# Patient Record
Sex: FEMALE | Race: WHITE | Employment: UNEMPLOYED | URBAN - METROPOLITAN AREA
[De-identification: names, ages, dates, MRNs, and addresses within clinical notes are randomized per-mention and may not be internally consistent; named-entity substitution may affect disease eponyms.]

---

## 2017-01-20 RX ORDER — SIMVASTATIN 20 MG
TABLET ORAL
Qty: 30 TABLET | Refills: 0 | Status: SHIPPED | OUTPATIENT
Start: 2017-01-20 | End: 2017-02-16

## 2017-01-20 RX ORDER — ALPRAZOLAM 0.5 MG/1
TABLET ORAL
Qty: 40 TABLET | Refills: 0 | Status: SHIPPED | OUTPATIENT
Start: 2017-01-20 | End: 2017-04-17

## 2017-02-16 RX ORDER — ALPRAZOLAM 0.5 MG/1
0.5 TABLET ORAL 3 TIMES DAILY PRN
Qty: 40 TABLET | Refills: 0 | OUTPATIENT
Start: 2017-02-16

## 2017-02-16 RX ORDER — SIMVASTATIN 20 MG
TABLET ORAL
Qty: 15 TABLET | Refills: 0 | Status: SHIPPED | OUTPATIENT
Start: 2017-02-16 | End: 2017-03-16

## 2017-02-16 RX ORDER — PAROXETINE HYDROCHLORIDE 40 MG/1
TABLET, FILM COATED ORAL
Qty: 15 TABLET | Refills: 0 | Status: SHIPPED | OUTPATIENT
Start: 2017-02-16 | End: 2017-02-27

## 2017-02-16 RX ORDER — LOSARTAN POTASSIUM AND HYDROCHLOROTHIAZIDE 25; 100 MG/1; MG/1
1 TABLET ORAL
Qty: 15 TABLET | Refills: 0 | Status: SHIPPED | OUTPATIENT
Start: 2017-02-16 | End: 2017-03-16

## 2017-02-17 RX ORDER — PAROXETINE HYDROCHLORIDE 40 MG/1
TABLET, FILM COATED ORAL
Qty: 30 TABLET | Refills: 0 | OUTPATIENT
Start: 2017-02-17

## 2017-02-22 ENCOUNTER — TELEPHONE (OUTPATIENT)
Dept: INTERNAL MEDICINE CLINIC | Facility: CLINIC | Age: 52
End: 2017-02-22

## 2017-02-22 NOTE — TELEPHONE ENCOUNTER
LVM informing pt of missed appt and No Show policy.  Also asked pt to call our office to reschedule appt

## 2017-02-27 RX ORDER — PAROXETINE HYDROCHLORIDE 40 MG/1
40 TABLET, FILM COATED ORAL EVERY MORNING
Qty: 15 TABLET | Refills: 0 | Status: SHIPPED | OUTPATIENT
Start: 2017-02-27 | End: 2017-03-16

## 2017-02-27 NOTE — TELEPHONE ENCOUNTER
05/06/16-last ov, pt needs ov for med f/u, looks like she cancelled last one, I left her a message to call back and schedule

## 2017-03-06 ENCOUNTER — TELEPHONE (OUTPATIENT)
Dept: INTERNAL MEDICINE CLINIC | Facility: CLINIC | Age: 52
End: 2017-03-06

## 2017-03-06 NOTE — TELEPHONE ENCOUNTER
Called patient regarding missed/no show appointment. Left voice message regarding second no show fee (potentially) plus option to reschedule.

## 2017-03-10 RX ORDER — PAROXETINE HYDROCHLORIDE 40 MG/1
40 TABLET, FILM COATED ORAL EVERY MORNING
Qty: 15 TABLET | Refills: 0 | OUTPATIENT
Start: 2017-03-10

## 2017-03-15 ENCOUNTER — TELEPHONE (OUTPATIENT)
Dept: INTERNAL MEDICINE CLINIC | Facility: CLINIC | Age: 52
End: 2017-03-15

## 2017-03-16 ENCOUNTER — OFFICE VISIT (OUTPATIENT)
Dept: RHEUMATOLOGY | Facility: CLINIC | Age: 52
End: 2017-03-16

## 2017-03-16 VITALS
SYSTOLIC BLOOD PRESSURE: 120 MMHG | BODY MASS INDEX: 34.99 KG/M2 | DIASTOLIC BLOOD PRESSURE: 90 MMHG | HEART RATE: 88 BPM | RESPIRATION RATE: 18 BRPM | WEIGHT: 210 LBS | HEIGHT: 65 IN

## 2017-03-16 DIAGNOSIS — M16.0 PRIMARY OSTEOARTHRITIS OF HIPS, BILATERAL: Primary | ICD-10-CM

## 2017-03-16 DIAGNOSIS — M17.32 POST-TRAUMATIC OSTEOARTHRITIS OF LEFT KNEE: ICD-10-CM

## 2017-03-16 PROCEDURE — 99213 OFFICE O/P EST LOW 20 MIN: CPT | Performed by: INTERNAL MEDICINE

## 2017-03-16 RX ORDER — SIMVASTATIN 20 MG
TABLET ORAL
Qty: 30 TABLET | Refills: 0 | Status: SHIPPED | OUTPATIENT
Start: 2017-03-16 | End: 2017-04-17 | Stop reason: ALTCHOICE

## 2017-03-16 RX ORDER — PAROXETINE HYDROCHLORIDE 40 MG/1
40 TABLET, FILM COATED ORAL EVERY MORNING
Qty: 30 TABLET | Refills: 0 | Status: SHIPPED | OUTPATIENT
Start: 2017-03-16 | End: 2017-04-17

## 2017-03-16 RX ORDER — HYDROCODONE BITARTRATE AND ACETAMINOPHEN 10; 325 MG/1; MG/1
1 TABLET ORAL EVERY 4 HOURS PRN
Qty: 150 TABLET | Refills: 0 | Status: SHIPPED | OUTPATIENT
Start: 2017-03-16 | End: 2017-03-16

## 2017-03-16 RX ORDER — MELOXICAM 15 MG/1
15 TABLET ORAL DAILY
Qty: 30 TABLET | Refills: 2 | Status: SHIPPED | OUTPATIENT
Start: 2017-03-16 | End: 2017-04-17

## 2017-03-16 RX ORDER — LOSARTAN POTASSIUM AND HYDROCHLOROTHIAZIDE 25; 100 MG/1; MG/1
1 TABLET ORAL
Qty: 30 TABLET | Refills: 0 | Status: SHIPPED | OUTPATIENT
Start: 2017-03-16 | End: 2017-04-17

## 2017-03-16 RX ORDER — HYDROCODONE BITARTRATE AND ACETAMINOPHEN 10; 325 MG/1; MG/1
1 TABLET ORAL EVERY 4 HOURS PRN
Qty: 150 TABLET | Refills: 0 | Status: SHIPPED | OUTPATIENT
Start: 2017-04-16 | End: 2017-03-16

## 2017-03-16 RX ORDER — HYDROCODONE BITARTRATE AND ACETAMINOPHEN 10; 325 MG/1; MG/1
1 TABLET ORAL EVERY 4 HOURS PRN
Qty: 150 TABLET | Refills: 0 | Status: SHIPPED | OUTPATIENT
Start: 2017-05-16 | End: 2017-09-18

## 2017-03-16 NOTE — PROGRESS NOTES
[facility]  Dr. Son Co Progress Note     Subjective:   Marie Alvarado is a(n) 46year old female. Current complaints: Patient presents with:  Osteoarthritis: 2 month f/u. Pt has not gone to orthopedic dr-does not have insurance.   Continues with hi

## 2017-03-16 NOTE — PATIENT INSTRUCTIONS
Reports that Celebrex is not helping much and she is self-pay now for medicine. Therefore discontinue Celebrex and will try a less expensive anti-inflammatory. Plan is to try meloxicam 15 mg once a day.   For breakthrough pain take Norco 1 tablet every 4-

## 2017-04-17 ENCOUNTER — OFFICE VISIT (OUTPATIENT)
Dept: INTERNAL MEDICINE CLINIC | Facility: CLINIC | Age: 52
End: 2017-04-17

## 2017-04-17 VITALS
HEART RATE: 130 BPM | SYSTOLIC BLOOD PRESSURE: 122 MMHG | TEMPERATURE: 99 F | BODY MASS INDEX: 35 KG/M2 | DIASTOLIC BLOOD PRESSURE: 80 MMHG | RESPIRATION RATE: 16 BRPM | WEIGHT: 207.5 LBS | OXYGEN SATURATION: 97 %

## 2017-04-17 DIAGNOSIS — R19.7 VOMITING AND DIARRHEA: ICD-10-CM

## 2017-04-17 DIAGNOSIS — R39.9 URINARY TRACT INFECTION SYMPTOMS: ICD-10-CM

## 2017-04-17 DIAGNOSIS — R11.10 VOMITING AND DIARRHEA: ICD-10-CM

## 2017-04-17 DIAGNOSIS — I10 ESSENTIAL HYPERTENSION, BENIGN: ICD-10-CM

## 2017-04-17 DIAGNOSIS — E11.9 DIABETES MELLITUS WITHOUT COMPLICATION (HCC): Primary | ICD-10-CM

## 2017-04-17 DIAGNOSIS — F41.1 ANXIETY STATE: ICD-10-CM

## 2017-04-17 DIAGNOSIS — E78.00 PURE HYPERCHOLESTEROLEMIA: ICD-10-CM

## 2017-04-17 PROCEDURE — 81003 URINALYSIS AUTO W/O SCOPE: CPT | Performed by: FAMILY MEDICINE

## 2017-04-17 PROCEDURE — 99213 OFFICE O/P EST LOW 20 MIN: CPT | Performed by: FAMILY MEDICINE

## 2017-04-17 RX ORDER — PAROXETINE HYDROCHLORIDE 40 MG/1
40 TABLET, FILM COATED ORAL EVERY MORNING
Qty: 30 TABLET | Refills: 2 | Status: SHIPPED | OUTPATIENT
Start: 2017-04-17 | End: 2017-07-18

## 2017-04-17 RX ORDER — ALPRAZOLAM 0.5 MG/1
0.5 TABLET ORAL 3 TIMES DAILY PRN
Qty: 40 TABLET | Refills: 2 | Status: SHIPPED | OUTPATIENT
Start: 2017-04-17 | End: 2017-07-17

## 2017-04-17 RX ORDER — ONDANSETRON 4 MG/1
4 TABLET, FILM COATED ORAL EVERY 8 HOURS PRN
Qty: 30 TABLET | Refills: 0 | Status: SHIPPED | OUTPATIENT
Start: 2017-04-17 | End: 2018-01-30

## 2017-04-17 RX ORDER — ATORVASTATIN CALCIUM 20 MG/1
20 TABLET, FILM COATED ORAL NIGHTLY
Qty: 30 TABLET | Refills: 2 | Status: SHIPPED | OUTPATIENT
Start: 2017-04-17 | End: 2017-07-18

## 2017-04-17 RX ORDER — LOSARTAN POTASSIUM AND HYDROCHLOROTHIAZIDE 25; 100 MG/1; MG/1
1 TABLET ORAL
Qty: 30 TABLET | Refills: 2 | Status: SHIPPED | OUTPATIENT
Start: 2017-04-17 | End: 2017-07-18

## 2017-04-17 NOTE — PROGRESS NOTES
HPI:   Geronimo Peñaloza is a 46year old female who presents for recheck of her diabetes. Patient’s FBS have been 's   Last visit with ophthalmologist was 2015. Pt has no insurance to do it. .    Pt has been checking her feet on a regular basis.  Daniel kern bismal and that has helped some. But still having some issues with nausea.         Wt Readings from Last 6 Encounters:  04/17/17 : 207 lb 8 oz  03/16/17 : 210 lb  10/27/16 : 188 lb  09/16/16 : 188 lb  06/27/16 : 205 lb  06/06/16 : 204 lb    Body mass index 30 tablet Rfl: 2   Ondansetron HCl (ZOFRAN) 4 mg tablet Take 1 tablet (4 mg total) by mouth every 8 (eight) hours as needed for Nausea.  Disp: 30 tablet Rfl: 0   [START ON 5/16/2017] HYDROcodone-acetaminophen (NORCO)  MG Oral Tab Take 1 tablet by mout no W/R/R.  CARDIO: RRR without murmur  GI: BS x4, normoactive, no masses or HSM.   Soft without distention ,+ suprapubic pressure  Bilateral barefoot skin diabetic exam is normal, visualized feet and the appearance is normal.  Bilateral monofilament/sensati diet    (F41.1) Anxiety state  Plan: PARoxetine HCl 40 MG Oral Tab, ALPRAZolam 0.5         MG Oral Tab            (I10) Essential hypertension, benign  Plan: Losartan Potassium-HCTZ 100-25 MG Oral Tab        Avoid salt and caffeine    (R11.10,  R19.7) Vomi

## 2017-06-20 ENCOUNTER — OFFICE VISIT (OUTPATIENT)
Dept: RHEUMATOLOGY | Facility: CLINIC | Age: 52
End: 2017-06-20

## 2017-06-20 VITALS
WEIGHT: 205 LBS | SYSTOLIC BLOOD PRESSURE: 110 MMHG | HEART RATE: 100 BPM | RESPIRATION RATE: 16 BRPM | BODY MASS INDEX: 34.16 KG/M2 | DIASTOLIC BLOOD PRESSURE: 78 MMHG | HEIGHT: 65 IN

## 2017-06-20 DIAGNOSIS — M17.32 POST-TRAUMATIC OSTEOARTHRITIS OF LEFT KNEE: Primary | ICD-10-CM

## 2017-06-20 DIAGNOSIS — M16.0 PRIMARY OSTEOARTHRITIS OF HIPS, BILATERAL: ICD-10-CM

## 2017-06-20 PROCEDURE — 99212 OFFICE O/P EST SF 10 MIN: CPT | Performed by: INTERNAL MEDICINE

## 2017-06-20 RX ORDER — HYDROCODONE BITARTRATE AND ACETAMINOPHEN 10; 325 MG/1; MG/1
1 TABLET ORAL
Qty: 150 TABLET | Refills: 0 | Status: SHIPPED | OUTPATIENT
Start: 2017-06-20 | End: 2017-07-20

## 2017-06-20 RX ORDER — HYDROCODONE BITARTRATE AND ACETAMINOPHEN 10; 325 MG/1; MG/1
1 TABLET ORAL
Qty: 150 TABLET | Refills: 0 | Status: SHIPPED | OUTPATIENT
Start: 2017-07-20 | End: 2017-08-19

## 2017-06-20 RX ORDER — ETODOLAC 500 MG/1
500 TABLET, FILM COATED ORAL 2 TIMES DAILY
Qty: 60 TABLET | Refills: 2 | Status: SHIPPED | OUTPATIENT
Start: 2017-06-20 | End: 2017-09-18 | Stop reason: ALTCHOICE

## 2017-06-20 RX ORDER — HYDROCODONE BITARTRATE AND ACETAMINOPHEN 10; 325 MG/1; MG/1
1 TABLET ORAL
Qty: 150 TABLET | Refills: 0 | Status: SHIPPED | OUTPATIENT
Start: 2017-08-20 | End: 2017-09-18

## 2017-06-20 NOTE — PATIENT INSTRUCTIONS
Current plan is to switch to E etodolac 500 mg twice a day for anti-inflammatory arthritis pain relief. Celebrex has been discontinued it did not work. Meloxicam also discontinued it was not helpful.   Over-the-counter medicines like Aleve and ibuprofen a

## 2017-06-20 NOTE — PROGRESS NOTES
[facility]  Dr. Banerjee Exon Progress Note     Subjective:   Gen Guerrero is a(n) 46year old female. Current complaints: Patient presents with:  Osteoarthritis: 3 month f/u. Pt states continues bilateral hips/knee/hand pain.  New symptom-right shoulde

## 2017-07-10 DIAGNOSIS — I10 ESSENTIAL HYPERTENSION, BENIGN: ICD-10-CM

## 2017-07-10 DIAGNOSIS — E78.00 PURE HYPERCHOLESTEROLEMIA: ICD-10-CM

## 2017-07-10 DIAGNOSIS — F41.1 ANXIETY STATE: ICD-10-CM

## 2017-07-10 RX ORDER — PAROXETINE HYDROCHLORIDE 40 MG/1
40 TABLET, FILM COATED ORAL EVERY MORNING
Qty: 30 TABLET | Refills: 0 | OUTPATIENT
Start: 2017-07-10

## 2017-07-10 RX ORDER — LOSARTAN POTASSIUM AND HYDROCHLOROTHIAZIDE 25; 100 MG/1; MG/1
1 TABLET ORAL
Qty: 30 TABLET | Refills: 0 | OUTPATIENT
Start: 2017-07-10

## 2017-07-10 RX ORDER — ATORVASTATIN CALCIUM 20 MG/1
20 TABLET, FILM COATED ORAL NIGHTLY
Qty: 30 TABLET | Refills: 0 | OUTPATIENT
Start: 2017-07-10

## 2017-07-17 DIAGNOSIS — F41.1 ANXIETY STATE: ICD-10-CM

## 2017-07-17 RX ORDER — ALPRAZOLAM 0.5 MG/1
0.5 TABLET ORAL 3 TIMES DAILY PRN
Qty: 40 TABLET | Refills: 0 | Status: SHIPPED | OUTPATIENT
Start: 2017-07-17 | End: 2017-10-19

## 2017-07-17 NOTE — TELEPHONE ENCOUNTER
Faxed Rx script ([facility].5mg) [facility] (DD #[phone]).

## 2017-07-18 DIAGNOSIS — E11.9 DIABETES MELLITUS WITHOUT COMPLICATION (HCC): ICD-10-CM

## 2017-07-18 DIAGNOSIS — I10 ESSENTIAL HYPERTENSION, BENIGN: ICD-10-CM

## 2017-07-18 DIAGNOSIS — E78.00 PURE HYPERCHOLESTEROLEMIA: ICD-10-CM

## 2017-07-18 DIAGNOSIS — F41.1 ANXIETY STATE: ICD-10-CM

## 2017-07-18 RX ORDER — PAROXETINE HYDROCHLORIDE 40 MG/1
40 TABLET, FILM COATED ORAL EVERY MORNING
Qty: 30 TABLET | Refills: 0 | Status: SHIPPED | OUTPATIENT
Start: 2017-07-18 | End: 2017-09-19

## 2017-07-18 RX ORDER — LOSARTAN POTASSIUM AND HYDROCHLOROTHIAZIDE 25; 100 MG/1; MG/1
1 TABLET ORAL
Qty: 30 TABLET | Refills: 0 | Status: SHIPPED | OUTPATIENT
Start: 2017-07-18 | End: 2017-09-19

## 2017-07-18 RX ORDER — ATORVASTATIN CALCIUM 20 MG/1
20 TABLET, FILM COATED ORAL NIGHTLY
Qty: 30 TABLET | Refills: 0 | Status: SHIPPED | OUTPATIENT
Start: 2017-07-18 | End: 2017-10-19

## 2017-07-18 NOTE — TELEPHONE ENCOUNTER
It has been a year since she has had her labs done. I will give one month of her medications and then Pt needs to get her labs done. Pt needs a Hgba1c, Lipid and CMP. Pt can call around to the different labs to see who can do it for less.   [facility] in [place]

## 2017-07-18 NOTE — TELEPHONE ENCOUNTER
Refills on metformin, atorvastatin, losartan-hctz and paroxetine were denied since she is over due for lab work. Last lab draw 6/2016. Pt stated that she is unemployed and can not afford lab work at this time, but needs refills on medications.      me

## 2017-09-18 ENCOUNTER — TELEPHONE (OUTPATIENT)
Dept: INTERNAL MEDICINE CLINIC | Facility: CLINIC | Age: 52
End: 2017-09-18

## 2017-09-18 ENCOUNTER — OFFICE VISIT (OUTPATIENT)
Dept: RHEUMATOLOGY | Facility: CLINIC | Age: 52
End: 2017-09-18

## 2017-09-18 VITALS
BODY MASS INDEX: 36 KG/M2 | SYSTOLIC BLOOD PRESSURE: 142 MMHG | DIASTOLIC BLOOD PRESSURE: 86 MMHG | HEART RATE: 68 BPM | RESPIRATION RATE: 20 BRPM | WEIGHT: 217 LBS

## 2017-09-18 DIAGNOSIS — M17.32 POST-TRAUMATIC OSTEOARTHRITIS OF LEFT KNEE: ICD-10-CM

## 2017-09-18 DIAGNOSIS — I10 ESSENTIAL HYPERTENSION, BENIGN: ICD-10-CM

## 2017-09-18 DIAGNOSIS — M16.0 PRIMARY OSTEOARTHRITIS OF HIPS, BILATERAL: Primary | ICD-10-CM

## 2017-09-18 DIAGNOSIS — F41.1 ANXIETY STATE: ICD-10-CM

## 2017-09-18 PROCEDURE — 99213 OFFICE O/P EST LOW 20 MIN: CPT | Performed by: INTERNAL MEDICINE

## 2017-09-18 RX ORDER — HYDROCODONE BITARTRATE AND ACETAMINOPHEN 10; 325 MG/1; MG/1
1 TABLET ORAL EVERY 4 HOURS PRN
Qty: 150 TABLET | Refills: 0 | Status: SHIPPED | OUTPATIENT
Start: 2017-09-18 | End: 2017-09-18

## 2017-09-18 RX ORDER — HYDROCODONE BITARTRATE AND ACETAMINOPHEN 10; 325 MG/1; MG/1
1 TABLET ORAL EVERY 4 HOURS PRN
Qty: 150 TABLET | Refills: 0 | Status: SHIPPED | OUTPATIENT
Start: 2017-10-18 | End: 2018-03-19

## 2017-09-18 RX ORDER — DICLOFENAC SODIUM 75 MG/1
75 TABLET, DELAYED RELEASE ORAL 2 TIMES DAILY
Qty: 60 TABLET | Refills: 3 | Status: SHIPPED | OUTPATIENT
Start: 2017-09-18 | End: 2018-03-19 | Stop reason: ALTCHOICE

## 2017-09-18 RX ORDER — HYDROCODONE BITARTRATE AND ACETAMINOPHEN 10; 325 MG/1; MG/1
1 TABLET ORAL EVERY 4 HOURS PRN
Qty: 150 TABLET | Refills: 0 | Status: SHIPPED | OUTPATIENT
Start: 2017-11-17 | End: 2018-03-19

## 2017-09-18 RX ORDER — HYDROCODONE BITARTRATE AND ACETAMINOPHEN 10; 325 MG/1; MG/1
1 TABLET ORAL EVERY 4 HOURS PRN
Qty: 150 TABLET | Refills: 0 | Status: SHIPPED | OUTPATIENT
Start: 2017-09-18 | End: 2017-10-18

## 2017-09-18 NOTE — TELEPHONE ENCOUNTER
Patient requesting a refill on her Losartan-Potassium HCTZ 100-25 and Paroxetine HCTZ through [facility] in [place] on [address]. She is aware she is due for a blood test but is umemployed and can not afford. She is all out of medication.

## 2017-09-18 NOTE — PATIENT INSTRUCTIONS
Current plan is to discontinue E etodolac for lack of effectiveness. Also previously Celebrex and meloxicam did not work. These are nonsteroidal anti-inflammatory drugs.   We will try a different nonsteroidal anti-inflammatory drug called diclofenac 75 mg

## 2017-09-18 NOTE — TELEPHONE ENCOUNTER
Ok to refill one month only per Dr. Selvin Peralta. Pt needs an appt next month and she needs her labs done or no more refills. Pt has not had any labs done in over a year.  Pt can go to [facility] in [place] [address] and get all three labs that she needs for about 75$.

## 2017-09-19 RX ORDER — LOSARTAN POTASSIUM AND HYDROCHLOROTHIAZIDE 25; 100 MG/1; MG/1
1 TABLET ORAL
Qty: 30 TABLET | Refills: 0 | Status: SHIPPED | OUTPATIENT
Start: 2017-09-19 | End: 2017-10-19

## 2017-09-19 RX ORDER — PAROXETINE HYDROCHLORIDE 40 MG/1
40 TABLET, FILM COATED ORAL EVERY MORNING
Qty: 30 TABLET | Refills: 0 | Status: SHIPPED | OUTPATIENT
Start: 2017-09-19 | End: 2017-10-19

## 2017-09-19 NOTE — TELEPHONE ENCOUNTER
Patient called in requesting refill for the medications once again ( please see first message). Patient stated she is unable to schedule appointment due to no insurance/insurance complications.

## 2017-09-19 NOTE — TELEPHONE ENCOUNTER
Pt informed states aware of lab has the information but still can not afford ,advised pt only 30 day sent till appt/labs

## 2017-10-18 ENCOUNTER — TELEPHONE (OUTPATIENT)
Dept: INTERNAL MEDICINE CLINIC | Facility: CLINIC | Age: 52
End: 2017-10-18

## 2017-10-18 DIAGNOSIS — E11.9 DIABETES MELLITUS WITHOUT COMPLICATION (HCC): ICD-10-CM

## 2017-10-18 DIAGNOSIS — F41.1 ANXIETY STATE: ICD-10-CM

## 2017-10-18 DIAGNOSIS — E78.00 PURE HYPERCHOLESTEROLEMIA: ICD-10-CM

## 2017-10-18 DIAGNOSIS — I10 ESSENTIAL HYPERTENSION, BENIGN: ICD-10-CM

## 2017-10-18 NOTE — TELEPHONE ENCOUNTER
Patient calling to request refills for ALPRAZolam 0.5 MG,atorvastatin (LIPITOR) 20 Mg, MetFORMIN HCl 1000 Mg, PARoxetine HCl 40 Mg, and  Losartan Potassium-HCTZ 100-25 Mg.  Please call patient back

## 2017-10-19 RX ORDER — ALPRAZOLAM 0.5 MG/1
0.5 TABLET ORAL 3 TIMES DAILY PRN
Qty: 40 TABLET | Refills: 0 | Status: SHIPPED | OUTPATIENT
Start: 2017-10-19 | End: 2018-01-18

## 2017-10-19 RX ORDER — LOSARTAN POTASSIUM AND HYDROCHLOROTHIAZIDE 25; 100 MG/1; MG/1
1 TABLET ORAL
Qty: 90 TABLET | Refills: 0 | Status: SHIPPED | OUTPATIENT
Start: 2017-10-19 | End: 2018-01-18

## 2017-10-19 RX ORDER — GLIMEPIRIDE 2 MG/1
2 TABLET ORAL
Qty: 90 TABLET | Refills: 0 | Status: SHIPPED | OUTPATIENT
Start: 2017-10-19 | End: 2018-07-06

## 2017-10-19 RX ORDER — ATORVASTATIN CALCIUM 20 MG/1
20 TABLET, FILM COATED ORAL NIGHTLY
Qty: 90 TABLET | Refills: 0 | Status: SHIPPED | OUTPATIENT
Start: 2017-10-19 | End: 2018-01-18

## 2017-10-19 RX ORDER — PAROXETINE HYDROCHLORIDE 40 MG/1
40 TABLET, FILM COATED ORAL EVERY MORNING
Qty: 90 TABLET | Refills: 0 | Status: SHIPPED | OUTPATIENT
Start: 2017-10-19 | End: 2018-01-18

## 2017-10-19 NOTE — TELEPHONE ENCOUNTER
Labs reviewed. Chol,trigs,LDL bad,not under control. Has Pt been taking her Lipitor? If yes she needs to increase it to 40 mg daily #30 with 2 refills. Hgba1c worse than before 7.6. Pt to continue Metformin but add Amaryl 2 mg one daily #30 with 2 refills.

## 2017-10-19 NOTE — TELEPHONE ENCOUNTER
Pt stated that she is unemployed and is having a difficult time affording an office visit, although she did schedule f/u visit for 10/31/17. She also stated that she had labs drawn 10/16/17 through [facility] requested labs to be faxed to our office.  Selene

## 2017-10-19 NOTE — TELEPHONE ENCOUNTER
Patient informed of lab result and orders. Patient has not been taking Lipitor for months or metformin. Would you give a refill on all medications for 90 days?  Patient will repeat the labs and will schedule office visit in 3 months, please advise

## 2017-10-31 ENCOUNTER — TELEPHONE (OUTPATIENT)
Dept: INTERNAL MEDICINE CLINIC | Facility: CLINIC | Age: 52
End: 2017-10-31

## 2017-10-31 NOTE — TELEPHONE ENCOUNTER
Called patient regarding today's missed appointment. Left message explaining our [facility] cancellation policy and stated that this will be considered a NO SHOW.  Being that this is the second no show within the year, patient was notified that there will be a

## 2017-11-01 NOTE — TELEPHONE ENCOUNTER
Per Oz, since pt is not working at this time, she will waive the $50 No Show fee charge. However, if this was to occur again, the third no show patient will be charged the fee.    Called patient to relay message, no answer, left voice message and for the

## 2017-12-19 ENCOUNTER — OFFICE VISIT (OUTPATIENT)
Dept: RHEUMATOLOGY | Facility: CLINIC | Age: 52
End: 2017-12-19

## 2017-12-19 VITALS — HEART RATE: 78 BPM | DIASTOLIC BLOOD PRESSURE: 78 MMHG | RESPIRATION RATE: 18 BRPM | SYSTOLIC BLOOD PRESSURE: 122 MMHG

## 2017-12-19 DIAGNOSIS — E66.9 CLASS 2 OBESITY WITHOUT SERIOUS COMORBIDITY IN ADULT, UNSPECIFIED BMI, UNSPECIFIED OBESITY TYPE: ICD-10-CM

## 2017-12-19 DIAGNOSIS — M16.0 PRIMARY OSTEOARTHRITIS OF HIPS, BILATERAL: Primary | ICD-10-CM

## 2017-12-19 DIAGNOSIS — M17.32 POST-TRAUMATIC OSTEOARTHRITIS OF LEFT KNEE: ICD-10-CM

## 2017-12-19 PROCEDURE — 99213 OFFICE O/P EST LOW 20 MIN: CPT | Performed by: INTERNAL MEDICINE

## 2017-12-19 RX ORDER — HYDROCODONE BITARTRATE AND ACETAMINOPHEN 10; 325 MG/1; MG/1
1 TABLET ORAL EVERY 4 HOURS PRN
Qty: 150 TABLET | Refills: 0 | Status: SHIPPED | OUTPATIENT
Start: 2017-12-19 | End: 2018-01-18

## 2017-12-19 RX ORDER — HYDROCODONE BITARTRATE AND ACETAMINOPHEN 10; 325 MG/1; MG/1
1 TABLET ORAL EVERY 4 HOURS PRN
Qty: 150 TABLET | Refills: 0 | Status: SHIPPED | OUTPATIENT
Start: 2018-01-18 | End: 2018-02-17

## 2017-12-19 RX ORDER — HYDROCODONE BITARTRATE AND ACETAMINOPHEN 10; 325 MG/1; MG/1
1 TABLET ORAL EVERY 4 HOURS PRN
Qty: 150 TABLET | Refills: 0 | Status: SHIPPED | OUTPATIENT
Start: 2018-02-17 | End: 2018-03-19

## 2017-12-19 NOTE — PROGRESS NOTES
[facility]  Dr. Radha Rizvi Progress Note     Subjective:   Veronica Kingston is a(n) 45year old female. Current complaints: Patient presents with:  Fibromyalgia Syndrome: 3 month f/u. No major concerns since last visit. Currently has the flu.   Arthriti

## 2017-12-19 NOTE — PATIENT INSTRUCTIONS
Continue for arthritis treatment diclofenac 75 mg twice a day. For arthriits pain also use NOrco 10 mg one every 4-6 hours, up to 5 per day. Try to use less if possible. Exercise as best you can. Low fat diet. Return to see doctor in 3 months.

## 2018-01-04 ENCOUNTER — TELEPHONE (OUTPATIENT)
Dept: INTERNAL MEDICINE CLINIC | Facility: CLINIC | Age: 53
End: 2018-01-04

## 2018-01-10 ENCOUNTER — TELEPHONE (OUTPATIENT)
Dept: INTERNAL MEDICINE CLINIC | Facility: CLINIC | Age: 53
End: 2018-01-10

## 2018-01-18 ENCOUNTER — OFFICE VISIT (OUTPATIENT)
Dept: INTERNAL MEDICINE CLINIC | Facility: CLINIC | Age: 53
End: 2018-01-18

## 2018-01-18 ENCOUNTER — TELEPHONE (OUTPATIENT)
Dept: INTERNAL MEDICINE CLINIC | Facility: CLINIC | Age: 53
End: 2018-01-18

## 2018-01-18 VITALS
BODY MASS INDEX: 35 KG/M2 | TEMPERATURE: 98 F | OXYGEN SATURATION: 99 % | DIASTOLIC BLOOD PRESSURE: 88 MMHG | SYSTOLIC BLOOD PRESSURE: 130 MMHG | RESPIRATION RATE: 16 BRPM | HEART RATE: 101 BPM | WEIGHT: 208 LBS

## 2018-01-18 DIAGNOSIS — F41.1 ANXIETY STATE: ICD-10-CM

## 2018-01-18 DIAGNOSIS — I10 ESSENTIAL HYPERTENSION, BENIGN: ICD-10-CM

## 2018-01-18 DIAGNOSIS — E78.00 PURE HYPERCHOLESTEROLEMIA: ICD-10-CM

## 2018-01-18 DIAGNOSIS — E11.9 DIABETES MELLITUS WITHOUT COMPLICATION (HCC): Primary | ICD-10-CM

## 2018-01-18 PROCEDURE — 99213 OFFICE O/P EST LOW 20 MIN: CPT | Performed by: FAMILY MEDICINE

## 2018-01-18 RX ORDER — ALPRAZOLAM 0.5 MG/1
0.5 TABLET ORAL 3 TIMES DAILY PRN
Qty: 40 TABLET | Refills: 0 | Status: SHIPPED | OUTPATIENT
Start: 2018-01-18 | End: 2018-02-15

## 2018-01-18 RX ORDER — LOSARTAN POTASSIUM AND HYDROCHLOROTHIAZIDE 25; 100 MG/1; MG/1
1 TABLET ORAL
Qty: 90 TABLET | Refills: 0 | Status: SHIPPED | OUTPATIENT
Start: 2018-01-18 | End: 2018-04-04

## 2018-01-18 RX ORDER — ATORVASTATIN CALCIUM 20 MG/1
20 TABLET, FILM COATED ORAL NIGHTLY
Qty: 90 TABLET | Refills: 0 | Status: SHIPPED | OUTPATIENT
Start: 2018-01-18 | End: 2018-04-04

## 2018-01-18 RX ORDER — PAROXETINE HYDROCHLORIDE 40 MG/1
40 TABLET, FILM COATED ORAL EVERY MORNING
Qty: 90 TABLET | Refills: 0 | Status: SHIPPED | OUTPATIENT
Start: 2018-01-18 | End: 2018-07-06

## 2018-01-18 RX ORDER — GLIMEPIRIDE 4 MG/1
4 TABLET ORAL
Qty: 90 TABLET | Refills: 0 | Status: SHIPPED | OUTPATIENT
Start: 2018-01-18 | End: 2018-04-04

## 2018-01-18 NOTE — PROGRESS NOTES
HPI:   Carl Mcardle is a 45year old female who presents for recheck of her diabetes. Patient’s FBS have been in the 120-150's. Pt checks her sugar on and off with her husbands meter because she can not afford her own. Pt has no insurance.   Last visit Value   03/05/2013 45 (L)   12/28/2011 48   05/26/2011 37 (L)   ----------  HDL CHOL (mg/dL)   Date Value   09/19/2013 43 (L)   ----------  LDL-CHOLESTEROL (mg/dL (calc))   Date Value   03/05/2013 61   12/28/2011 80   05/26/2011 61   ----------  LDL CHOLES than 5 per day Disp: 150 tablet Rfl: 0   Diclofenac Sodium 75 MG Oral Tab EC Take 1 tablet (75 mg total) by mouth 2 (two) times daily.  Disp: 60 tablet Rfl: 3   HYDROcodone-acetaminophen (NORCO)  MG Oral Tab Take 1 tablet by mouth every 4 (four) hours suspicious lesions  HEENT: NC/AT, PERRLA, EOMs intact b/l. Ears clear b/l, throat without erythema or exudate. NECK: supple,no adenopathy,no bruits. No thyromegaly.    LUNGS: clear to auscultation, no W/R/R.  CARDIO: RRR without murmur  GI: BS x4, normoa times daily as needed. Dispense: 40 tablet; Refill: 0  - PARoxetine HCl 40 MG Oral Tab; Take 1 tablet (40 mg total) by mouth every morning. Dispense: 90 tablet; Refill: 0    2.  Pure hypercholesterolemia  Pt to continue her medication, increase exercise,

## 2018-01-30 DIAGNOSIS — R19.7 VOMITING AND DIARRHEA: ICD-10-CM

## 2018-01-30 DIAGNOSIS — R11.10 VOMITING AND DIARRHEA: ICD-10-CM

## 2018-01-30 RX ORDER — ONDANSETRON 4 MG/1
TABLET, FILM COATED ORAL
Qty: 30 TABLET | Refills: 1 | Status: SHIPPED | OUTPATIENT
Start: 2018-01-30 | End: 2019-12-11 | Stop reason: ALTCHOICE

## 2018-02-08 DIAGNOSIS — F41.1 ANXIETY STATE: ICD-10-CM

## 2018-02-09 RX ORDER — ALPRAZOLAM 0.5 MG/1
TABLET ORAL
Qty: 40 TABLET | Refills: 0 | OUTPATIENT
Start: 2018-02-09

## 2018-02-15 DIAGNOSIS — F41.1 ANXIETY STATE: ICD-10-CM

## 2018-02-15 RX ORDER — ALPRAZOLAM 0.5 MG/1
TABLET ORAL
Qty: 40 TABLET | Refills: 0 | Status: SHIPPED | OUTPATIENT
Start: 2018-02-15 | End: 2018-05-02

## 2018-03-19 ENCOUNTER — OFFICE VISIT (OUTPATIENT)
Dept: RHEUMATOLOGY | Facility: CLINIC | Age: 53
End: 2018-03-19

## 2018-03-19 VITALS
DIASTOLIC BLOOD PRESSURE: 86 MMHG | WEIGHT: 208 LBS | HEART RATE: 80 BPM | RESPIRATION RATE: 20 BRPM | SYSTOLIC BLOOD PRESSURE: 132 MMHG | BODY MASS INDEX: 35 KG/M2

## 2018-03-19 DIAGNOSIS — M17.32 POST-TRAUMATIC OSTEOARTHRITIS OF LEFT KNEE: ICD-10-CM

## 2018-03-19 DIAGNOSIS — M16.0 PRIMARY OSTEOARTHRITIS OF HIPS, BILATERAL: Primary | ICD-10-CM

## 2018-03-19 PROCEDURE — 99212 OFFICE O/P EST SF 10 MIN: CPT | Performed by: INTERNAL MEDICINE

## 2018-03-19 RX ORDER — INDOMETHACIN 75 MG/1
75 CAPSULE, EXTENDED RELEASE ORAL 2 TIMES DAILY
Qty: 60 CAPSULE | Refills: 5 | Status: SHIPPED | OUTPATIENT
Start: 2018-03-19 | End: 2018-06-18 | Stop reason: ALTCHOICE

## 2018-03-19 RX ORDER — HYDROCODONE BITARTRATE AND ACETAMINOPHEN 10; 325 MG/1; MG/1
1 TABLET ORAL EVERY 4 HOURS PRN
Qty: 150 TABLET | Refills: 0 | Status: SHIPPED | OUTPATIENT
Start: 2018-05-19 | End: 2018-12-14

## 2018-03-19 RX ORDER — HYDROCODONE BITARTRATE AND ACETAMINOPHEN 10; 325 MG/1; MG/1
1 TABLET ORAL EVERY 4 HOURS PRN
Qty: 150 TABLET | Refills: 0 | Status: SHIPPED | OUTPATIENT
Start: 2018-04-19 | End: 2018-05-19

## 2018-03-19 RX ORDER — DICLOFENAC SODIUM 75 MG/1
75 TABLET, DELAYED RELEASE ORAL 2 TIMES DAILY
Qty: 60 TABLET | Refills: 3 | Status: CANCELLED | OUTPATIENT
Start: 2018-03-19

## 2018-03-19 RX ORDER — HYDROCODONE BITARTRATE AND ACETAMINOPHEN 10; 325 MG/1; MG/1
1 TABLET ORAL EVERY 4 HOURS PRN
Qty: 150 TABLET | Refills: 0 | Status: SHIPPED | OUTPATIENT
Start: 2018-03-19 | End: 2018-12-14

## 2018-03-19 NOTE — PROGRESS NOTES
[facility]  Dr. Floyd Velasquez Progress Note     Subjective:   Carl Mcardle is a(n) 45year old female. Current complaints: Patient presents with:  Osteoarthritis: 3 month f/u. Pt states 'is having more pain than usual- all over body pain.  Is having lot

## 2018-03-19 NOTE — PATIENT INSTRUCTIONS
Current plan at this time is to discontinue diclofenac due to lack of effectiveness. In its place try indomethacin sustained release 75 mg twice a day which is a generic anti-inflammatory. Take one with breakfast one with supper.   If it does not help you

## 2018-04-04 DIAGNOSIS — E78.00 PURE HYPERCHOLESTEROLEMIA: ICD-10-CM

## 2018-04-04 DIAGNOSIS — E11.9 DIABETES MELLITUS WITHOUT COMPLICATION (HCC): ICD-10-CM

## 2018-04-04 DIAGNOSIS — I10 ESSENTIAL HYPERTENSION, BENIGN: ICD-10-CM

## 2018-04-04 RX ORDER — LOSARTAN POTASSIUM AND HYDROCHLOROTHIAZIDE 25; 100 MG/1; MG/1
TABLET ORAL
Qty: 90 TABLET | Refills: 0 | Status: SHIPPED | OUTPATIENT
Start: 2018-04-04 | End: 2018-07-06

## 2018-04-04 RX ORDER — ATORVASTATIN CALCIUM 20 MG/1
TABLET, FILM COATED ORAL
Qty: 90 TABLET | Refills: 0 | Status: SHIPPED | OUTPATIENT
Start: 2018-04-04 | End: 2018-07-06

## 2018-04-04 RX ORDER — GLIMEPIRIDE 4 MG/1
TABLET ORAL
Qty: 90 TABLET | Refills: 0 | Status: SHIPPED | OUTPATIENT
Start: 2018-04-04 | End: 2018-07-06

## 2018-04-04 NOTE — TELEPHONE ENCOUNTER
Refill requested: Glimepiride + Metformin + Losartan + Atorvastatin     Failed protocol - Due to Lipid Panel (it is scanned in media 1/17/18)      Last refill: 1/18/18 Atorvastatin 20 #90 NR + Metformin 1000 #180 NR + Losartan 100-25 #90 NR + Glimepiride 4 mg #90 NR     Relevant Labs: 1/17/18 (scanned in Media)    Last OV / RTC advised: 1/18/18 RTC in 6 months  Appt Scheduled: No  Your appointments     Date & Time Appointment Department [facility])    Jun 18, 2018 11:00 AM CDT Exam - Established Patient with MD [facility] (Extension Kisha Pulido)        [address] [place] [address],[address], [place] [address] [phone]

## 2018-05-02 DIAGNOSIS — F41.1 ANXIETY STATE: ICD-10-CM

## 2018-05-03 RX ORDER — ALPRAZOLAM 0.5 MG/1
TABLET ORAL
Qty: 40 TABLET | Refills: 0 | Status: SHIPPED | OUTPATIENT
Start: 2018-05-03 | End: 2018-05-31

## 2018-05-08 DIAGNOSIS — E11.9 DIABETES MELLITUS WITHOUT COMPLICATION (HCC): ICD-10-CM

## 2018-05-08 DIAGNOSIS — E78.00 PURE HYPERCHOLESTEROLEMIA: ICD-10-CM

## 2018-05-08 DIAGNOSIS — I10 ESSENTIAL HYPERTENSION, BENIGN: ICD-10-CM

## 2018-05-08 DIAGNOSIS — F41.1 ANXIETY STATE: ICD-10-CM

## 2018-05-09 RX ORDER — PAROXETINE HYDROCHLORIDE 40 MG/1
TABLET, FILM COATED ORAL
Qty: 30 TABLET | Refills: 1 | Status: SHIPPED | OUTPATIENT
Start: 2018-05-09 | End: 2018-07-06

## 2018-05-09 RX ORDER — ATORVASTATIN CALCIUM 20 MG/1
TABLET, FILM COATED ORAL
Qty: 30 TABLET | Refills: 1 | OUTPATIENT
Start: 2018-05-09

## 2018-05-09 RX ORDER — GLIMEPIRIDE 4 MG/1
TABLET ORAL
Qty: 90 TABLET | Refills: 0 | OUTPATIENT
Start: 2018-05-09

## 2018-05-09 RX ORDER — LOSARTAN POTASSIUM AND HYDROCHLOROTHIAZIDE 25; 100 MG/1; MG/1
TABLET ORAL
Qty: 90 TABLET | Refills: 0 | OUTPATIENT
Start: 2018-05-09

## 2018-05-31 DIAGNOSIS — F41.1 ANXIETY STATE: ICD-10-CM

## 2018-06-01 NOTE — TELEPHONE ENCOUNTER
ALPRAZOLAM 0.5 MG Oral Tab 40 tablet 0 5/3/2018    Sig :  TAKE 1 TABLET BY MOUTH THREE TIMES A DAY AS NEEDED

## 2018-06-02 RX ORDER — ALPRAZOLAM 0.5 MG/1
TABLET ORAL
Qty: 40 TABLET | Refills: 0 | Status: SHIPPED | OUTPATIENT
Start: 2018-06-02 | End: 2018-07-06

## 2018-06-18 ENCOUNTER — OFFICE VISIT (OUTPATIENT)
Dept: RHEUMATOLOGY | Facility: CLINIC | Age: 53
End: 2018-06-18

## 2018-06-18 VITALS
SYSTOLIC BLOOD PRESSURE: 122 MMHG | HEART RATE: 68 BPM | WEIGHT: 207 LBS | RESPIRATION RATE: 20 BRPM | BODY MASS INDEX: 34 KG/M2 | DIASTOLIC BLOOD PRESSURE: 84 MMHG

## 2018-06-18 DIAGNOSIS — M16.0 PRIMARY OSTEOARTHRITIS OF HIPS, BILATERAL: Primary | ICD-10-CM

## 2018-06-18 DIAGNOSIS — M17.0 PRIMARY OSTEOARTHRITIS OF BOTH KNEES: ICD-10-CM

## 2018-06-18 PROCEDURE — 99213 OFFICE O/P EST LOW 20 MIN: CPT | Performed by: INTERNAL MEDICINE

## 2018-06-18 RX ORDER — HYDROCODONE BITARTRATE AND ACETAMINOPHEN 10; 325 MG/1; MG/1
1 TABLET ORAL EVERY 4 HOURS PRN
Qty: 150 TABLET | Refills: 0 | Status: SHIPPED | OUTPATIENT
Start: 2018-06-18 | End: 2018-07-18

## 2018-06-18 RX ORDER — HYDROCODONE BITARTRATE AND ACETAMINOPHEN 10; 325 MG/1; MG/1
1 TABLET ORAL EVERY 4 HOURS PRN
Qty: 150 TABLET | Refills: 0 | Status: SHIPPED | OUTPATIENT
Start: 2018-08-17 | End: 2018-09-16

## 2018-06-18 RX ORDER — HYDROCODONE BITARTRATE AND ACETAMINOPHEN 10; 325 MG/1; MG/1
1 TABLET ORAL EVERY 4 HOURS PRN
Qty: 150 TABLET | Refills: 0 | Status: SHIPPED | OUTPATIENT
Start: 2018-07-18 | End: 2018-08-17

## 2018-06-18 RX ORDER — CELECOXIB 200 MG/1
200 CAPSULE ORAL 2 TIMES DAILY PRN
Qty: 60 CAPSULE | Refills: 2 | Status: SHIPPED | OUTPATIENT
Start: 2018-06-18 | End: 2018-07-16

## 2018-06-18 NOTE — PATIENT INSTRUCTIONS
Use Celebrex 200 mg once or twice a day for the arthritis pain. Use Norco 10 mg one every 4-6 hours up to 5 per day. Use a heating pad as needed. Use BenGay and other salves as needed. Eventually see Dr Estevez Headings of Orthopedics.   Return to office 3 mon

## 2018-06-18 NOTE — PROGRESS NOTES
[facility]  Dr. Daniel Hogue Progress Note     Subjective:   Atilio Wade is a(n) 45year old female. Current complaints: Patient presents with:  Osteoarthritis: 3 month f/u. Pt c/o bilateral hip/knee pain. Pt states 'pain has just been getting worse.

## 2018-06-28 DIAGNOSIS — F41.1 ANXIETY STATE: ICD-10-CM

## 2018-06-29 RX ORDER — ALPRAZOLAM 0.5 MG/1
TABLET ORAL
Qty: 40 TABLET | Refills: 0 | OUTPATIENT
Start: 2018-06-29

## 2018-07-02 DIAGNOSIS — F41.1 ANXIETY STATE: ICD-10-CM

## 2018-07-03 DIAGNOSIS — E78.00 PURE HYPERCHOLESTEROLEMIA: ICD-10-CM

## 2018-07-03 DIAGNOSIS — E11.9 DIABETES MELLITUS WITHOUT COMPLICATION (HCC): ICD-10-CM

## 2018-07-03 DIAGNOSIS — I10 ESSENTIAL HYPERTENSION, BENIGN: ICD-10-CM

## 2018-07-03 DIAGNOSIS — F41.1 ANXIETY STATE: ICD-10-CM

## 2018-07-05 RX ORDER — LOSARTAN POTASSIUM AND HYDROCHLOROTHIAZIDE 25; 100 MG/1; MG/1
1 TABLET ORAL DAILY
Qty: 90 TABLET | Refills: 1 | OUTPATIENT
Start: 2018-07-05

## 2018-07-05 RX ORDER — PAROXETINE HYDROCHLORIDE 40 MG/1
40 TABLET, FILM COATED ORAL EVERY MORNING
Qty: 90 TABLET | Refills: 1 | OUTPATIENT
Start: 2018-07-05

## 2018-07-05 RX ORDER — GLIMEPIRIDE 4 MG/1
4 TABLET ORAL
Qty: 90 TABLET | Refills: 1 | OUTPATIENT
Start: 2018-07-05

## 2018-07-05 RX ORDER — ATORVASTATIN CALCIUM 20 MG/1
20 TABLET, FILM COATED ORAL NIGHTLY
Qty: 90 TABLET | Refills: 1 | OUTPATIENT
Start: 2018-07-05

## 2018-07-05 RX ORDER — ALPRAZOLAM 0.5 MG/1
TABLET ORAL
Qty: 40 TABLET | Refills: 0 | OUTPATIENT
Start: 2018-07-05

## 2018-07-05 NOTE — TELEPHONE ENCOUNTER
Refill requested: Losartan Potassium HCTZ 100-25 mg + Atorvastatin 20 mg + Glimepiride 4 mg + Metformin 1000 mg + Paroxetine 40 mg     Failed protocol      Last refill: 4/4/18 Losartan HCTZ 100-25 mg #90 NR + Glimepiride 4 mg #90 NR + Metformin 1000 mg #18

## 2018-07-05 NOTE — TELEPHONE ENCOUNTER
** Spoke to pt and scheduled appt for 7/16**    Refill requested: Alprazolam .5 mg    Failed protocol      Last refill: 6/2/18 #40 NR    Relevant Labs: NA  Last OV / RTC advised: 1/18/18 Refugkeegan Denney RTC in 6 months , sooner prn    Appt Scheduled: NO  Your

## 2018-07-06 RX ORDER — LOSARTAN POTASSIUM AND HYDROCHLOROTHIAZIDE 25; 100 MG/1; MG/1
TABLET ORAL
Qty: 10 TABLET | Refills: 0 | Status: SHIPPED | OUTPATIENT
Start: 2018-07-06 | End: 2018-07-16

## 2018-07-06 RX ORDER — ATORVASTATIN CALCIUM 20 MG/1
TABLET, FILM COATED ORAL
Qty: 10 TABLET | Refills: 0 | Status: SHIPPED | OUTPATIENT
Start: 2018-07-06 | End: 2018-07-16

## 2018-07-06 RX ORDER — PAROXETINE HYDROCHLORIDE 40 MG/1
40 TABLET, FILM COATED ORAL EVERY MORNING
Qty: 10 TABLET | Refills: 0 | Status: SHIPPED | OUTPATIENT
Start: 2018-07-06 | End: 2018-07-16

## 2018-07-06 RX ORDER — GLIMEPIRIDE 4 MG/1
TABLET ORAL
Qty: 10 TABLET | Refills: 0 | Status: SHIPPED | OUTPATIENT
Start: 2018-07-06 | End: 2018-07-16

## 2018-07-07 DIAGNOSIS — I10 ESSENTIAL HYPERTENSION, BENIGN: ICD-10-CM

## 2018-07-07 DIAGNOSIS — E11.9 DIABETES MELLITUS WITHOUT COMPLICATION (HCC): ICD-10-CM

## 2018-07-07 DIAGNOSIS — E78.00 PURE HYPERCHOLESTEROLEMIA: ICD-10-CM

## 2018-07-07 RX ORDER — LOSARTAN POTASSIUM AND HYDROCHLOROTHIAZIDE 25; 100 MG/1; MG/1
TABLET ORAL
Qty: 90 TABLET | Refills: 0 | OUTPATIENT
Start: 2018-07-07

## 2018-07-07 RX ORDER — ATORVASTATIN CALCIUM 20 MG/1
TABLET, FILM COATED ORAL
Qty: 90 TABLET | Refills: 0 | OUTPATIENT
Start: 2018-07-07

## 2018-07-07 RX ORDER — ALPRAZOLAM 0.5 MG/1
TABLET ORAL
Qty: 40 TABLET | Refills: 0 | Status: SHIPPED | OUTPATIENT
Start: 2018-07-07 | End: 2018-08-03

## 2018-07-07 RX ORDER — GLIMEPIRIDE 4 MG/1
TABLET ORAL
Qty: 90 TABLET | Refills: 0 | OUTPATIENT
Start: 2018-07-07

## 2018-07-16 ENCOUNTER — OFFICE VISIT (OUTPATIENT)
Dept: INTERNAL MEDICINE CLINIC | Facility: CLINIC | Age: 53
End: 2018-07-16

## 2018-07-16 VITALS
RESPIRATION RATE: 20 BRPM | SYSTOLIC BLOOD PRESSURE: 116 MMHG | DIASTOLIC BLOOD PRESSURE: 74 MMHG | WEIGHT: 207.25 LBS | OXYGEN SATURATION: 98 % | BODY MASS INDEX: 34.95 KG/M2 | TEMPERATURE: 99 F | HEART RATE: 117 BPM | HEIGHT: 64.5 IN

## 2018-07-16 DIAGNOSIS — F41.1 ANXIETY STATE: ICD-10-CM

## 2018-07-16 DIAGNOSIS — E11.9 DIABETES MELLITUS WITHOUT COMPLICATION (HCC): Primary | ICD-10-CM

## 2018-07-16 DIAGNOSIS — E78.00 PURE HYPERCHOLESTEROLEMIA: ICD-10-CM

## 2018-07-16 DIAGNOSIS — I10 ESSENTIAL HYPERTENSION, BENIGN: ICD-10-CM

## 2018-07-16 PROCEDURE — 99213 OFFICE O/P EST LOW 20 MIN: CPT | Performed by: FAMILY MEDICINE

## 2018-07-16 RX ORDER — ATORVASTATIN CALCIUM 20 MG/1
TABLET, FILM COATED ORAL
Qty: 30 TABLET | Refills: 0 | Status: SHIPPED | OUTPATIENT
Start: 2018-07-16 | End: 2018-08-10

## 2018-07-16 RX ORDER — PAROXETINE HYDROCHLORIDE 40 MG/1
40 TABLET, FILM COATED ORAL EVERY MORNING
Qty: 30 TABLET | Refills: 0 | Status: SHIPPED | OUTPATIENT
Start: 2018-07-16 | End: 2019-02-13

## 2018-07-16 RX ORDER — GLIMEPIRIDE 4 MG/1
TABLET ORAL
Qty: 30 TABLET | Refills: 0 | Status: SHIPPED | OUTPATIENT
Start: 2018-07-16 | End: 2018-08-10

## 2018-07-16 RX ORDER — LOSARTAN POTASSIUM AND HYDROCHLOROTHIAZIDE 25; 100 MG/1; MG/1
TABLET ORAL
Qty: 30 TABLET | Refills: 0 | Status: SHIPPED | OUTPATIENT
Start: 2018-07-16 | End: 2018-08-10

## 2018-07-16 NOTE — PROGRESS NOTES
HPI:   Licha Tomas is a 45year old female who presents for recheck of her diabetes. Patient’s FBS have been 's  Last visit with ophthalmologist was 3 years ago. .    Pt has been checking her feet on a regular basis.  Pt denies any tingling of th 03/05/2013 45 (L)   12/28/2011 48   05/26/2011 37 (L)   ----------  HDL CHOL (mg/dL)   Date Value   09/19/2013 43 (L)   ----------  LDL-CHOLESTEROL (mg/dL (calc))   Date Value   03/05/2013 61   12/28/2011 80   05/26/2011 61   ----------  LDL CHOLESTROL ( for Pain. For arthritis pain. Disp: 60 capsule Rfl: 2   HYDROcodone-acetaminophen (NORCO)  MG Oral Tab Take 1 tablet by mouth every 4 (four) hours as needed for Pain (up to 5 per day).  Disp: 150 tablet Rfl: 0   HYDROcodone-acetaminophen  MG Ora (Oral)   Resp 20   Ht 64.5\"   Wt 207 lb 4 oz   SpO2 98%   Breastfeeding? No   BMI 35.02 kg/m²   GENERAL: well developed, well nourished,in no apparent distress, pleasant, over weight.   SKIN: no rashes,no suspicious lesions  HEENT: NC/AT, PERRLA, EOMs [place] Consults:  None    1.  Pure hypercholesterolemia  Pt to continue her medication, increase exercise,  choose better fats,  increase fiber and avoid processed foods.    - LIPID PANEL  - COMP METABOLIC PANEL (14)  - atorvastatin 20 MG Oral Tab; TAKE 1 TABLET B

## 2018-08-03 DIAGNOSIS — F41.1 ANXIETY STATE: ICD-10-CM

## 2018-08-04 RX ORDER — ALPRAZOLAM 0.5 MG/1
TABLET ORAL
Qty: 40 TABLET | Refills: 0 | Status: SHIPPED | OUTPATIENT
Start: 2018-08-04 | End: 2018-08-29

## 2018-08-09 LAB
AMB EXT CHOL/HDL RATIO: 3
AMB EXT CHOL/HDL RATIO: 3
AMB EXT CHOLESTEROL, TOTAL: 126 MG/DL
AMB EXT CHOLESTEROL, TOTAL: 126 MG/DL
AMB EXT HDL CHOLESTEROL: 40 MG/DL
AMB EXT HDL CHOLESTEROL: 50 MG/DL
AMB EXT HGBA1C: 6.1 %
AMB EXT LDL CHOLESTEROL, DIRECT: 56 MG/DL
AMB EXT LDL CHOLESTEROL, DIRECT: 56 MG/DL
AMB EXT MALB URINE CALC: 1 MG/24HR
AMB EXT MALB/CRE CALC: 5.2 UG/MG
AMB EXT TRIGLYCERIDES: 150 MG/DL
AMB EXT TRIGLYCERIDES: 150 MG/DL
AMB EXT VLDL: 30 MG/DL
AMB EXT VLDL: 30 MG/DL

## 2018-08-10 DIAGNOSIS — E11.9 DIABETES MELLITUS WITHOUT COMPLICATION (HCC): ICD-10-CM

## 2018-08-10 DIAGNOSIS — I10 ESSENTIAL HYPERTENSION, BENIGN: ICD-10-CM

## 2018-08-10 DIAGNOSIS — E78.00 PURE HYPERCHOLESTEROLEMIA: ICD-10-CM

## 2018-08-13 RX ORDER — LOSARTAN POTASSIUM AND HYDROCHLOROTHIAZIDE 25; 100 MG/1; MG/1
TABLET ORAL
Qty: 90 TABLET | Refills: 1 | Status: SHIPPED | OUTPATIENT
Start: 2018-08-13 | End: 2018-08-14

## 2018-08-13 RX ORDER — GLIMEPIRIDE 4 MG/1
TABLET ORAL
Qty: 90 TABLET | Refills: 1 | Status: SHIPPED | OUTPATIENT
Start: 2018-08-13 | End: 2018-08-14

## 2018-08-13 RX ORDER — ATORVASTATIN CALCIUM 20 MG/1
TABLET, FILM COATED ORAL
Qty: 90 TABLET | Refills: 1 | Status: SHIPPED | OUTPATIENT
Start: 2018-08-13 | End: 2018-08-14

## 2018-08-14 RX ORDER — LOSARTAN POTASSIUM AND HYDROCHLOROTHIAZIDE 25; 100 MG/1; MG/1
TABLET ORAL
Qty: 90 TABLET | Refills: 1 | Status: SHIPPED | OUTPATIENT
Start: 2018-08-14 | End: 2019-02-13

## 2018-08-14 RX ORDER — GLIMEPIRIDE 4 MG/1
TABLET ORAL
Qty: 90 TABLET | Refills: 1 | Status: SHIPPED | OUTPATIENT
Start: 2018-08-14 | End: 2019-02-04

## 2018-08-14 RX ORDER — ATORVASTATIN CALCIUM 20 MG/1
TABLET, FILM COATED ORAL
Qty: 90 TABLET | Refills: 1 | Status: SHIPPED | OUTPATIENT
Start: 2018-08-14 | End: 2019-02-04

## 2018-08-20 ENCOUNTER — TELEPHONE (OUTPATIENT)
Dept: INTERNAL MEDICINE CLINIC | Facility: CLINIC | Age: 53
End: 2018-08-20

## 2018-08-20 NOTE — TELEPHONE ENCOUNTER
Patient received letter that labs were outstanding; she just got them done a week ago; did she miss one that she still needs?   Please check with SM and callback

## 2018-08-22 DIAGNOSIS — R19.7 VOMITING AND DIARRHEA: ICD-10-CM

## 2018-08-22 DIAGNOSIS — R11.10 VOMITING AND DIARRHEA: ICD-10-CM

## 2018-08-23 RX ORDER — ONDANSETRON 4 MG/1
TABLET, FILM COATED ORAL
Qty: 30 TABLET | Refills: 0 | OUTPATIENT
Start: 2018-08-23

## 2018-08-23 NOTE — TELEPHONE ENCOUNTER
Patient has Ondansetron at home and does not need a refill. Assuming this was an automated refill request from pharmacy. Patient states disregard refill request; does not need.

## 2018-08-24 DIAGNOSIS — F41.1 ANXIETY STATE: ICD-10-CM

## 2018-08-25 RX ORDER — ALPRAZOLAM 0.5 MG/1
TABLET ORAL
Qty: 40 TABLET | Refills: 0 | OUTPATIENT
Start: 2018-08-25

## 2018-08-29 DIAGNOSIS — F41.1 ANXIETY STATE: ICD-10-CM

## 2018-08-30 ENCOUNTER — TELEPHONE (OUTPATIENT)
Dept: INTERNAL MEDICINE CLINIC | Facility: CLINIC | Age: 53
End: 2018-08-30

## 2018-08-30 RX ORDER — ALPRAZOLAM 0.5 MG/1
TABLET ORAL
Qty: 40 TABLET | Refills: 0 | Status: SHIPPED | OUTPATIENT
Start: 2018-08-30 | End: 2018-09-21

## 2018-09-14 ENCOUNTER — OFFICE VISIT (OUTPATIENT)
Dept: RHEUMATOLOGY | Facility: CLINIC | Age: 53
End: 2018-09-14

## 2018-09-14 VITALS
BODY MASS INDEX: 35.17 KG/M2 | WEIGHT: 206 LBS | SYSTOLIC BLOOD PRESSURE: 104 MMHG | DIASTOLIC BLOOD PRESSURE: 76 MMHG | HEIGHT: 64 IN | HEART RATE: 92 BPM | RESPIRATION RATE: 16 BRPM

## 2018-09-14 DIAGNOSIS — M16.0 PRIMARY OSTEOARTHRITIS OF HIPS, BILATERAL: Primary | ICD-10-CM

## 2018-09-14 DIAGNOSIS — M17.0 PRIMARY OSTEOARTHRITIS OF BOTH KNEES: ICD-10-CM

## 2018-09-14 PROCEDURE — 99213 OFFICE O/P EST LOW 20 MIN: CPT | Performed by: INTERNAL MEDICINE

## 2018-09-14 RX ORDER — MELOXICAM 15 MG/1
15 TABLET ORAL DAILY
Qty: 90 TABLET | Refills: 1 | Status: SHIPPED | OUTPATIENT
Start: 2018-09-14 | End: 2018-12-14

## 2018-09-14 RX ORDER — HYDROCODONE BITARTRATE AND ACETAMINOPHEN 10; 325 MG/1; MG/1
1 TABLET ORAL EVERY 4 HOURS PRN
Qty: 150 TABLET | Refills: 0 | Status: SHIPPED | OUTPATIENT
Start: 2018-09-14 | End: 2018-10-14

## 2018-09-14 RX ORDER — HYDROCODONE BITARTRATE AND ACETAMINOPHEN 10; 325 MG/1; MG/1
1 TABLET ORAL EVERY 4 HOURS PRN
Qty: 150 TABLET | Refills: 0 | Status: SHIPPED | OUTPATIENT
Start: 2018-11-13 | End: 2018-12-13

## 2018-09-14 RX ORDER — HYDROCODONE BITARTRATE AND ACETAMINOPHEN 10; 325 MG/1; MG/1
1 TABLET ORAL EVERY 4 HOURS PRN
Qty: 150 TABLET | Refills: 0 | Status: SHIPPED | OUTPATIENT
Start: 2018-10-14 | End: 2018-11-13

## 2018-09-14 NOTE — PATIENT INSTRUCTIONS
Has severe advanced osteoarthritis in hips which leads her to be disabled. For control of the pain she should take Norco 10 mg 1 every 4-6 hours up to 5 a day.   Also for the arthritis pain take meloxicam 15 mg per day he can be split in half if you would

## 2018-09-14 NOTE — PROGRESS NOTES
[facility]  Dr. Mateo Braswell Progress Note     Subjective:   Maliha Maldonado is a(n) 45year old female. Current complaints: Patient presents with:  Osteoarthritis: 3 month f/u. Labs 8/9/18 done. Pt does not have insurance-pt waiting for disability.  Cont

## 2018-09-21 DIAGNOSIS — F41.1 ANXIETY STATE: ICD-10-CM

## 2018-09-25 RX ORDER — ALPRAZOLAM 0.5 MG/1
TABLET ORAL
Qty: 40 TABLET | Refills: 0 | Status: SHIPPED | OUTPATIENT
Start: 2018-09-25 | End: 2018-10-17

## 2018-10-04 ENCOUNTER — OFFICE VISIT (OUTPATIENT)
Dept: INTERNAL MEDICINE CLINIC | Facility: CLINIC | Age: 53
End: 2018-10-04

## 2018-10-04 VITALS
OXYGEN SATURATION: 98 % | HEIGHT: 64.5 IN | DIASTOLIC BLOOD PRESSURE: 76 MMHG | RESPIRATION RATE: 16 BRPM | TEMPERATURE: 99 F | HEART RATE: 105 BPM | WEIGHT: 214.5 LBS | SYSTOLIC BLOOD PRESSURE: 120 MMHG | BODY MASS INDEX: 36.18 KG/M2

## 2018-10-04 DIAGNOSIS — G43.009 MIGRAINE WITHOUT AURA AND WITHOUT STATUS MIGRAINOSUS, NOT INTRACTABLE: ICD-10-CM

## 2018-10-04 DIAGNOSIS — Z00.00 ROUTINE GENERAL MEDICAL EXAMINATION AT A HEALTH CARE FACILITY: Primary | ICD-10-CM

## 2018-10-04 DIAGNOSIS — Z00.00 LABORATORY EXAMINATION ORDERED AS PART OF A ROUTINE GENERAL MEDICAL EXAMINATION: ICD-10-CM

## 2018-10-04 DIAGNOSIS — Z12.31 ENCOUNTER FOR SCREENING MAMMOGRAM FOR MALIGNANT NEOPLASM OF BREAST: ICD-10-CM

## 2018-10-04 DIAGNOSIS — J01.90 ACUTE SINUSITIS, RECURRENCE NOT SPECIFIED, UNSPECIFIED LOCATION: ICD-10-CM

## 2018-10-04 PROCEDURE — 99396 PREV VISIT EST AGE 40-64: CPT | Performed by: FAMILY MEDICINE

## 2018-10-04 RX ORDER — AMOXICILLIN 875 MG/1
875 TABLET, COATED ORAL 2 TIMES DAILY
Qty: 20 TABLET | Refills: 0 | Status: SHIPPED | OUTPATIENT
Start: 2018-10-04 | End: 2019-02-13

## 2018-10-04 RX ORDER — BUTALBITAL, ACETAMINOPHEN AND CAFFEINE 300; 40; 50 MG/1; MG/1; MG/1
1 CAPSULE ORAL EVERY 4 HOURS PRN
Qty: 30 CAPSULE | Refills: 1 | Status: SHIPPED | OUTPATIENT
Start: 2018-10-04 | End: 2018-10-18

## 2018-10-04 NOTE — PROGRESS NOTES
HPI:   Maliha Maldonado is a 52year old female who presents for a complete physical exam. Pt sees Gyne for her pap smears.  Symptoms: denies discharge, itching, burning or dysuria, is menopausal.  Regular SBE- yes,Sexually active- no,  Contraception- menopa 09/05/2014     (H) 09/19/2013     (H) 03/05/2013     Lab Results   Component Value Date    CHOLEST 236 (H) 06/06/2016    CHOLEST 233 (H) 09/05/2014    CHOLEST 165 09/19/2013     Lab Results   Component Value Date    HDL 44 (L) 06/06/2016    H 0   HYDROcodone-acetaminophen (NORCO)  MG Oral Tab Take 1 tablet by mouth every 4 (four) hours as needed for Pain (up to 5 per day).  Disp: 150 tablet Rfl: 0   HYDROcodone-acetaminophen  MG Oral Tab Take 1 tablet by mouth every 4 (four) hours as back pain, + OA and RA knees,hips  NEURO: + headaches  PSYCHE: + depression and anxiety  HEMATOLOGIC:  hx of anemia  ENDOCRINE: denies thyroid history,+DM  ALL/ASTHMA: denies hx of allergy or asthma    EXAM:   /76 (BP Location: Right arm, Patient Pos Routine general medical examination at a health care facility  The patient is asked to return for CPX in one year. 2. Encounter for screening mammogram for malignant neoplasm of breast    - FIORELLA SCREENING BILAT (CPT=77067); Future    4.  Migraine without

## 2018-10-13 DIAGNOSIS — F41.1 ANXIETY STATE: ICD-10-CM

## 2018-10-16 RX ORDER — ALPRAZOLAM 0.5 MG/1
TABLET ORAL
Qty: 40 TABLET | Refills: 0
Start: 2018-10-16

## 2018-10-17 DIAGNOSIS — F41.1 ANXIETY STATE: ICD-10-CM

## 2018-10-19 NOTE — TELEPHONE ENCOUNTER
Refill requested:   Requested Prescriptions     Pending Prescriptions Disp Refills   • ALPRAZOLAM 0.5 MG Oral Tab [Pharmacy Med Name: ALPRAZolam Oral Tablet 0.5 MG] 40 tablet 0     Sig: TAKE 1 TABLET BY MOUTH THREE TIMES A DAY AS NEEDED       Failed protoc

## 2018-10-22 RX ORDER — ALPRAZOLAM 0.5 MG/1
TABLET ORAL
Qty: 40 TABLET | Refills: 0 | Status: SHIPPED | OUTPATIENT
Start: 2018-10-22 | End: 2018-11-20

## 2018-10-25 ENCOUNTER — TELEPHONE (OUTPATIENT)
Dept: RHEUMATOLOGY | Facility: CLINIC | Age: 53
End: 2018-10-25

## 2018-10-31 RX ORDER — BUTALBITAL, ACETAMINOPHEN AND CAFFEINE 300; 40; 50 MG/1; MG/1; MG/1
CAPSULE ORAL
Qty: 30 CAPSULE | Refills: 0 | OUTPATIENT
Start: 2018-10-31

## 2018-11-09 ENCOUNTER — TELEPHONE (OUTPATIENT)
Dept: INTERNAL MEDICINE CLINIC | Facility: CLINIC | Age: 53
End: 2018-11-09

## 2018-11-09 NOTE — TELEPHONE ENCOUNTER
Patient has a question on a prescription that she just picked up on Paroxetine, states it was prescribed by [facility] but not showing in the chart but that she only received 10 pills instead of 30.   Patient was told that we will call her and we will call the

## 2018-11-09 NOTE — TELEPHONE ENCOUNTER
Spoke with the patient and explained that she had too many prescriptions sitting at the pharmacy that had never been picked up and the tech and the pharmacy just picked a smaller dosage Rx that was there.  I have since cleared out all the outstanding Rx's f

## 2018-11-10 DIAGNOSIS — F41.1 ANXIETY STATE: ICD-10-CM

## 2018-11-12 RX ORDER — ALPRAZOLAM 0.5 MG/1
TABLET ORAL
Qty: 40 TABLET | Refills: 0 | OUTPATIENT
Start: 2018-11-12

## 2018-11-14 ENCOUNTER — TELEPHONE (OUTPATIENT)
Dept: INTERNAL MEDICINE CLINIC | Facility: CLINIC | Age: 53
End: 2018-11-14

## 2018-11-14 DIAGNOSIS — F41.1 ANXIETY STATE: ICD-10-CM

## 2018-11-14 RX ORDER — ALPRAZOLAM 0.5 MG/1
TABLET ORAL
Qty: 40 TABLET | Refills: 0 | OUTPATIENT
Start: 2018-11-14

## 2018-11-14 RX ORDER — PAROXETINE HYDROCHLORIDE 40 MG/1
TABLET, FILM COATED ORAL
Qty: 30 TABLET | Refills: 3 | Status: SHIPPED | OUTPATIENT
Start: 2018-11-14 | End: 2019-02-13

## 2018-11-14 NOTE — TELEPHONE ENCOUNTER
Advised pt to contact pharmacy re recall of Elsy Diaz to see if her rx was one of the affected lot and manufactures

## 2018-11-14 NOTE — TELEPHONE ENCOUNTER
Patient calling in requesting to speak with the Nurse regarding the RX recall for Losartan. Please call pt when time permits.

## 2018-11-20 DIAGNOSIS — F41.1 ANXIETY STATE: ICD-10-CM

## 2018-11-21 RX ORDER — ALPRAZOLAM 0.5 MG/1
TABLET ORAL
Qty: 40 TABLET | Refills: 0 | Status: SHIPPED | OUTPATIENT
Start: 2018-11-21 | End: 2018-12-16

## 2018-12-11 DIAGNOSIS — F41.1 ANXIETY STATE: ICD-10-CM

## 2018-12-12 RX ORDER — ALPRAZOLAM 0.5 MG/1
TABLET ORAL
Qty: 40 TABLET | Refills: 0 | OUTPATIENT
Start: 2018-12-12

## 2018-12-12 NOTE — TELEPHONE ENCOUNTER
Requesting ALPRAZOLAM 0.5 MG Oral Tab  LOV: 10/4/18  RTC: 1 year  Filled: 11/21/18 #40 with 0 refills    Future Appointments   Date Time Provider Larisa Diallo   07/61/1012 67:86 AM Franne Angelucci, MD [facility] Augusta Avila

## 2018-12-14 ENCOUNTER — OFFICE VISIT (OUTPATIENT)
Dept: RHEUMATOLOGY | Facility: CLINIC | Age: 53
End: 2018-12-14

## 2018-12-14 VITALS
HEART RATE: 76 BPM | RESPIRATION RATE: 20 BRPM | DIASTOLIC BLOOD PRESSURE: 64 MMHG | BODY MASS INDEX: 35 KG/M2 | SYSTOLIC BLOOD PRESSURE: 108 MMHG | WEIGHT: 210 LBS

## 2018-12-14 DIAGNOSIS — M16.0 PRIMARY OSTEOARTHRITIS OF HIPS, BILATERAL: Primary | ICD-10-CM

## 2018-12-14 DIAGNOSIS — M17.0 PRIMARY OSTEOARTHRITIS OF BOTH KNEES: ICD-10-CM

## 2018-12-14 DIAGNOSIS — E66.9 CLASS 2 OBESITY WITHOUT SERIOUS COMORBIDITY IN ADULT, UNSPECIFIED BMI, UNSPECIFIED OBESITY TYPE: ICD-10-CM

## 2018-12-14 PROCEDURE — 99213 OFFICE O/P EST LOW 20 MIN: CPT | Performed by: INTERNAL MEDICINE

## 2018-12-14 RX ORDER — MELOXICAM 15 MG/1
15 TABLET ORAL DAILY
Qty: 90 TABLET | Refills: 1 | Status: SHIPPED | OUTPATIENT
Start: 2018-12-14 | End: 2019-02-13

## 2018-12-14 RX ORDER — HYDROCODONE BITARTRATE AND ACETAMINOPHEN 10; 325 MG/1; MG/1
1 TABLET ORAL EVERY 4 HOURS PRN
Qty: 150 TABLET | Refills: 0 | Status: SHIPPED | OUTPATIENT
Start: 2019-01-13 | End: 2019-06-12

## 2018-12-14 RX ORDER — METHYLPREDNISOLONE 4 MG/1
TABLET ORAL
Qty: 1 KIT | Refills: 0 | Status: SHIPPED | OUTPATIENT
Start: 2018-12-14 | End: 2019-02-13 | Stop reason: ALTCHOICE

## 2018-12-14 RX ORDER — HYDROCODONE BITARTRATE AND ACETAMINOPHEN 10; 325 MG/1; MG/1
1 TABLET ORAL EVERY 4 HOURS PRN
Qty: 150 TABLET | Refills: 0 | Status: SHIPPED | OUTPATIENT
Start: 2019-02-13 | End: 2019-02-13

## 2018-12-14 RX ORDER — HYDROCODONE BITARTRATE AND ACETAMINOPHEN 10; 325 MG/1; MG/1
TABLET ORAL
Qty: 150 TABLET | Refills: 0 | Status: SHIPPED | OUTPATIENT
Start: 2018-12-14 | End: 2019-02-13

## 2018-12-14 NOTE — PROGRESS NOTES
[facility]  Dr. Chinmay Ramirez Progress Note     Subjective:   Venkata Knapp is a(n) 52year old female. Current complaints: Patient presents with:  Osteoarthritis: 3 month f/u   Refill Request: norco, meloxicam  C/o aches and pains, hips and back.   Trou

## 2018-12-14 NOTE — PATIENT INSTRUCTIONS
Use heating pad as needed. Apply otc creams also to elbow. Avoid heavy lifting. Use Meloxicam daily. Use Norco 10 mg every 4-6 hours as needed for pain. Use Medrol dose pack for the left elbow pain. Return to office 3 months.

## 2018-12-16 DIAGNOSIS — F41.1 ANXIETY STATE: ICD-10-CM

## 2018-12-17 RX ORDER — ALPRAZOLAM 0.5 MG/1
TABLET ORAL
Qty: 40 TABLET | Refills: 0 | Status: SHIPPED | OUTPATIENT
Start: 2018-12-17 | End: 2019-01-14

## 2018-12-17 NOTE — TELEPHONE ENCOUNTER
Alprazolam  Last OV relevant to medication: 10/4/18  Last refill date: 11/21/18  #/refills: 0  When pt was asked to return for OV: none  Upcoming appt/reason: none

## 2019-01-14 DIAGNOSIS — F41.1 ANXIETY STATE: ICD-10-CM

## 2019-01-15 RX ORDER — ALPRAZOLAM 0.5 MG/1
TABLET ORAL
Qty: 40 TABLET | Refills: 0 | Status: SHIPPED | OUTPATIENT
Start: 2019-01-15 | End: 2019-02-13

## 2019-01-15 NOTE — TELEPHONE ENCOUNTER
Alprazolam  Last OV relevant to medication: 7/16/18  Last refill date: 12/17/18  #/refills: 0  When pt was asked to return for OV: 6 months  Upcoming appt/reason: none  Recent labs: none

## 2019-02-01 DIAGNOSIS — F41.1 ANXIETY STATE: ICD-10-CM

## 2019-02-02 RX ORDER — ALPRAZOLAM 0.5 MG/1
TABLET ORAL
Qty: 40 TABLET | Refills: 0 | OUTPATIENT
Start: 2019-02-02

## 2019-02-02 NOTE — TELEPHONE ENCOUNTER
Requesting ALPRAZOLAM 0.5 MG Oral Tab  LOV: 10/4/18  RTC: 1 year  Last Relevant Labs: 8/16/18  Filled: 1/15/19 #40 with 0 refills    Future Appointments   Date Time Provider Larisa Diallo   2/21/2733  5:15 PM Hussein Acharya MD [facility] Mayur Auguste

## 2019-02-04 DIAGNOSIS — E78.00 PURE HYPERCHOLESTEROLEMIA: ICD-10-CM

## 2019-02-04 DIAGNOSIS — E11.9 DIABETES MELLITUS WITHOUT COMPLICATION (HCC): ICD-10-CM

## 2019-02-04 DIAGNOSIS — F41.1 ANXIETY STATE: ICD-10-CM

## 2019-02-04 RX ORDER — GLIMEPIRIDE 4 MG/1
TABLET ORAL
Qty: 90 TABLET | Refills: 0 | Status: SHIPPED | OUTPATIENT
Start: 2019-02-04 | End: 2019-05-05

## 2019-02-04 RX ORDER — ATORVASTATIN CALCIUM 20 MG/1
TABLET, FILM COATED ORAL
Qty: 90 TABLET | Refills: 0 | Status: SHIPPED | OUTPATIENT
Start: 2019-02-04 | End: 2019-05-05

## 2019-02-04 RX ORDER — ALPRAZOLAM 0.5 MG/1
TABLET ORAL
Qty: 40 TABLET | Refills: 0 | OUTPATIENT
Start: 2019-02-04

## 2019-02-04 NOTE — TELEPHONE ENCOUNTER
In med scan on 8-11-18  Pt had ALT result of 16.   Please advise, I could not enter results    Atorvastatin   Failed per protocol due to:  Cholesterol Medication Protocol Failed2/4 5:17 PM   ALT < 80    ALT resulted within past year   Last OV relevant to me

## 2019-02-04 NOTE — TELEPHONE ENCOUNTER
Approved per protocol  Glimepiride  Last OV relevant to medication: 7-16-18  Last refill date: 8-14-18    #/refills: 1  When pt was asked to return for OV: 6 months  Upcoming appt/reason: none  Recent labs: 8-16-18: Lab result     Alprazolam  Last OV relevant to medication: 7-16-18  Last refill date: 1-15-19     #/refills: 0  When pt was asked to return for OV: 6 months  Upcoming appt/reason: none  Recent labs: 8-16-18: Lab result

## 2019-02-07 DIAGNOSIS — F41.1 ANXIETY STATE: ICD-10-CM

## 2019-02-08 DIAGNOSIS — F41.1 ANXIETY STATE: ICD-10-CM

## 2019-02-08 RX ORDER — ALPRAZOLAM 0.5 MG/1
TABLET ORAL
Qty: 40 TABLET | Refills: 0 | OUTPATIENT
Start: 2019-02-08

## 2019-02-13 ENCOUNTER — OFFICE VISIT (OUTPATIENT)
Dept: INTERNAL MEDICINE CLINIC | Facility: CLINIC | Age: 54
End: 2019-02-13
Payer: COMMERCIAL

## 2019-02-13 ENCOUNTER — TELEPHONE (OUTPATIENT)
Dept: INTERNAL MEDICINE CLINIC | Facility: CLINIC | Age: 54
End: 2019-02-13

## 2019-02-13 VITALS
HEART RATE: 100 BPM | TEMPERATURE: 99 F | DIASTOLIC BLOOD PRESSURE: 84 MMHG | SYSTOLIC BLOOD PRESSURE: 136 MMHG | HEIGHT: 64.5 IN | WEIGHT: 206.5 LBS | RESPIRATION RATE: 18 BRPM | BODY MASS INDEX: 34.83 KG/M2 | OXYGEN SATURATION: 97 %

## 2019-02-13 DIAGNOSIS — E11.9 DIABETES MELLITUS WITHOUT COMPLICATION (HCC): Primary | ICD-10-CM

## 2019-02-13 DIAGNOSIS — F41.1 ANXIETY STATE: ICD-10-CM

## 2019-02-13 DIAGNOSIS — E78.00 PURE HYPERCHOLESTEROLEMIA: ICD-10-CM

## 2019-02-13 DIAGNOSIS — I10 ESSENTIAL HYPERTENSION, BENIGN: ICD-10-CM

## 2019-02-13 PROCEDURE — 99214 OFFICE O/P EST MOD 30 MIN: CPT | Performed by: FAMILY MEDICINE

## 2019-02-13 RX ORDER — ALPRAZOLAM 0.5 MG/1
TABLET ORAL
Qty: 40 TABLET | Refills: 0 | Status: SHIPPED | OUTPATIENT
Start: 2019-02-13 | End: 2019-03-11

## 2019-02-13 RX ORDER — LOSARTAN POTASSIUM AND HYDROCHLOROTHIAZIDE 25; 100 MG/1; MG/1
TABLET ORAL
Qty: 90 TABLET | Refills: 1 | Status: SHIPPED | OUTPATIENT
Start: 2019-02-13 | End: 2019-08-29

## 2019-02-13 RX ORDER — PAROXETINE HYDROCHLORIDE 40 MG/1
TABLET, FILM COATED ORAL
Qty: 90 TABLET | Refills: 1 | Status: SHIPPED | OUTPATIENT
Start: 2019-02-13 | End: 2019-08-29

## 2019-02-13 NOTE — PROGRESS NOTES
HPI:   Howie Lowry is a 52year old female who presents for recheck of her diabetes. Patient’s FBS have been - not checking much but usually 100-130 when she does. Pt uses her  glucometer.  Pt did get her disability insurance and she is now on CHOLESTEROL, TOTAL (mg/dL)   Date Value   03/05/2013 147   12/28/2011 166   05/26/2011 157     CHOLESTEROL (mg/dL)   Date Value   09/19/2013 165     HDL Cholesterol (mg/dL)   Date Value   08/09/2018 40   08/09/2018 50   06/06/2016 44 (L)   09/05/2014 4 tablet Rfl: 1   Ondansetron HCl (ZOFRAN) 4 mg tablet TAKE 1 TABLET BY MOUTH EVERY EIGHT HOURS AS NEEDED FOR NAUSEA Disp: 30 tablet Rfl: 1        Etodolac                    Comment:GI UPSET   Past Medical History:   Diagnosis Date   • Anxiety    • Chest pa adenopathy,no bruits. No thyromegaly. LUNGS: clear to auscultation, no W/R/R.  CARDIO: RRR without murmur  GI: BS x4, normoactive, no masses or HSM. Soft without distention or tenderness  EXTREMITIES: no cyanosis, clubbing or edema;  Shoes and socks wer TIME A DAY  Dispense: 90 tablet; Refill: 1  - COMP METABOLIC PANEL (14)  - LIPID PANEL    3. Diabetes mellitus without complication (HCC)  Continue present medications. Fasting labs due, orders given  Please see your specialists as recommended.   Keep up w

## 2019-02-13 NOTE — TELEPHONE ENCOUNTER
Sent over fax of medical release form for diabetic eye exam. Placed to be scanned and made copy for tickler file on my desk.

## 2019-03-11 DIAGNOSIS — F41.1 ANXIETY STATE: ICD-10-CM

## 2019-03-12 RX ORDER — ALPRAZOLAM 0.5 MG/1
TABLET ORAL
Qty: 40 TABLET | Refills: 0 | Status: SHIPPED | OUTPATIENT
Start: 2019-03-12 | End: 2019-04-06

## 2019-03-12 NOTE — TELEPHONE ENCOUNTER
Alprazolam   Last OV relevant to medication: 2-13-19  Last refill date: 2-13-19   #/refills: 0  When pt was asked to return for OV: 6 months  Upcoming appt/reason: none  Recent labs: 8-9-18: Lipid/ ALT/ MicroAlb

## 2019-03-14 ENCOUNTER — OFFICE VISIT (OUTPATIENT)
Dept: RHEUMATOLOGY | Facility: CLINIC | Age: 54
End: 2019-03-14
Payer: COMMERCIAL

## 2019-03-14 VITALS
SYSTOLIC BLOOD PRESSURE: 124 MMHG | HEART RATE: 76 BPM | RESPIRATION RATE: 20 BRPM | WEIGHT: 214 LBS | DIASTOLIC BLOOD PRESSURE: 82 MMHG | BODY MASS INDEX: 36 KG/M2

## 2019-03-14 DIAGNOSIS — M16.0 PRIMARY OSTEOARTHRITIS OF HIPS, BILATERAL: ICD-10-CM

## 2019-03-14 DIAGNOSIS — M17.0 PRIMARY OSTEOARTHRITIS OF BOTH KNEES: Primary | ICD-10-CM

## 2019-03-14 PROCEDURE — 99213 OFFICE O/P EST LOW 20 MIN: CPT | Performed by: INTERNAL MEDICINE

## 2019-03-14 RX ORDER — HYDROCODONE BITARTRATE AND ACETAMINOPHEN 10; 325 MG/1; MG/1
TABLET ORAL
Qty: 150 TABLET | Refills: 0 | Status: SHIPPED | OUTPATIENT
Start: 2019-05-13 | End: 2019-08-29

## 2019-03-14 RX ORDER — HYDROCODONE BITARTRATE AND ACETAMINOPHEN 10; 325 MG/1; MG/1
TABLET ORAL
Qty: 150 TABLET | Refills: 0 | Status: SHIPPED | OUTPATIENT
Start: 2019-04-13 | End: 2019-06-12

## 2019-03-14 RX ORDER — HYDROCODONE BITARTRATE AND ACETAMINOPHEN 10; 325 MG/1; MG/1
TABLET ORAL
Qty: 150 TABLET | Refills: 0 | Status: SHIPPED | OUTPATIENT
Start: 2019-03-14 | End: 2019-06-12

## 2019-03-14 NOTE — PATIENT INSTRUCTIONS
Continue Meloxicam daily. }  Use Norco for pain 10 mg one every 4-6 hours up to 5 per day. Exercise as tolerated. Lose weight. Low calorie diet. Return to office 3 months.

## 2019-03-14 NOTE — PROGRESS NOTES
[facility]  Dr. Kar Bravo Progress Note     Subjective:   Erin Good is a(n) 52year old female. Current complaints: Patient presents with:  Osteoarthritis: 3 month f/u. Pt states 'weather is making joints more painful.'  Feeling status quo.   C/o

## 2019-04-06 DIAGNOSIS — F41.1 ANXIETY STATE: ICD-10-CM

## 2019-04-08 RX ORDER — ALPRAZOLAM 0.5 MG/1
TABLET ORAL
Qty: 40 TABLET | Refills: 0 | Status: SHIPPED | OUTPATIENT
Start: 2019-04-08 | End: 2019-05-01

## 2019-04-08 NOTE — TELEPHONE ENCOUNTER
Alprazolam   Last OV relevant to medication: 2-13-19  Last refill date: 3-12-19  #/refills: 0  When pt was asked to return for OV: 6 months  Upcoming appt/reason: none  Recent labs: none

## 2019-04-29 DIAGNOSIS — F41.1 ANXIETY STATE: ICD-10-CM

## 2019-04-29 RX ORDER — ALPRAZOLAM 0.5 MG/1
TABLET ORAL
Qty: 40 TABLET | Refills: 0 | OUTPATIENT
Start: 2019-04-29

## 2019-04-29 NOTE — TELEPHONE ENCOUNTER
Alprazolam   Last OV relevant to medication: 2-13-19  Last refill date: 4-8-19  #/refills: 0  When pt was asked to return for OV: 6 months  Upcoming appt/reason: none  Recent labs: none

## 2019-05-01 DIAGNOSIS — F41.1 ANXIETY STATE: ICD-10-CM

## 2019-05-02 RX ORDER — ALPRAZOLAM 0.5 MG/1
TABLET ORAL
Qty: 40 TABLET | Refills: 0 | Status: SHIPPED | OUTPATIENT
Start: 2019-05-02 | End: 2019-05-25

## 2019-05-02 NOTE — TELEPHONE ENCOUNTER
Alprazolam  Last OV relevant to medication: 2-13-19  Last refill date: 4-8-19  #/refills: 0  When pt was asked to return for OV:6 months  Upcoming appt/reason: none  Recent labs: none

## 2019-05-03 ENCOUNTER — TELEPHONE (OUTPATIENT)
Dept: INTERNAL MEDICINE CLINIC | Facility: CLINIC | Age: 54
End: 2019-05-03

## 2019-05-03 DIAGNOSIS — E11.9 DIABETES MELLITUS WITHOUT COMPLICATION (HCC): Primary | ICD-10-CM

## 2019-05-03 DIAGNOSIS — E87.6 LOW BLOOD POTASSIUM: ICD-10-CM

## 2019-05-03 DIAGNOSIS — E78.00 PURE HYPERCHOLESTEROLEMIA: ICD-10-CM

## 2019-05-03 RX ORDER — POTASSIUM CHLORIDE 20 MEQ/1
20 TABLET, EXTENDED RELEASE ORAL 2 TIMES DAILY
Qty: 8 TABLET | Refills: 0 | Status: SHIPPED | OUTPATIENT
Start: 2019-05-03 | End: 2019-05-07

## 2019-05-05 DIAGNOSIS — E11.9 DIABETES MELLITUS WITHOUT COMPLICATION (HCC): ICD-10-CM

## 2019-05-05 DIAGNOSIS — E78.00 PURE HYPERCHOLESTEROLEMIA: ICD-10-CM

## 2019-05-06 RX ORDER — ATORVASTATIN CALCIUM 20 MG/1
TABLET, FILM COATED ORAL
Qty: 90 TABLET | Refills: 0 | Status: SHIPPED | OUTPATIENT
Start: 2019-05-06 | End: 2019-07-07

## 2019-05-06 RX ORDER — GLIMEPIRIDE 4 MG/1
TABLET ORAL
Qty: 90 TABLET | Refills: 0 | Status: SHIPPED | OUTPATIENT
Start: 2019-05-06 | End: 2019-07-07

## 2019-05-06 NOTE — TELEPHONE ENCOUNTER
Glimepiride   Last OV relevant to medication: 2-13-19  Last refill date: 2-4-19  #/refills: 0  When pt was asked to return for OV: 6months  Upcoming appt/reason: none  Recent labs: 5-1-19: HgBA1C

## 2019-05-21 DIAGNOSIS — F41.1 ANXIETY STATE: ICD-10-CM

## 2019-05-21 RX ORDER — ALPRAZOLAM 0.5 MG/1
TABLET ORAL
Qty: 40 TABLET | Refills: 0 | OUTPATIENT
Start: 2019-05-21

## 2019-05-21 NOTE — TELEPHONE ENCOUNTER
Alprazolam  Last OV relevant to medication: 2-13-19  Last refill date: 5-2-19  #/refills: 0  When pt was asked to return for OV: 6months  Upcoming appt/reason: none  Recent labs: none

## 2019-05-22 DIAGNOSIS — F41.1 ANXIETY STATE: ICD-10-CM

## 2019-05-23 RX ORDER — ALPRAZOLAM 0.5 MG/1
TABLET ORAL
Qty: 40 TABLET | Refills: 0 | OUTPATIENT
Start: 2019-05-23

## 2019-05-25 DIAGNOSIS — F41.1 ANXIETY STATE: ICD-10-CM

## 2019-05-28 RX ORDER — ALPRAZOLAM 0.5 MG/1
TABLET ORAL
Qty: 40 TABLET | Refills: 0 | Status: SHIPPED | OUTPATIENT
Start: 2019-05-28 | End: 2019-06-23

## 2019-06-12 ENCOUNTER — OFFICE VISIT (OUTPATIENT)
Dept: RHEUMATOLOGY | Facility: CLINIC | Age: 54
End: 2019-06-12
Payer: COMMERCIAL

## 2019-06-12 VITALS
RESPIRATION RATE: 20 BRPM | WEIGHT: 204 LBS | BODY MASS INDEX: 34 KG/M2 | HEART RATE: 84 BPM | SYSTOLIC BLOOD PRESSURE: 112 MMHG | DIASTOLIC BLOOD PRESSURE: 84 MMHG

## 2019-06-12 DIAGNOSIS — M17.0 PRIMARY OSTEOARTHRITIS OF BOTH KNEES: ICD-10-CM

## 2019-06-12 DIAGNOSIS — M16.0 PRIMARY OSTEOARTHRITIS OF HIPS, BILATERAL: Primary | ICD-10-CM

## 2019-06-12 PROBLEM — R25.2 CRAMPS, EXTREMITY: Status: ACTIVE | Noted: 2019-06-12

## 2019-06-12 PROCEDURE — 99213 OFFICE O/P EST LOW 20 MIN: CPT | Performed by: INTERNAL MEDICINE

## 2019-06-12 RX ORDER — CYCLOBENZAPRINE HCL 5 MG
5 TABLET ORAL 2 TIMES DAILY PRN
Qty: 60 TABLET | Refills: 2 | Status: SHIPPED | OUTPATIENT
Start: 2019-06-12 | End: 2019-11-06

## 2019-06-12 RX ORDER — HYDROCODONE BITARTRATE AND ACETAMINOPHEN 10; 325 MG/1; MG/1
TABLET ORAL
Qty: 150 TABLET | Refills: 0 | Status: SHIPPED | OUTPATIENT
Start: 2019-06-12 | End: 2020-02-25

## 2019-06-12 RX ORDER — HYDROCODONE BITARTRATE AND ACETAMINOPHEN 10; 325 MG/1; MG/1
TABLET ORAL
Qty: 150 TABLET | Refills: 0 | Status: SHIPPED | OUTPATIENT
Start: 2019-07-12 | End: 2019-08-29

## 2019-06-12 RX ORDER — HYDROCODONE BITARTRATE AND ACETAMINOPHEN 10; 325 MG/1; MG/1
1 TABLET ORAL EVERY 4 HOURS PRN
Qty: 150 TABLET | Refills: 0 | Status: SHIPPED | OUTPATIENT
Start: 2019-08-11 | End: 2019-08-29

## 2019-06-12 NOTE — PROGRESS NOTES
[facility]  Dr. Michelle Suazo Progress Note     Subjective:   Olivia Monk is a(n) 52year old female. Current complaints: Patient presents with:  Osteoarthritis: 3 month f/u  C/o troubles with both hips. They are painful.   C/o hand cramps, hard to hi

## 2019-06-12 NOTE — PATIENT INSTRUCTIONS
See orthopedics, Dr Jorge Del Valle of Dr Mabel Go  about end stage arthritis in the hips. Use Norco 10 mg every 4-6 hours for pain up to 5 per day. Use Cyclobenzaprine 5 mg twice a day for muscle spasm. Activity as tolerated. Return to office 3 months.

## 2019-06-20 DIAGNOSIS — F41.1 ANXIETY STATE: ICD-10-CM

## 2019-06-20 RX ORDER — ALPRAZOLAM 0.5 MG/1
TABLET ORAL
Qty: 40 TABLET | Refills: 0 | OUTPATIENT
Start: 2019-06-20

## 2019-06-20 NOTE — TELEPHONE ENCOUNTER
Alprazolam  Last OV relevant to medication: 2-13-19  Last refill date: 5-28-19 #/refills: 0  When pt was asked to return for OV:6 months  Upcoming appt/reason: none  Recent labs: none

## 2019-06-23 DIAGNOSIS — F41.1 ANXIETY STATE: ICD-10-CM

## 2019-06-24 RX ORDER — ALPRAZOLAM 0.5 MG/1
TABLET ORAL
Qty: 40 TABLET | Refills: 0 | Status: SHIPPED
Start: 2019-06-24 | End: 2019-07-15

## 2019-07-07 DIAGNOSIS — F41.1 ANXIETY STATE: ICD-10-CM

## 2019-07-07 DIAGNOSIS — E78.00 PURE HYPERCHOLESTEROLEMIA: ICD-10-CM

## 2019-07-07 DIAGNOSIS — E11.9 DIABETES MELLITUS WITHOUT COMPLICATION (HCC): ICD-10-CM

## 2019-07-08 RX ORDER — GLIMEPIRIDE 4 MG/1
TABLET ORAL
Qty: 90 TABLET | Refills: 0 | Status: SHIPPED | OUTPATIENT
Start: 2019-07-08 | End: 2019-08-29

## 2019-07-08 RX ORDER — ALPRAZOLAM 0.5 MG/1
TABLET ORAL
Qty: 40 TABLET | Refills: 0 | OUTPATIENT
Start: 2019-07-08

## 2019-07-08 NOTE — TELEPHONE ENCOUNTER
Failed protocol - Alprazolam   Passed protocol - Glimepiride     Last HA1C - 5/1/2019    LOV:  2/13/2019 Willow Blevins RTC 6 months     3. Diabetes mellitus without complication ([facility] [address]  Continue present medications. Fasting labs due, orders given  Please see your specialists as recommended. Keep up with regular eye exams and Check feet daily. Stay on your low salt, diabetic diet. Stay as active as you can. See me back as scheduled.     1. Anxiety state  - stable, continue medication     No FOV scheduled

## 2019-07-09 RX ORDER — ATORVASTATIN CALCIUM 20 MG/1
TABLET, FILM COATED ORAL
Qty: 30 TABLET | Refills: 3 | Status: SHIPPED | OUTPATIENT
Start: 2019-07-09 | End: 2019-10-05

## 2019-07-10 DIAGNOSIS — F41.1 ANXIETY STATE: ICD-10-CM

## 2019-07-11 RX ORDER — ALPRAZOLAM 0.5 MG/1
TABLET ORAL
Qty: 40 TABLET | Refills: 0 | OUTPATIENT
Start: 2019-07-11

## 2019-07-11 NOTE — TELEPHONE ENCOUNTER
Alprazolam  Last OV relevant to medication: 2-13-19  Last refill date: 6-24-19  #/refills: 0  When pt was asked to return for OV: 6 months  Upcoming appt/reason: none  Recent labs: none

## 2019-07-15 DIAGNOSIS — F41.1 ANXIETY STATE: ICD-10-CM

## 2019-07-15 RX ORDER — ALPRAZOLAM 0.5 MG/1
TABLET ORAL
Qty: 40 TABLET | Refills: 0 | Status: SHIPPED | OUTPATIENT
Start: 2019-07-15 | End: 2019-08-11

## 2019-07-15 NOTE — TELEPHONE ENCOUNTER
Patient previously prescribed quantity of 40 tablets.  Requesting larger quantity because it does not last her a month

## 2019-07-15 NOTE — TELEPHONE ENCOUNTER
Last OV : 2/13/2019 Med f/u  Upcoming appt/reason:    Future Appointments   Date Time Provider Larisa Diallo   4/96/1756  6:85 PM Flory Joy MD [facility]     ALPRAZOLAM 0.5 MG Oral Tab 40 tablet 0 6/24/2019     Last labs: 5/1/19  When

## 2019-08-01 DIAGNOSIS — F41.1 ANXIETY STATE: ICD-10-CM

## 2019-08-02 NOTE — TELEPHONE ENCOUNTER
Alprazolam 0.5 mg  Last OV relevant to medication: 2-13-19  Last refill date: 7-15-19  #/refills: 0  When pt was asked to return for OV: 6 mo.   Upcoming appt/reason: none  Recent labs: none

## 2019-08-04 DIAGNOSIS — F41.1 ANXIETY STATE: ICD-10-CM

## 2019-08-04 RX ORDER — ALPRAZOLAM 0.5 MG/1
TABLET ORAL
Qty: 40 TABLET | Refills: 0 | OUTPATIENT
Start: 2019-08-04

## 2019-08-05 RX ORDER — ALPRAZOLAM 0.5 MG/1
TABLET ORAL
Qty: 40 TABLET | Refills: 0 | OUTPATIENT
Start: 2019-08-05

## 2019-08-11 DIAGNOSIS — F41.1 ANXIETY STATE: ICD-10-CM

## 2019-08-12 RX ORDER — ALPRAZOLAM 0.5 MG/1
TABLET ORAL
Qty: 40 TABLET | Refills: 0 | Status: SHIPPED | OUTPATIENT
Start: 2019-08-12 | End: 2019-09-11

## 2019-08-12 NOTE — TELEPHONE ENCOUNTER
Alprazolam 0.5 MG  Last OV relevant to medication: 2-13-19  Last refill date: 7-15-19  #/refills: 0  When pt was asked to return for OV: 6 mo.   Upcoming appt/reason: 8-22-19  Recent labs: none

## 2019-08-20 PROBLEM — Z87.442 HISTORY OF KIDNEY STONES: Status: ACTIVE | Noted: 2019-08-20

## 2019-08-20 PROBLEM — I15.2 HYPERTENSION ASSOCIATED WITH TYPE 2 DIABETES MELLITUS (HCC): Status: ACTIVE | Noted: 2019-08-20

## 2019-08-20 PROBLEM — M47.816 OSTEOARTHRITIS OF LUMBAR SPINE: Status: ACTIVE | Noted: 2019-08-20

## 2019-08-20 PROBLEM — I15.2 HYPERTENSION ASSOCIATED WITH TYPE 2 DIABETES MELLITUS  (HCC): Status: ACTIVE | Noted: 2019-08-20

## 2019-08-20 PROBLEM — I15.2 HYPERTENSION ASSOCIATED WITH TYPE 2 DIABETES MELLITUS: Status: ACTIVE | Noted: 2019-08-20

## 2019-08-20 PROBLEM — E11.59 HYPERTENSION ASSOCIATED WITH TYPE 2 DIABETES MELLITUS: Status: ACTIVE | Noted: 2019-08-20

## 2019-08-20 PROBLEM — E11.59 HYPERTENSION ASSOCIATED WITH TYPE 2 DIABETES MELLITUS (HCC): Status: ACTIVE | Noted: 2019-08-20

## 2019-08-20 PROBLEM — E11.59 HYPERTENSION ASSOCIATED WITH TYPE 2 DIABETES MELLITUS  (HCC): Status: ACTIVE | Noted: 2019-08-20

## 2019-08-29 ENCOUNTER — OFFICE VISIT (OUTPATIENT)
Dept: INTERNAL MEDICINE CLINIC | Facility: CLINIC | Age: 54
End: 2019-08-29
Payer: MEDICARE

## 2019-08-29 ENCOUNTER — APPOINTMENT (OUTPATIENT)
Dept: LAB | Age: 54
End: 2019-08-29
Attending: FAMILY MEDICINE
Payer: MEDICARE

## 2019-08-29 VITALS
HEIGHT: 64.5 IN | HEART RATE: 107 BPM | BODY MASS INDEX: 34.32 KG/M2 | DIASTOLIC BLOOD PRESSURE: 82 MMHG | TEMPERATURE: 98 F | OXYGEN SATURATION: 99 % | SYSTOLIC BLOOD PRESSURE: 126 MMHG | RESPIRATION RATE: 16 BRPM | WEIGHT: 203.5 LBS

## 2019-08-29 DIAGNOSIS — G43.009 MIGRAINE WITHOUT AURA AND WITHOUT STATUS MIGRAINOSUS, NOT INTRACTABLE: ICD-10-CM

## 2019-08-29 DIAGNOSIS — F17.200 CURRENT SMOKER: ICD-10-CM

## 2019-08-29 DIAGNOSIS — K21.9 GASTROESOPHAGEAL REFLUX DISEASE WITHOUT ESOPHAGITIS: ICD-10-CM

## 2019-08-29 DIAGNOSIS — R25.2 CRAMPS, EXTREMITY: ICD-10-CM

## 2019-08-29 DIAGNOSIS — Z00.00 ENCOUNTER FOR ANNUAL HEALTH EXAMINATION: ICD-10-CM

## 2019-08-29 DIAGNOSIS — Z12.31 ENCOUNTER FOR SCREENING MAMMOGRAM FOR MALIGNANT NEOPLASM OF BREAST: ICD-10-CM

## 2019-08-29 DIAGNOSIS — Z01.419 ENCOUNTER FOR GYNECOLOGICAL EXAMINATION WITH PAPANICOLAOU SMEAR OF CERVIX: ICD-10-CM

## 2019-08-29 DIAGNOSIS — D50.9 IRON DEFICIENCY ANEMIA, UNSPECIFIED IRON DEFICIENCY ANEMIA TYPE: ICD-10-CM

## 2019-08-29 DIAGNOSIS — I10 ESSENTIAL HYPERTENSION, BENIGN: ICD-10-CM

## 2019-08-29 DIAGNOSIS — I15.2 HYPERTENSION ASSOCIATED WITH TYPE 2 DIABETES MELLITUS (HCC): ICD-10-CM

## 2019-08-29 DIAGNOSIS — E87.6 LOW BLOOD POTASSIUM: ICD-10-CM

## 2019-08-29 DIAGNOSIS — Z87.891 PERSONAL HISTORY OF TOBACCO USE, PRESENTING HAZARDS TO HEALTH: ICD-10-CM

## 2019-08-29 DIAGNOSIS — E11.59 HYPERTENSION ASSOCIATED WITH TYPE 2 DIABETES MELLITUS (HCC): ICD-10-CM

## 2019-08-29 DIAGNOSIS — H91.92 DECREASED HEARING OF LEFT EAR: ICD-10-CM

## 2019-08-29 DIAGNOSIS — Z11.59 NEED FOR HEPATITIS C SCREENING TEST: ICD-10-CM

## 2019-08-29 DIAGNOSIS — Z87.442 HISTORY OF KIDNEY STONES: ICD-10-CM

## 2019-08-29 DIAGNOSIS — M16.0 PRIMARY OSTEOARTHRITIS OF HIPS, BILATERAL: ICD-10-CM

## 2019-08-29 DIAGNOSIS — M47.896 OTHER OSTEOARTHRITIS OF SPINE, LUMBAR REGION: ICD-10-CM

## 2019-08-29 DIAGNOSIS — Z12.11 SCREEN FOR COLON CANCER: Primary | ICD-10-CM

## 2019-08-29 DIAGNOSIS — E66.01 CLASS 2 SEVERE OBESITY DUE TO EXCESS CALORIES WITH SERIOUS COMORBIDITY IN ADULT, UNSPECIFIED BMI (HCC): ICD-10-CM

## 2019-08-29 DIAGNOSIS — M17.0 PRIMARY OSTEOARTHRITIS OF BOTH KNEES: ICD-10-CM

## 2019-08-29 DIAGNOSIS — E78.00 PURE HYPERCHOLESTEROLEMIA: ICD-10-CM

## 2019-08-29 DIAGNOSIS — F41.1 ANXIETY STATE: ICD-10-CM

## 2019-08-29 DIAGNOSIS — E11.9 DIABETES MELLITUS WITHOUT COMPLICATION (HCC): ICD-10-CM

## 2019-08-29 LAB
BASOPHILS # BLD AUTO: 0.1 X10(3) UL (ref 0–0.2)
BASOPHILS NFR BLD AUTO: 1.3 %
DEPRECATED RDW RBC AUTO: 48 FL (ref 35.1–46.3)
EOSINOPHIL # BLD AUTO: 0.93 X10(3) UL (ref 0–0.7)
EOSINOPHIL NFR BLD AUTO: 11.7 %
ERYTHROCYTE [DISTWIDTH] IN BLOOD BY AUTOMATED COUNT: 14.3 % (ref 11–15)
HCT VFR BLD AUTO: 40 % (ref 35–48)
HGB BLD-MCNC: 13.2 G/DL (ref 12–16)
IMM GRANULOCYTES # BLD AUTO: 0.02 X10(3) UL (ref 0–1)
IMM GRANULOCYTES NFR BLD: 0.3 %
LYMPHOCYTES # BLD AUTO: 1.95 X10(3) UL (ref 1–4)
LYMPHOCYTES NFR BLD AUTO: 24.6 %
MCH RBC QN AUTO: 30.3 PG (ref 26–34)
MCHC RBC AUTO-ENTMCNC: 33 G/DL (ref 31–37)
MCV RBC AUTO: 91.7 FL (ref 80–100)
MONOCYTES # BLD AUTO: 0.51 X10(3) UL (ref 0.1–1)
MONOCYTES NFR BLD AUTO: 6.4 %
NEUTROPHILS # BLD AUTO: 4.42 X10 (3) UL (ref 1.5–7.7)
NEUTROPHILS # BLD AUTO: 4.42 X10(3) UL (ref 1.5–7.7)
NEUTROPHILS NFR BLD AUTO: 55.7 %
PLATELET # BLD AUTO: 243 10(3)UL (ref 150–450)
POTASSIUM SERPL-SCNC: 3.5 MMOL/L (ref 3.5–5.1)
RBC # BLD AUTO: 4.36 X10(6)UL (ref 3.8–5.3)
WBC # BLD AUTO: 7.9 X10(3) UL (ref 4–11)

## 2019-08-29 PROCEDURE — 85025 COMPLETE CBC W/AUTO DIFF WBC: CPT | Performed by: FAMILY MEDICINE

## 2019-08-29 PROCEDURE — 84132 ASSAY OF SERUM POTASSIUM: CPT

## 2019-08-29 PROCEDURE — G0402 INITIAL PREVENTIVE EXAM: HCPCS | Performed by: FAMILY MEDICINE

## 2019-08-29 PROCEDURE — 36415 COLL VENOUS BLD VENIPUNCTURE: CPT | Performed by: FAMILY MEDICINE

## 2019-08-29 PROCEDURE — 99214 OFFICE O/P EST MOD 30 MIN: CPT | Performed by: FAMILY MEDICINE

## 2019-08-29 PROCEDURE — 88175 CYTOPATH C/V AUTO FLUID REDO: CPT | Performed by: FAMILY MEDICINE

## 2019-08-29 PROCEDURE — 99406 BEHAV CHNG SMOKING 3-10 MIN: CPT | Performed by: FAMILY MEDICINE

## 2019-08-29 RX ORDER — GLIMEPIRIDE 4 MG/1
4 TABLET ORAL
Qty: 90 TABLET | Refills: 0 | Status: SHIPPED | OUTPATIENT
Start: 2019-08-29 | End: 2020-05-25

## 2019-08-29 RX ORDER — BUTALBITAL, ACETAMINOPHEN AND CAFFEINE 50; 325; 40 MG/1; MG/1; MG/1
1 TABLET ORAL EVERY 6 HOURS PRN
Qty: 60 TABLET | Refills: 0 | Status: SHIPPED | OUTPATIENT
Start: 2019-08-29 | End: 2019-09-12 | Stop reason: ALTCHOICE

## 2019-08-29 RX ORDER — LOSARTAN POTASSIUM AND HYDROCHLOROTHIAZIDE 25; 100 MG/1; MG/1
TABLET ORAL
Qty: 90 TABLET | Refills: 0 | Status: SHIPPED | OUTPATIENT
Start: 2019-08-29 | End: 2019-12-11 | Stop reason: RX

## 2019-08-29 RX ORDER — PAROXETINE HYDROCHLORIDE 40 MG/1
TABLET, FILM COATED ORAL
Qty: 90 TABLET | Refills: 0 | Status: SHIPPED | OUTPATIENT
Start: 2019-08-29 | End: 2019-12-05

## 2019-08-29 NOTE — PATIENT INSTRUCTIONS
Haresh Berkowitz's SCREENING SCHEDULE   Tests on this list are recommended by your physician but may not be covered, or covered at this frequency, by your insurer. Please check with your insurance carrier before scheduling to verify coverage.    PREVENTATI Screen   Covered every 10 years- more often if abnormal Colonoscopy due on 09/23/2013 Update [facility] if applicable    Flex Sigmoidoscopy Screen  Covered every 5 years No results found for this or any previous visit. No flowsheet data found. (Pneumovax)  Covered Once after 65 No orders found for this or any previous visit. Please get once after your 65th birthday    Hepatitis B for Moderate/High Risk       No orders found for this or any previous visit.  Medium/high risk factors:   End-stage re

## 2019-08-29 NOTE — PROGRESS NOTES
HPI:   Alonso Shen is a 52year old female who presents for a Medicare Initial Annual Wellness visit (Once after 12 month Medicare anniversary) .   Regular SBE- yes,Sexually active- no,  Contraception- menopausal. STD history- none    Screening:    Immu medications. Pt here to f/u on the migraines/headaches. Pt is on the following Medication for the headaches: Tylenol but would like to try the Fioricet again since these headaches are more tension related. .   Pt's frequency of headaches several days a w CHOLESTROL (mg/dL)   Date Value   09/19/2013 76     AST (U/L)   Date Value   05/01/2019 17   09/05/2014 29   09/19/2013 29   03/05/2013 14   12/28/2011 19     ALT (U/L)   Date Value   05/01/2019 12   09/05/2014 32   09/19/2013 26   03/05/2013 11   12/28/20 activities? : Yes          Depression Screening (PHQ-2/PHQ-9): Over the LAST 2 WEEKS   Little interest or pleasure in doing things (over the last two weeks)?: Not at all  Feeling down, depressed, or hopeless (over the last two weeks)?: Not at all  PHQ-2 SC Osteoarthritis of lumbar spine     History of kidney stones     Hypertension associated with type 2 diabetes mellitus ([address]    Wt Readings from Last 3 Encounters:  08/29/19 : 203 lb 8 oz  06/12/19 : 204 lb  03/14/19 : 214 lb     Last Cholesterol Labs:   La calculi, Rheumatoid arthritis ([facility] [address] and Uterus, adenomyosis (2016). She  has a past surgical history that includes other surgical history; d & c; femur/knee surg unlisted; tubal ligation; and  ().     Her family history includes Arthri and gums normal   Neck: Supple, symmetrical, trachea midline, no adenopathy;  thyroid: not enlarged, symmetric, no tenderness/mass/nodules; no carotid bruit or JVD   Back:   Symmetric, no curvature, ROM normal, no CVA tenderness   Lungs:   Clear to auscult MORNING    Essential hypertension, benign  -     Losartan Potassium-HCTZ 100-25 MG Oral Tab; TAKE 1 TABLET BY MOUTH ONE TIME A DAY    Encounter for gynecological examination with Papanicolaou smear of cervix  -     THINPREP PAP SMEAR B; Future         Diet DIFFERENTIAL    8. Decreased hearing of left ear    - ENT - INTERNAL    9. Gastroesophageal reflux disease without esophagitis  - Pt has sx's C/W a diagnosis of GERD. Pt needs to lose wgt to improve symptoms.  Also patient should avoid eating or drinking tw with type 2 diabetes mellitus (HCC)  - stable, continue medication. The patient indicates understanding of these issues and agrees to the plan. Reinforced healthy diet, lifestyle, and exercise. Prescription medication ordered.   Imaging studies ordered previous visit. No flowsheet data found.     Pap and Pelvic      Pap: Every 3 yrs age 21-65 or Pap+HPV every 5 yrs age 33-67, age 72 and older at high risk Pap Smear,3 Years due on 06/27/2019 Update Health Maintenance if applicable    Chlamydia  Annually if for: DIGOXIN, DIG, VALP No flowsheet data found. Diabetes      HgbA1C  Annually HEMOGLOBIN A1c (% of total Hgb)   Date Value   05/01/2019 6.0 (H)       No flowsheet data found.     Creat/alb ratio  Annually Malb/Cre Calc (ug/mg)   Date Value   08/09/2

## 2019-09-01 DIAGNOSIS — F41.1 ANXIETY STATE: ICD-10-CM

## 2019-09-03 RX ORDER — PAROXETINE HYDROCHLORIDE 40 MG/1
TABLET, FILM COATED ORAL
Qty: 30 TABLET | Refills: 0 | OUTPATIENT
Start: 2019-09-03

## 2019-09-05 DIAGNOSIS — F41.1 ANXIETY STATE: ICD-10-CM

## 2019-09-05 RX ORDER — PAROXETINE HYDROCHLORIDE 40 MG/1
TABLET, FILM COATED ORAL
Qty: 30 TABLET | Refills: 0 | OUTPATIENT
Start: 2019-09-05

## 2019-09-05 NOTE — TELEPHONE ENCOUNTER
Already filled qty 90    Paroxetine HCl 40 Mg  Last OV relevant to medication: 8-29-19  Last refill date: 8-29-19  #/refills: 0  When pt was asked to return for OV: 3-6 mo.   Upcoming appt/reason: none  Recent labs: none

## 2019-09-11 DIAGNOSIS — F41.1 ANXIETY STATE: ICD-10-CM

## 2019-09-11 RX ORDER — ALPRAZOLAM 0.5 MG/1
TABLET ORAL
Qty: 40 TABLET | Refills: 0 | Status: SHIPPED | OUTPATIENT
Start: 2019-09-11 | End: 2019-10-10

## 2019-09-11 NOTE — TELEPHONE ENCOUNTER
Alprazolam 0.5 MG Oral Tab    Last OV relevant to medication: 8/29/2019    Last refill date: 8/12/2019     #/refills: #40 w/ 0 refills     When pt was asked to return for OV: 6 months     Upcoming appt/reason: No future appointments

## 2019-09-12 ENCOUNTER — OFFICE VISIT (OUTPATIENT)
Dept: RHEUMATOLOGY | Facility: CLINIC | Age: 54
End: 2019-09-12
Payer: MEDICARE

## 2019-09-12 VITALS
SYSTOLIC BLOOD PRESSURE: 110 MMHG | RESPIRATION RATE: 20 BRPM | DIASTOLIC BLOOD PRESSURE: 66 MMHG | HEIGHT: 64.5 IN | WEIGHT: 197 LBS | BODY MASS INDEX: 33.22 KG/M2 | HEART RATE: 88 BPM

## 2019-09-12 DIAGNOSIS — M17.0 PRIMARY OSTEOARTHRITIS OF BOTH KNEES: ICD-10-CM

## 2019-09-12 DIAGNOSIS — M47.896 OTHER OSTEOARTHRITIS OF SPINE, LUMBAR REGION: ICD-10-CM

## 2019-09-12 DIAGNOSIS — M16.0 PRIMARY OSTEOARTHRITIS OF HIPS, BILATERAL: Primary | ICD-10-CM

## 2019-09-12 PROCEDURE — 99213 OFFICE O/P EST LOW 20 MIN: CPT | Performed by: INTERNAL MEDICINE

## 2019-09-12 RX ORDER — HYDROCODONE BITARTRATE AND ACETAMINOPHEN 10; 325 MG/1; MG/1
1 TABLET ORAL EVERY 4 HOURS PRN
Qty: 150 TABLET | Refills: 0 | Status: SHIPPED | OUTPATIENT
Start: 2019-09-12 | End: 2019-10-12

## 2019-09-12 RX ORDER — HYDROCODONE BITARTRATE AND ACETAMINOPHEN 10; 325 MG/1; MG/1
1 TABLET ORAL EVERY 4 HOURS PRN
Qty: 150 TABLET | Refills: 0 | Status: SHIPPED | OUTPATIENT
Start: 2019-10-12 | End: 2019-11-11

## 2019-09-12 RX ORDER — CYCLOBENZAPRINE HCL 5 MG
5 TABLET ORAL 2 TIMES DAILY PRN
Qty: 60 TABLET | Refills: 2 | Status: CANCELLED | OUTPATIENT
Start: 2019-09-12

## 2019-09-12 RX ORDER — HYDROCODONE BITARTRATE AND ACETAMINOPHEN 10; 325 MG/1; MG/1
1 TABLET ORAL EVERY 6 HOURS PRN
Qty: 150 TABLET | Refills: 0 | Status: SHIPPED | OUTPATIENT
Start: 2019-11-11 | End: 2019-11-11

## 2019-09-12 NOTE — PATIENT INSTRUCTIONS
Use Norco for pain 10 mg every 4-6 hours for pain. Use Cyclobenzaprine as a muscle relaxant at night. Another option for left hip replacement is Herlinda Keenan an new orthopedic surgeon at [facility]. Return to office in 3 months.

## 2019-09-12 NOTE — PROGRESS NOTES
[facility]  Dr. Solo Cisse Progress Note     Subjective:   Trisha Phan is a(n) 52year old female. Current complaints: Patient presents with:  Osteoarthritis: hips, knees and back now painful, Pain contract up-to-date,   Not feeling good.   Back brandy

## 2019-10-05 DIAGNOSIS — E11.9 DIABETES MELLITUS WITHOUT COMPLICATION (HCC): ICD-10-CM

## 2019-10-05 DIAGNOSIS — E78.00 PURE HYPERCHOLESTEROLEMIA: ICD-10-CM

## 2019-10-07 RX ORDER — ATORVASTATIN CALCIUM 20 MG/1
TABLET, FILM COATED ORAL
Qty: 30 TABLET | Refills: 2 | Status: SHIPPED | OUTPATIENT
Start: 2019-10-07 | End: 2020-02-17

## 2019-10-10 DIAGNOSIS — F41.1 ANXIETY STATE: ICD-10-CM

## 2019-10-11 RX ORDER — ALPRAZOLAM 0.5 MG/1
TABLET ORAL
Qty: 40 TABLET | Refills: 0 | Status: SHIPPED | OUTPATIENT
Start: 2019-10-11 | End: 2019-11-05

## 2019-10-11 NOTE — TELEPHONE ENCOUNTER
No protocol    Requesting ALPRAZOLAM 0.5 MG Oral Tab  LOV: 8/29/29  RTC: 6 months  Last Relevant Labs: 5/1/19  Filled: 9/11/19 #40 with 0 refills    Future Appointments   Date Time Provider Larisa Diallo   34/79/6903  0:92 PM Damián Mccurdy MD [facility]

## 2019-10-16 ENCOUNTER — TELEPHONE (OUTPATIENT)
Dept: INTERNAL MEDICINE CLINIC | Facility: CLINIC | Age: 54
End: 2019-10-16

## 2019-10-16 NOTE — TELEPHONE ENCOUNTER
[facility]    10/12/19 Lot number

## 2019-11-05 DIAGNOSIS — F41.1 ANXIETY STATE: ICD-10-CM

## 2019-11-05 RX ORDER — ALPRAZOLAM 0.5 MG/1
TABLET ORAL
Qty: 40 TABLET | Refills: 0 | Status: SHIPPED | OUTPATIENT
Start: 2019-11-05 | End: 2019-12-05

## 2019-11-05 NOTE — TELEPHONE ENCOUNTER
Alprazolam 0.5 mg Oral Tab    Last OV relevant to medication: 8/29/2019  Last refill date: 10/11/2019     #/refills: #40 w/ 0 refills   When pt was asked to return for OV: 6 months   Upcoming appt/reason: no future appointments

## 2019-11-06 RX ORDER — CYCLOBENZAPRINE HCL 5 MG
5 TABLET ORAL 2 TIMES DAILY PRN
Qty: 60 TABLET | Refills: 0 | Status: SHIPPED | OUTPATIENT
Start: 2019-11-06 | End: 2020-01-01

## 2019-11-08 ENCOUNTER — TELEPHONE (OUTPATIENT)
Dept: INTERNAL MEDICINE CLINIC | Facility: CLINIC | Age: 54
End: 2019-11-08

## 2019-11-08 NOTE — TELEPHONE ENCOUNTER
Patient would like to know if she needs the pneumonia vaccine? Please review and message back/mychart to let her know.

## 2019-11-11 RX ORDER — HYDROCODONE BITARTRATE AND ACETAMINOPHEN 10; 325 MG/1; MG/1
1 TABLET ORAL EVERY 4 HOURS PRN
Qty: 30 TABLET | Refills: 0 | Status: SHIPPED | OUTPATIENT
Start: 2019-12-02 | End: 2020-01-01

## 2019-11-11 NOTE — TELEPHONE ENCOUNTER
Patient would like to know if she can get a tetanus shot as well as her pneumonia? Is she due?  Please advise

## 2019-12-05 DIAGNOSIS — F41.1 ANXIETY STATE: ICD-10-CM

## 2019-12-05 RX ORDER — PAROXETINE HYDROCHLORIDE 40 MG/1
TABLET, FILM COATED ORAL
Qty: 90 TABLET | Refills: 0 | Status: SHIPPED | OUTPATIENT
Start: 2019-12-05 | End: 2020-02-25

## 2019-12-05 NOTE — TELEPHONE ENCOUNTER
Last OV: 8/29/19 with Ashleigh Aldana NP  Last refill date: 8/29/19     #/refills: #90, 0 refills  When pt was asked to return for OV: 3-6 months  Upcoming appt/reason: no upcoming appt  Last labs 8/29/19

## 2019-12-06 ENCOUNTER — TELEPHONE (OUTPATIENT)
Dept: INTERNAL MEDICINE CLINIC | Facility: CLINIC | Age: 54
End: 2019-12-06

## 2019-12-06 DIAGNOSIS — E11.9 DIABETES MELLITUS WITHOUT COMPLICATION (HCC): ICD-10-CM

## 2019-12-06 RX ORDER — ALPRAZOLAM 0.5 MG/1
TABLET ORAL
Qty: 40 TABLET | Refills: 0 | Status: SHIPPED | OUTPATIENT
Start: 2019-12-06 | End: 2020-01-01

## 2019-12-06 NOTE — TELEPHONE ENCOUNTER
Pharmacy would like medication to be sent septately, combination is on back order  Losartan Potassium-HCTZ 100-25 MG

## 2019-12-06 NOTE — TELEPHONE ENCOUNTER
Failed protocol     Last refill:  8/29/2019 Glimepiride 4 mg #90 NR    A1C - 5/1/2019    LOV:   8/29/2019 Alina Rutledge RTC 3-6 months    No FOV scheduled -Due by FEB 2020

## 2019-12-08 RX ORDER — GLIMEPIRIDE 4 MG/1
TABLET ORAL
Qty: 90 TABLET | Refills: 0 | OUTPATIENT
Start: 2019-12-08

## 2019-12-10 NOTE — TELEPHONE ENCOUNTER
Sent [facility]formerly [facility] Per chinmay, Pt is over due for lab work. All labs were ordered from back in May and August. Pt needs to fast for labs.

## 2019-12-11 ENCOUNTER — OFFICE VISIT (OUTPATIENT)
Dept: RHEUMATOLOGY | Facility: CLINIC | Age: 54
End: 2019-12-11
Payer: MEDICARE

## 2019-12-11 VITALS
BODY MASS INDEX: 33.22 KG/M2 | DIASTOLIC BLOOD PRESSURE: 68 MMHG | RESPIRATION RATE: 20 BRPM | SYSTOLIC BLOOD PRESSURE: 118 MMHG | WEIGHT: 197 LBS | HEIGHT: 64.5 IN | HEART RATE: 118 BPM

## 2019-12-11 DIAGNOSIS — M17.0 PRIMARY OSTEOARTHRITIS OF BOTH KNEES: ICD-10-CM

## 2019-12-11 DIAGNOSIS — M16.0 PRIMARY OSTEOARTHRITIS OF HIPS, BILATERAL: Primary | ICD-10-CM

## 2019-12-11 DIAGNOSIS — M47.896 OTHER OSTEOARTHRITIS OF SPINE, LUMBAR REGION: ICD-10-CM

## 2019-12-11 PROCEDURE — 99213 OFFICE O/P EST LOW 20 MIN: CPT | Performed by: INTERNAL MEDICINE

## 2019-12-11 RX ORDER — HYDROCODONE BITARTRATE AND ACETAMINOPHEN 10; 325 MG/1; MG/1
TABLET ORAL
Qty: 150 TABLET | Refills: 0 | Status: SHIPPED | OUTPATIENT
Start: 2020-01-10 | End: 2020-02-25

## 2019-12-11 RX ORDER — HYDROCODONE BITARTRATE AND ACETAMINOPHEN 10; 325 MG/1; MG/1
TABLET ORAL
Qty: 150 TABLET | Refills: 0 | Status: SHIPPED | OUTPATIENT
Start: 2019-12-11 | End: 2020-02-25

## 2019-12-11 RX ORDER — CYCLOBENZAPRINE HCL 5 MG
5 TABLET ORAL 2 TIMES DAILY PRN
Qty: 60 TABLET | Refills: 0 | Status: CANCELLED | OUTPATIENT
Start: 2019-12-11

## 2019-12-11 RX ORDER — LOSARTAN POTASSIUM 100 MG/1
100 TABLET ORAL DAILY
Qty: 90 TABLET | Refills: 0 | Status: SHIPPED | OUTPATIENT
Start: 2019-12-11 | End: 2020-05-25

## 2019-12-11 RX ORDER — HYDROCHLOROTHIAZIDE 25 MG/1
25 TABLET ORAL DAILY
Qty: 90 TABLET | Refills: 0 | Status: SHIPPED | OUTPATIENT
Start: 2019-12-11 | End: 2020-05-25

## 2019-12-11 RX ORDER — HYDROCODONE BITARTRATE AND ACETAMINOPHEN 10; 325 MG/1; MG/1
TABLET ORAL
Qty: 150 TABLET | Refills: 0 | Status: SHIPPED | OUTPATIENT
Start: 2020-02-09 | End: 2021-03-29

## 2019-12-11 NOTE — PROGRESS NOTES
[facility]  Dr. Augustina Recinos Progress Note     Subjective:   Bibi Stevens is a(n) 62year old female.    Current complaints: Patient presents with:  Osteoarthritis: Bilateral hip replacement, unable to get hip replacement surgery due to cost and husbands

## 2019-12-11 NOTE — PATIENT INSTRUCTIONS
For orthopedic surgeon Dr Mark Weller at [address] [place] [address]. For pain use Norco 10 mg one every 4-5 hours up to 5 per day. Return to office 3 months.

## 2019-12-29 DIAGNOSIS — F41.1 ANXIETY STATE: ICD-10-CM

## 2019-12-30 RX ORDER — ALPRAZOLAM 0.5 MG/1
TABLET ORAL
Qty: 40 TABLET | Refills: 0 | OUTPATIENT
Start: 2019-12-30

## 2019-12-30 NOTE — TELEPHONE ENCOUNTER
Alprazolam 0.5 MG  Last OV relevant to medication: 8-29-19  Last refill date: 12-6-19  #/refills: 0  When pt was asked to return for OV: 3-6 mo.   Upcoming appt/reason: none  Recent labs: none

## 2020-01-01 DIAGNOSIS — F41.1 ANXIETY STATE: ICD-10-CM

## 2020-01-02 RX ORDER — CYCLOBENZAPRINE HCL 5 MG
5 TABLET ORAL 2 TIMES DAILY PRN
Qty: 60 TABLET | Refills: 0 | Status: SHIPPED | OUTPATIENT
Start: 2020-01-02 | End: 2020-06-11

## 2020-01-02 RX ORDER — ALPRAZOLAM 0.5 MG/1
TABLET ORAL
Qty: 40 TABLET | Refills: 0 | Status: SHIPPED | OUTPATIENT
Start: 2020-01-02 | End: 2020-01-28

## 2020-01-02 RX ORDER — ALPRAZOLAM 0.5 MG/1
TABLET ORAL
Qty: 40 TABLET | Refills: 0 | OUTPATIENT
Start: 2020-01-02

## 2020-01-09 ENCOUNTER — TELEPHONE (OUTPATIENT)
Dept: RHEUMATOLOGY | Facility: CLINIC | Age: 55
End: 2020-01-09

## 2020-01-09 NOTE — TELEPHONE ENCOUNTER
Spoke with [place] and patient. Donel Nett will be released on 1/10/2020 per [place] [facility]  guidelines.

## 2020-01-24 DIAGNOSIS — F41.1 ANXIETY STATE: ICD-10-CM

## 2020-01-25 RX ORDER — ALPRAZOLAM 0.5 MG/1
TABLET ORAL
Qty: 40 TABLET | Refills: 0 | OUTPATIENT
Start: 2020-01-25

## 2020-01-25 NOTE — TELEPHONE ENCOUNTER
Alprazolam 0.5 Mg  Last OV relevant to medication: 8-29-19  Last refill date: 1-2-2020  #/refills: 0  When pt was asked to return for OV: 3-6 mo.   Upcoming appt/reason: none  Recent labs: none

## 2020-01-28 DIAGNOSIS — F41.1 ANXIETY STATE: ICD-10-CM

## 2020-01-29 RX ORDER — ALPRAZOLAM 0.5 MG/1
TABLET ORAL
Qty: 40 TABLET | Refills: 0 | Status: SHIPPED | OUTPATIENT
Start: 2020-01-29 | End: 2020-03-02

## 2020-02-13 DIAGNOSIS — E78.00 PURE HYPERCHOLESTEROLEMIA: ICD-10-CM

## 2020-02-13 DIAGNOSIS — E11.9 DIABETES MELLITUS WITHOUT COMPLICATION (HCC): ICD-10-CM

## 2020-02-13 DIAGNOSIS — F41.1 ANXIETY STATE: ICD-10-CM

## 2020-02-14 NOTE — TELEPHONE ENCOUNTER
Paroxetine HCl 40 mg  Last OV relevant to medication: 8-29-19  Last refill date: 12-5-19 #/refills: 0  When pt was asked to return for OV: 3-6 mo.   Upcoming appt/reason: none  Recent labs: none

## 2020-02-16 RX ORDER — PAROXETINE HYDROCHLORIDE 40 MG/1
TABLET, FILM COATED ORAL
Qty: 90 TABLET | Refills: 0 | OUTPATIENT
Start: 2020-02-16

## 2020-02-17 RX ORDER — ATORVASTATIN CALCIUM 20 MG/1
TABLET, FILM COATED ORAL
Qty: 30 TABLET | Refills: 0 | Status: SHIPPED | OUTPATIENT
Start: 2020-02-17 | End: 2020-03-18

## 2020-02-17 NOTE — TELEPHONE ENCOUNTER
METFORMIN HCL 1000 MG--failed ATORVASTATIN 20 MG --passed    Last OV relevant to medication: 8--    Last refill date:  Metformin--10-7-2019 #60 tabs with 2 refills  Atorvastatin- 10-7-2019 #30 tabs with 2 refills    When pt was asked to return for

## 2020-02-17 NOTE — TELEPHONE ENCOUNTER
Ludi labs message sent to patient to schedule OV     If patient calls please schedule appointment for an OV.

## 2020-02-25 ENCOUNTER — OFFICE VISIT (OUTPATIENT)
Dept: INTERNAL MEDICINE CLINIC | Facility: CLINIC | Age: 55
End: 2020-02-25
Payer: MEDICARE

## 2020-02-25 ENCOUNTER — HOSPITAL ENCOUNTER (OUTPATIENT)
Dept: MAMMOGRAPHY | Age: 55
Discharge: HOME | End: 2020-02-25
Attending: FAMILY MEDICINE
Payer: MEDICARE

## 2020-02-25 VITALS
SYSTOLIC BLOOD PRESSURE: 124 MMHG | RESPIRATION RATE: 18 BRPM | HEART RATE: 118 BPM | TEMPERATURE: 98 F | OXYGEN SATURATION: 95 % | HEIGHT: 64.5 IN | WEIGHT: 209.5 LBS | BODY MASS INDEX: 35.33 KG/M2 | DIASTOLIC BLOOD PRESSURE: 76 MMHG

## 2020-02-25 DIAGNOSIS — Z12.31 ENCOUNTER FOR SCREENING MAMMOGRAM FOR MALIGNANT NEOPLASM OF BREAST: ICD-10-CM

## 2020-02-25 DIAGNOSIS — E11.9 TYPE 2 DIABETES MELLITUS WITHOUT COMPLICATION, WITHOUT LONG-TERM CURRENT USE OF INSULIN (HCC): ICD-10-CM

## 2020-02-25 DIAGNOSIS — E11.59 HYPERTENSION ASSOCIATED WITH TYPE 2 DIABETES MELLITUS (HCC): Primary | ICD-10-CM

## 2020-02-25 DIAGNOSIS — F41.1 ANXIETY STATE: ICD-10-CM

## 2020-02-25 DIAGNOSIS — H60.502 ACUTE OTITIS EXTERNA OF LEFT EAR, UNSPECIFIED TYPE: ICD-10-CM

## 2020-02-25 DIAGNOSIS — I15.2 HYPERTENSION ASSOCIATED WITH TYPE 2 DIABETES MELLITUS (HCC): Primary | ICD-10-CM

## 2020-02-25 DIAGNOSIS — K21.9 GASTROESOPHAGEAL REFLUX DISEASE, ESOPHAGITIS PRESENCE NOT SPECIFIED: ICD-10-CM

## 2020-02-25 PROCEDURE — 99214 OFFICE O/P EST MOD 30 MIN: CPT | Performed by: FAMILY MEDICINE

## 2020-02-25 PROCEDURE — 77067 SCR MAMMO BI INCL CAD: CPT | Performed by: FAMILY MEDICINE

## 2020-02-25 PROCEDURE — 77063 BREAST TOMOSYNTHESIS BI: CPT | Performed by: FAMILY MEDICINE

## 2020-02-25 RX ORDER — PAROXETINE HYDROCHLORIDE 40 MG/1
TABLET, FILM COATED ORAL
Qty: 90 TABLET | Refills: 0 | Status: SHIPPED | OUTPATIENT
Start: 2020-02-25 | End: 2020-05-25

## 2020-02-25 RX ORDER — NEOMYCIN SULFATE, POLYMYXIN B SULFATE AND HYDROCORTISONE 10; 3.5; 1 MG/ML; MG/ML; [USP'U]/ML
4 SUSPENSION/ DROPS AURICULAR (OTIC) 4 TIMES DAILY
Qty: 1 BOTTLE | Refills: 0 | Status: SHIPPED | OUTPATIENT
Start: 2020-02-25 | End: 2020-03-03

## 2020-02-25 NOTE — PROGRESS NOTES
HPI:   Bette Persaud is a 62year old female who presents for recheck of her diabetes. Patient’s FBS have been between . Pt tests his blood  times daily. Last visit with ophthalmologist was 1 year ago- 2/2019.    Pt has been checking her feet on (89.4 kg)  09/12/19 : 197 lb (89.4 kg)  08/29/19 : 203 lb 8 oz (92.3 kg)  06/12/19 : 204 lb (92.5 kg)  03/14/19 : 214 lb (97.1 kg)    Body mass index is 35.41 kg/m².      HEMOGLOBIN A1c (% of total Hgb)   Date Value   05/01/2019 6.0 (H)   03/05/2013 5.7 (H) cyclobenzaprine 5 MG Oral Tab Take 1 tablet (5 mg total) by mouth 2 (two) times daily as needed for Muscle spasms. 60 tablet 0   • HYDROcodone-acetaminophen  MG Oral Tab Take 1 tab every 4-6 hrs for pain as needed. Up to 5 per day.  150 tablet 0   • g swelling. Gastrointestinal: see HPI. Negative for nausea, diarrhea and constipation. Endocrine: Negative for polydipsia, polyphagia and polyuria. Genitourinary: Negative for dysuria. Musculoskeletal: Negative for joint pain.    Skin: Negative for ra ([address]  Conservative measures dicussed. Continue medication. Diet and exercise explained and encouraged. Fasting labs due. Home blood pressure monitoring. Pt should measure BP’s two to three times per week. Goal blood pressure at home - < 135/85.        3

## 2020-03-02 DIAGNOSIS — F41.1 ANXIETY STATE: ICD-10-CM

## 2020-03-03 RX ORDER — ALPRAZOLAM 0.5 MG/1
TABLET ORAL
Qty: 40 TABLET | Refills: 0 | Status: SHIPPED | OUTPATIENT
Start: 2020-03-03 | End: 2020-04-01

## 2020-03-03 NOTE — TELEPHONE ENCOUNTER
Alprazolam 0.5 mg  Last OV relevant to medication: 2-  Last refill date: 1- #/refills: 0  When pt was asked to return for OV: 6 mo.   Upcoming appt/reason: none  Recent labs: none

## 2020-03-11 ENCOUNTER — OFFICE VISIT (OUTPATIENT)
Dept: RHEUMATOLOGY | Facility: CLINIC | Age: 55
End: 2020-03-11
Payer: MEDICARE

## 2020-03-11 VITALS
DIASTOLIC BLOOD PRESSURE: 78 MMHG | HEART RATE: 88 BPM | SYSTOLIC BLOOD PRESSURE: 124 MMHG | HEIGHT: 64.5 IN | BODY MASS INDEX: 35.25 KG/M2 | RESPIRATION RATE: 18 BRPM | WEIGHT: 209 LBS

## 2020-03-11 DIAGNOSIS — M16.0 PRIMARY OSTEOARTHRITIS OF HIPS, BILATERAL: ICD-10-CM

## 2020-03-11 DIAGNOSIS — M47.896 OTHER OSTEOARTHRITIS OF SPINE, LUMBAR REGION: Primary | ICD-10-CM

## 2020-03-11 DIAGNOSIS — M17.0 PRIMARY OSTEOARTHRITIS OF BOTH KNEES: ICD-10-CM

## 2020-03-11 PROCEDURE — 99213 OFFICE O/P EST LOW 20 MIN: CPT | Performed by: INTERNAL MEDICINE

## 2020-03-11 RX ORDER — HYDROCODONE BITARTRATE AND ACETAMINOPHEN 10; 325 MG/1; MG/1
1 TABLET ORAL
Qty: 150 TABLET | Refills: 0 | Status: SHIPPED | OUTPATIENT
Start: 2020-05-10 | End: 2020-06-09

## 2020-03-11 RX ORDER — HYDROCODONE BITARTRATE AND ACETAMINOPHEN 10; 325 MG/1; MG/1
1 TABLET ORAL
Qty: 150 TABLET | Refills: 0 | Status: SHIPPED | OUTPATIENT
Start: 2020-04-10 | End: 2020-05-10

## 2020-03-11 RX ORDER — HYDROCODONE BITARTRATE AND ACETAMINOPHEN 10; 325 MG/1; MG/1
1 TABLET ORAL
Qty: 150 TABLET | Refills: 0 | Status: SHIPPED | OUTPATIENT
Start: 2020-03-11 | End: 2020-04-10

## 2020-03-11 NOTE — PROGRESS NOTES
[facility]  Dr. Kasey Hudson Progress Note     Subjective:   Brody Mata is a(n) 62year old female. Current complaints: Patient presents with:  Osteoarthritis: Walking with cane, pain to hips, knees and back  Saw Dr Miryam Vasquez.   Hips have advanced arth

## 2020-03-11 NOTE — PATIENT INSTRUCTIONS
Current plan is to proceed with left total hip replacement April 8 with Dr. Chip Irby of [facility] medical [facility]. X-rays reveal bilateral hip arthritis and bilateral knee arthritis.   Continue usual Norco for pain 10 mg 1 every 4-6 hours up to 5 a day as neede

## 2020-03-16 ENCOUNTER — PATIENT MESSAGE (OUTPATIENT)
Dept: INTERNAL MEDICINE CLINIC | Facility: CLINIC | Age: 55
End: 2020-03-16

## 2020-03-16 ENCOUNTER — TELEPHONE (OUTPATIENT)
Dept: INTERNAL MEDICINE CLINIC | Facility: CLINIC | Age: 55
End: 2020-03-16

## 2020-03-16 NOTE — TELEPHONE ENCOUNTER
Pt will need EKG, CBC, PT/PTT, UA with Reflex, & CMP. Undergoing left total hip replacement @ [facility] in [address] [place] [address]. Dr. Frankie Escudero will perform DOS 4-8-20.

## 2020-03-16 NOTE — TELEPHONE ENCOUNTER
Lm for pt to return call. Pt has an appointment tomorrow for Pre-op. Per Taylor Troy, we are canceling all CPX/ Pre ops/ Sick visits.     Pt has DOS 4-8-2020, Pt needs to call Dr. Anaid Valdez and see if they are still going to be doing surgery with everything wi

## 2020-03-16 NOTE — TELEPHONE ENCOUNTER
Patient calling she is having difficult time reaching offices regarding this surgery scheduled; advised we are cancelling pre op and to wait to hear what Dr Faisal Fritz is doing about hip Sx.   Patient understood and will call back

## 2020-03-16 NOTE — TELEPHONE ENCOUNTER
From: Roderick Berkowitz  To: HEAVENLY Ware  Sent: 3/16/2020 2:27 PM CDT  Subject: Other    Do you know when I can reschedule my pre-op appointment. What test(s) will I need for my hip replacement surgery? My surgery is scheduled for April 8th.  So far it

## 2020-03-17 DIAGNOSIS — E11.9 DIABETES MELLITUS WITHOUT COMPLICATION (HCC): ICD-10-CM

## 2020-03-17 DIAGNOSIS — E78.00 PURE HYPERCHOLESTEROLEMIA: ICD-10-CM

## 2020-03-18 RX ORDER — ATORVASTATIN CALCIUM 20 MG/1
TABLET, FILM COATED ORAL
Qty: 30 TABLET | Refills: 0 | Status: SHIPPED | OUTPATIENT
Start: 2020-03-18 | End: 2020-05-25

## 2020-04-01 DIAGNOSIS — F41.1 ANXIETY STATE: ICD-10-CM

## 2020-04-02 RX ORDER — ALPRAZOLAM 0.5 MG/1
TABLET ORAL
Qty: 40 TABLET | Refills: 0 | Status: SHIPPED | OUTPATIENT
Start: 2020-04-02 | End: 2020-05-01

## 2020-04-02 NOTE — TELEPHONE ENCOUNTER
Alprazolam 0.5 mg  Last OV relevant to medication: 2-25-20  Last refill date: 3-3-20 #/refills: 0  When pt was asked to return for OV: 6 mo.   Upcoming appt/reason: none  Recent labs: none

## 2020-05-01 DIAGNOSIS — F41.1 ANXIETY STATE: ICD-10-CM

## 2020-05-04 ENCOUNTER — PATIENT MESSAGE (OUTPATIENT)
Dept: INTERNAL MEDICINE CLINIC | Facility: CLINIC | Age: 55
End: 2020-05-04

## 2020-05-04 RX ORDER — ALPRAZOLAM 0.5 MG/1
TABLET ORAL
Qty: 40 TABLET | Refills: 0 | Status: SHIPPED | OUTPATIENT
Start: 2020-05-04 | End: 2020-08-10

## 2020-05-04 NOTE — TELEPHONE ENCOUNTER
Future Appointments   Date Time Provider Larisa Imani   5/6/2020  1:00 PM Javan Segura MD [facility] [place]

## 2020-05-04 NOTE — TELEPHONE ENCOUNTER
From: Michaela Berkowitz  To:  Manuel Olguin MD  Sent: 5/4/2020 12:03 PM CDT  Subject: Visit Follow-up Question    Dear Dr. Nandini Odom:    I received a phone call this a.m. (5-4-20) from one of your nurses stating that I could have a phone visit with you this week

## 2020-05-06 ENCOUNTER — VIRTUAL PHONE E/M (OUTPATIENT)
Dept: INTERNAL MEDICINE CLINIC | Facility: CLINIC | Age: 55
End: 2020-05-06
Payer: MEDICARE

## 2020-05-06 DIAGNOSIS — E11.9 TYPE 2 DIABETES MELLITUS WITHOUT COMPLICATION, WITHOUT LONG-TERM CURRENT USE OF INSULIN (HCC): ICD-10-CM

## 2020-05-06 DIAGNOSIS — F41.1 ANXIETY STATE: ICD-10-CM

## 2020-05-06 DIAGNOSIS — E11.59 HYPERTENSION ASSOCIATED WITH TYPE 2 DIABETES MELLITUS (HCC): Primary | ICD-10-CM

## 2020-05-06 DIAGNOSIS — M16.0 PRIMARY OSTEOARTHRITIS OF HIPS, BILATERAL: ICD-10-CM

## 2020-05-06 DIAGNOSIS — I15.2 HYPERTENSION ASSOCIATED WITH TYPE 2 DIABETES MELLITUS (HCC): Primary | ICD-10-CM

## 2020-05-06 DIAGNOSIS — E78.00 PURE HYPERCHOLESTEROLEMIA: ICD-10-CM

## 2020-05-06 DIAGNOSIS — F17.200 CURRENT SMOKER: ICD-10-CM

## 2020-05-06 PROCEDURE — 99442 PHONE E/M BY PHYS 11-20 MIN: CPT | Performed by: FAMILY MEDICINE

## 2020-05-06 NOTE — PROGRESS NOTES
Virtual Telephone Check-In    Bettemaricruz Persaud verbally consents to a Virtual/Telephone Check-In visit on 05/06/20. Patient understands and accepts financial responsibility for any deductible, co-insurance and/or co-pays associated with this service. mouth daily. 90 tablet 0   • hydrochlorothiazide 25 MG Oral Tab Take 1 tablet (25 mg total) by mouth daily.  90 tablet 0   • glimepiride 4 MG Oral Tab Take 1 tablet (4 mg total) by mouth every morning before breakfast. 90 tablet 0      Past Medical History: LIPID PANEL        Ck labs soon       (F41.1) Anxiety state  Plan: stable w paxil and xanax       (M16.0) Primary osteoarthritis of hips, bilateral  Plan: to have QUOC in June       (F17.200) Current smoker  Plan: discussed cessation          The patient in

## 2020-05-08 ENCOUNTER — APPOINTMENT (OUTPATIENT)
Dept: LAB | Age: 55
End: 2020-05-08
Attending: FAMILY MEDICINE
Payer: MEDICARE

## 2020-05-08 DIAGNOSIS — E11.9 TYPE 2 DIABETES MELLITUS WITHOUT COMPLICATION, WITHOUT LONG-TERM CURRENT USE OF INSULIN (HCC): ICD-10-CM

## 2020-05-08 PROCEDURE — 80061 LIPID PANEL: CPT | Performed by: FAMILY MEDICINE

## 2020-05-08 PROCEDURE — 80053 COMPREHEN METABOLIC PANEL: CPT | Performed by: FAMILY MEDICINE

## 2020-05-08 PROCEDURE — 36415 COLL VENOUS BLD VENIPUNCTURE: CPT

## 2020-05-08 PROCEDURE — 82043 UR ALBUMIN QUANTITATIVE: CPT

## 2020-05-08 PROCEDURE — 82570 ASSAY OF URINE CREATININE: CPT

## 2020-05-08 PROCEDURE — 83036 HEMOGLOBIN GLYCOSYLATED A1C: CPT

## 2020-05-25 DIAGNOSIS — E11.9 DIABETES MELLITUS WITHOUT COMPLICATION (HCC): ICD-10-CM

## 2020-05-25 DIAGNOSIS — E78.00 PURE HYPERCHOLESTEROLEMIA: ICD-10-CM

## 2020-05-25 DIAGNOSIS — F41.1 ANXIETY STATE: ICD-10-CM

## 2020-05-26 RX ORDER — HYDROCHLOROTHIAZIDE 25 MG/1
25 TABLET ORAL DAILY
Qty: 90 TABLET | Refills: 0 | Status: SHIPPED | OUTPATIENT
Start: 2020-05-26 | End: 2020-07-22

## 2020-05-26 RX ORDER — GLIMEPIRIDE 4 MG/1
4 TABLET ORAL
Qty: 90 TABLET | Refills: 0 | Status: SHIPPED | OUTPATIENT
Start: 2020-05-26 | End: 2020-07-22

## 2020-05-26 RX ORDER — ATORVASTATIN CALCIUM 20 MG/1
20 TABLET, FILM COATED ORAL EVERY EVENING
Qty: 30 TABLET | Refills: 0 | Status: SHIPPED | OUTPATIENT
Start: 2020-05-26 | End: 2020-06-25

## 2020-05-26 RX ORDER — LOSARTAN POTASSIUM 100 MG/1
100 TABLET ORAL DAILY
Qty: 90 TABLET | Refills: 0 | Status: SHIPPED | OUTPATIENT
Start: 2020-05-26 | End: 2020-07-22

## 2020-05-26 RX ORDER — PAROXETINE HYDROCHLORIDE 40 MG/1
TABLET, FILM COATED ORAL
Qty: 90 TABLET | Refills: 0 | Status: SHIPPED | OUTPATIENT
Start: 2020-05-26 | End: 2020-08-23

## 2020-06-08 ENCOUNTER — TELEPHONE (OUTPATIENT)
Dept: INTERNAL MEDICINE CLINIC | Facility: CLINIC | Age: 55
End: 2020-06-08

## 2020-06-09 RX ORDER — HYDROCODONE BITARTRATE AND ACETAMINOPHEN 10; 325 MG/1; MG/1
1 TABLET ORAL
Qty: 150 TABLET | Refills: 0 | OUTPATIENT
Start: 2020-06-09 | End: 2020-07-09

## 2020-06-09 RX ORDER — HYDROCODONE BITARTRATE AND ACETAMINOPHEN 10; 325 MG/1; MG/1
1 TABLET ORAL
Qty: 150 TABLET | Refills: 0 | Status: SHIPPED | OUTPATIENT
Start: 2020-06-09 | End: 2020-07-09

## 2020-06-09 NOTE — TELEPHONE ENCOUNTER
Future Appointments   Date Time Provider Larisa Diallo   9/53/9794  5:97 PM Juan Heller MD [facility] Shaneka Sahu

## 2020-06-11 ENCOUNTER — TELEMEDICINE (OUTPATIENT)
Dept: RHEUMATOLOGY | Facility: CLINIC | Age: 55
End: 2020-06-11

## 2020-06-11 VITALS — WEIGHT: 200 LBS | HEIGHT: 64 IN | BODY MASS INDEX: 34.15 KG/M2

## 2020-06-11 DIAGNOSIS — M16.0 PRIMARY OSTEOARTHRITIS OF HIPS, BILATERAL: Primary | ICD-10-CM

## 2020-06-11 DIAGNOSIS — M47.896 OTHER OSTEOARTHRITIS OF SPINE, LUMBAR REGION: ICD-10-CM

## 2020-06-11 DIAGNOSIS — F41.1 ANXIETY STATE: ICD-10-CM

## 2020-06-11 DIAGNOSIS — M17.0 PRIMARY OSTEOARTHRITIS OF BOTH KNEES: ICD-10-CM

## 2020-06-11 PROCEDURE — 99442 PHONE E/M BY PHYS 11-20 MIN: CPT | Performed by: INTERNAL MEDICINE

## 2020-06-11 RX ORDER — CYCLOBENZAPRINE HCL 5 MG
5 TABLET ORAL 2 TIMES DAILY PRN
Qty: 60 TABLET | Refills: 1 | Status: SHIPPED | OUTPATIENT
Start: 2020-06-11 | End: 2021-04-13

## 2020-06-11 RX ORDER — HYDROCODONE BITARTRATE AND ACETAMINOPHEN 10; 325 MG/1; MG/1
1 TABLET ORAL
Qty: 150 TABLET | Refills: 0 | Status: SHIPPED | OUTPATIENT
Start: 2020-08-08 | End: 2020-09-07

## 2020-06-11 RX ORDER — HYDROCODONE BITARTRATE AND ACETAMINOPHEN 10; 325 MG/1; MG/1
1 TABLET ORAL
Qty: 150 TABLET | Refills: 0 | Status: SHIPPED | OUTPATIENT
Start: 2020-09-07 | End: 2020-10-07

## 2020-06-11 RX ORDER — HYDROCODONE BITARTRATE AND ACETAMINOPHEN 10; 325 MG/1; MG/1
1 TABLET ORAL
Qty: 150 TABLET | Refills: 0 | Status: SHIPPED | OUTPATIENT
Start: 2020-07-09 | End: 2020-08-08

## 2020-06-11 NOTE — PROGRESS NOTES
Virtual Telephone Check-In    Fadi Marie verbally consents to a Virtual/Telephone Check-In visit on 06/11/20. Patient has been referred to the [facility] website at [email or web address] to review the yearly Consent to Treat document.     Patient underst

## 2020-06-25 DIAGNOSIS — E78.00 PURE HYPERCHOLESTEROLEMIA: ICD-10-CM

## 2020-06-25 DIAGNOSIS — E11.9 DIABETES MELLITUS WITHOUT COMPLICATION (HCC): ICD-10-CM

## 2020-06-25 RX ORDER — ATORVASTATIN CALCIUM 20 MG/1
TABLET, FILM COATED ORAL
Qty: 30 TABLET | Refills: 0 | Status: SHIPPED | OUTPATIENT
Start: 2020-06-25 | End: 2020-07-22

## 2020-07-22 DIAGNOSIS — E78.00 PURE HYPERCHOLESTEROLEMIA: ICD-10-CM

## 2020-07-22 DIAGNOSIS — F41.1 ANXIETY STATE: ICD-10-CM

## 2020-07-22 DIAGNOSIS — E11.9 DIABETES MELLITUS WITHOUT COMPLICATION (HCC): ICD-10-CM

## 2020-07-23 RX ORDER — PAROXETINE HYDROCHLORIDE 40 MG/1
TABLET, FILM COATED ORAL
Qty: 90 TABLET | Refills: 0 | OUTPATIENT
Start: 2020-07-23

## 2020-07-23 RX ORDER — HYDROCHLOROTHIAZIDE 25 MG/1
25 TABLET ORAL DAILY
Qty: 90 TABLET | Refills: 0 | Status: SHIPPED | OUTPATIENT
Start: 2020-07-23 | End: 2020-11-17

## 2020-07-23 RX ORDER — GLIMEPIRIDE 4 MG/1
4 TABLET ORAL
Qty: 90 TABLET | Refills: 0 | Status: SHIPPED | OUTPATIENT
Start: 2020-07-23 | End: 2020-11-17

## 2020-07-23 RX ORDER — LOSARTAN POTASSIUM 100 MG/1
100 TABLET ORAL DAILY
Qty: 90 TABLET | Refills: 0 | Status: SHIPPED | OUTPATIENT
Start: 2020-07-23 | End: 2020-11-17

## 2020-07-23 RX ORDER — ATORVASTATIN CALCIUM 20 MG/1
20 TABLET, FILM COATED ORAL EVERY EVENING
Qty: 30 TABLET | Refills: 0 | Status: SHIPPED | OUTPATIENT
Start: 2020-07-23 | End: 2020-08-23

## 2020-07-24 NOTE — TELEPHONE ENCOUNTER
Future Appointments   Date Time Provider Larisa Diallo   7/28/2020  1:00 PM Rick Leventhal, APRN [facility] [place]

## 2020-07-28 ENCOUNTER — OFFICE VISIT (OUTPATIENT)
Dept: INTERNAL MEDICINE CLINIC | Facility: CLINIC | Age: 55
End: 2020-07-28
Payer: MEDICARE

## 2020-07-28 VITALS
BODY MASS INDEX: 36.62 KG/M2 | DIASTOLIC BLOOD PRESSURE: 78 MMHG | TEMPERATURE: 98 F | SYSTOLIC BLOOD PRESSURE: 120 MMHG | WEIGHT: 214.5 LBS | RESPIRATION RATE: 16 BRPM | HEART RATE: 90 BPM | HEIGHT: 64 IN

## 2020-07-28 DIAGNOSIS — Z23 NEED FOR PNEUMOCOCCAL VACCINATION: ICD-10-CM

## 2020-07-28 DIAGNOSIS — S29.011A MUSCLE STRAIN OF CHEST WALL, INITIAL ENCOUNTER: ICD-10-CM

## 2020-07-28 DIAGNOSIS — F41.1 ANXIETY STATE: ICD-10-CM

## 2020-07-28 DIAGNOSIS — E78.00 PURE HYPERCHOLESTEROLEMIA: ICD-10-CM

## 2020-07-28 DIAGNOSIS — E11.9 TYPE 2 DIABETES MELLITUS WITHOUT COMPLICATION, WITHOUT LONG-TERM CURRENT USE OF INSULIN (HCC): ICD-10-CM

## 2020-07-28 DIAGNOSIS — Z12.11 ENCOUNTER FOR SCREENING FECAL OCCULT BLOOD TESTING: Primary | ICD-10-CM

## 2020-07-28 DIAGNOSIS — I10 ESSENTIAL HYPERTENSION, BENIGN: ICD-10-CM

## 2020-07-28 DIAGNOSIS — K21.9 GASTROESOPHAGEAL REFLUX DISEASE WITHOUT ESOPHAGITIS: ICD-10-CM

## 2020-07-28 PROCEDURE — 3078F DIAST BP <80 MM HG: CPT | Performed by: FAMILY MEDICINE

## 2020-07-28 PROCEDURE — G0009 ADMIN PNEUMOCOCCAL VACCINE: HCPCS | Performed by: FAMILY MEDICINE

## 2020-07-28 PROCEDURE — 3008F BODY MASS INDEX DOCD: CPT | Performed by: FAMILY MEDICINE

## 2020-07-28 PROCEDURE — 99214 OFFICE O/P EST MOD 30 MIN: CPT | Performed by: FAMILY MEDICINE

## 2020-07-28 PROCEDURE — 90732 PPSV23 VACC 2 YRS+ SUBQ/IM: CPT | Performed by: FAMILY MEDICINE

## 2020-07-28 PROCEDURE — 3074F SYST BP LT 130 MM HG: CPT | Performed by: FAMILY MEDICINE

## 2020-07-28 NOTE — PROGRESS NOTES
HPI:   Fadi Marie is a 62year old female who presents for recheck of her diabetes. Patient’s FBS have been 90- 120's. Pt tests her blood 1 times daily. Last visit with ophthalmologist was 2/2019. Pt over due. .    Pt has been checking her feet on 05/08/2020 6.6 (H)   08/09/2018 6.1   06/06/2016 6.5 (H)   09/05/2014 6.6 (H)     Cholesterol, Total (mg/dL)   Date Value   05/08/2020 153   08/09/2018 126   08/09/2018 126   06/06/2016 236 (H)   09/05/2014 233 (H)     CHOLESTEROL, TOTAL (mg/dL)   Date V cyclobenzaprine 5 MG Oral Tab Take 1 tablet (5 mg total) by mouth 2 (two) times daily as needed for Muscle spasms.  60 tablet 1   • PARoxetine HCl 40 MG Oral Tab TAKE 1 TABLET BY MOUTH IN THE MORNING 90 tablet 0   • ALPRAZolam 0.5 MG Oral Tab 1 PO TID PRN 4 use: No    Exercise: walking. Diet: watches fats closely and watches sugar closely     REVIEW OF SYSTEMS:   Constitutional: Negative for fatigue and unexpected weight change. HENT: Negative for dental problem. Eyes: Negative for visual disturbance. ASSESSMENT AND PLAN:   Venkata Knapp is a 62year old female who presents for a recheck     Encounter for screening fecal occult blood testing  (primary encounter diagnosis)  Need for pneumococcal vaccination  Pure hypercholesterolemia  Essential h Landy. Pt is over due for her eye exam.  Keep up with regular eye exams and Check feet daily. Stay on your low salt, diabetic diet. Stay as active as you can. See me back as scheduled, every 6 months.      8. Muscle strain of chest wall, initial encounter

## 2020-08-10 DIAGNOSIS — F41.1 ANXIETY STATE: ICD-10-CM

## 2020-08-11 RX ORDER — ALPRAZOLAM 0.5 MG/1
TABLET ORAL
Qty: 40 TABLET | Refills: 0 | Status: SHIPPED | OUTPATIENT
Start: 2020-08-11 | End: 2020-09-13

## 2020-08-23 DIAGNOSIS — E78.00 PURE HYPERCHOLESTEROLEMIA: ICD-10-CM

## 2020-08-23 DIAGNOSIS — F41.1 ANXIETY STATE: ICD-10-CM

## 2020-08-23 DIAGNOSIS — E11.9 DIABETES MELLITUS WITHOUT COMPLICATION (HCC): ICD-10-CM

## 2020-08-24 RX ORDER — PAROXETINE HYDROCHLORIDE 40 MG/1
TABLET, FILM COATED ORAL
Qty: 90 TABLET | Refills: 0 | Status: SHIPPED | OUTPATIENT
Start: 2020-08-24 | End: 2020-11-17

## 2020-08-24 RX ORDER — ATORVASTATIN CALCIUM 20 MG/1
20 TABLET, FILM COATED ORAL EVERY EVENING
Qty: 30 TABLET | Refills: 0 | Status: SHIPPED | OUTPATIENT
Start: 2020-08-24 | End: 2020-09-15

## 2020-09-13 DIAGNOSIS — F41.1 ANXIETY STATE: ICD-10-CM

## 2020-09-14 PROBLEM — R25.2 CRAMPS, EXTREMITY: Status: RESOLVED | Noted: 2019-06-12 | Resolved: 2020-09-14

## 2020-09-14 PROBLEM — H60.502 ACUTE OTITIS EXTERNA OF LEFT EAR: Status: RESOLVED | Noted: 2020-02-25 | Resolved: 2020-09-14

## 2020-09-14 PROBLEM — H90.3 SENSORINEURAL HEARING LOSS (SNHL) OF BOTH EARS: Status: ACTIVE | Noted: 2020-09-14

## 2020-09-14 RX ORDER — ALPRAZOLAM 0.5 MG/1
TABLET ORAL
Qty: 40 TABLET | Refills: 0 | Status: SHIPPED | OUTPATIENT
Start: 2020-09-14 | End: 2020-10-22

## 2020-09-14 NOTE — TELEPHONE ENCOUNTER
Alprazolam 0.5 mg  Last OV relevant to medication: 7-28-20  Last refill date: 8-11-20 #/refills: 0  When pt was asked to return for OV: 6 mo.   Upcoming appt/reason: 9-15-20  Recent labs: none

## 2020-09-15 ENCOUNTER — OFFICE VISIT (OUTPATIENT)
Dept: INTERNAL MEDICINE CLINIC | Facility: CLINIC | Age: 55
End: 2020-09-15
Payer: MEDICARE

## 2020-09-15 VITALS
HEIGHT: 64 IN | DIASTOLIC BLOOD PRESSURE: 84 MMHG | TEMPERATURE: 99 F | BODY MASS INDEX: 36.19 KG/M2 | RESPIRATION RATE: 18 BRPM | HEART RATE: 82 BPM | SYSTOLIC BLOOD PRESSURE: 128 MMHG | WEIGHT: 212 LBS

## 2020-09-15 DIAGNOSIS — F33.0 MILD RECURRENT MAJOR DEPRESSION (HCC): ICD-10-CM

## 2020-09-15 DIAGNOSIS — H90.3 SENSORINEURAL HEARING LOSS (SNHL) OF BOTH EARS: ICD-10-CM

## 2020-09-15 DIAGNOSIS — D50.9 IRON DEFICIENCY ANEMIA, UNSPECIFIED IRON DEFICIENCY ANEMIA TYPE: ICD-10-CM

## 2020-09-15 DIAGNOSIS — M47.896 OTHER OSTEOARTHRITIS OF SPINE, LUMBAR REGION: ICD-10-CM

## 2020-09-15 DIAGNOSIS — E11.59 HYPERTENSION ASSOCIATED WITH TYPE 2 DIABETES MELLITUS (HCC): ICD-10-CM

## 2020-09-15 DIAGNOSIS — E11.9 DIABETES MELLITUS WITHOUT COMPLICATION (HCC): ICD-10-CM

## 2020-09-15 DIAGNOSIS — N18.30 CKD (CHRONIC KIDNEY DISEASE) STAGE 3, GFR 30-59 ML/MIN (HCC): ICD-10-CM

## 2020-09-15 DIAGNOSIS — Z87.442 HISTORY OF KIDNEY STONES: ICD-10-CM

## 2020-09-15 DIAGNOSIS — F17.200 CURRENT SMOKER: ICD-10-CM

## 2020-09-15 DIAGNOSIS — Z00.00 ENCOUNTER FOR ANNUAL HEALTH EXAMINATION: ICD-10-CM

## 2020-09-15 DIAGNOSIS — K21.9 GASTROESOPHAGEAL REFLUX DISEASE WITHOUT ESOPHAGITIS: ICD-10-CM

## 2020-09-15 DIAGNOSIS — Z00.00 LABORATORY EXAMINATION ORDERED AS PART OF A ROUTINE GENERAL MEDICAL EXAMINATION: ICD-10-CM

## 2020-09-15 DIAGNOSIS — F41.1 ANXIETY STATE: ICD-10-CM

## 2020-09-15 DIAGNOSIS — M16.0 PRIMARY OSTEOARTHRITIS OF HIPS, BILATERAL: ICD-10-CM

## 2020-09-15 DIAGNOSIS — I15.2 HYPERTENSION ASSOCIATED WITH TYPE 2 DIABETES MELLITUS (HCC): ICD-10-CM

## 2020-09-15 DIAGNOSIS — E78.00 PURE HYPERCHOLESTEROLEMIA: ICD-10-CM

## 2020-09-15 DIAGNOSIS — I10 ESSENTIAL HYPERTENSION, BENIGN: Primary | ICD-10-CM

## 2020-09-15 DIAGNOSIS — M17.0 PRIMARY OSTEOARTHRITIS OF BOTH KNEES: ICD-10-CM

## 2020-09-15 DIAGNOSIS — E11.9 TYPE 2 DIABETES MELLITUS WITHOUT COMPLICATION, WITHOUT LONG-TERM CURRENT USE OF INSULIN (HCC): ICD-10-CM

## 2020-09-15 DIAGNOSIS — G43.019 INTRACTABLE MIGRAINE WITHOUT AURA AND WITHOUT STATUS MIGRAINOSUS: ICD-10-CM

## 2020-09-15 DIAGNOSIS — J41.0 SMOKERS' COUGH (HCC): ICD-10-CM

## 2020-09-15 DIAGNOSIS — E66.01 CLASS 2 SEVERE OBESITY DUE TO EXCESS CALORIES WITH SERIOUS COMORBIDITY IN ADULT, UNSPECIFIED BMI (HCC): ICD-10-CM

## 2020-09-15 PROBLEM — E66.812 CLASS 2 SEVERE OBESITY DUE TO EXCESS CALORIES WITH SERIOUS COMORBIDITY IN ADULT (HCC): Status: ACTIVE | Noted: 2020-09-15

## 2020-09-15 PROCEDURE — G0438 PPPS, INITIAL VISIT: HCPCS | Performed by: FAMILY MEDICINE

## 2020-09-15 PROCEDURE — 3074F SYST BP LT 130 MM HG: CPT | Performed by: FAMILY MEDICINE

## 2020-09-15 PROCEDURE — 99396 PREV VISIT EST AGE 40-64: CPT | Performed by: FAMILY MEDICINE

## 2020-09-15 PROCEDURE — 96160 PT-FOCUSED HLTH RISK ASSMT: CPT | Performed by: FAMILY MEDICINE

## 2020-09-15 PROCEDURE — 3079F DIAST BP 80-89 MM HG: CPT | Performed by: FAMILY MEDICINE

## 2020-09-15 PROCEDURE — 3008F BODY MASS INDEX DOCD: CPT | Performed by: FAMILY MEDICINE

## 2020-09-15 RX ORDER — ATORVASTATIN CALCIUM 20 MG/1
20 TABLET, FILM COATED ORAL EVERY EVENING
Qty: 30 TABLET | Refills: 2 | Status: SHIPPED | OUTPATIENT
Start: 2020-09-15 | End: 2020-10-24

## 2020-09-15 NOTE — PROGRESS NOTES
HPI:   Tj Barton is a 62year old female who presents for a Medicare Subsequent Annual Wellness visit (Pt already had Initial Annual Wellness).     Regular SBE- yes,Sexually active- occ  Contraception- menopausal.     Screening:    Immunization Histor Y  Bathing or Showering: Able without help  Dressing: Need some help      She has Meal Preparation difficulties based on screening of functional status.    Preparing your meals: Need some help  She has difficulties Shopping for Groceries based on screening link)        CAGE Alcohol screening   Samantha Berkowitz was screened for Alcohol abuse and had a score of 0 so is at low risk.     Patient Care Team: Patient Care Team:  Garrick Lozoya MD as PCP - General (Family Practice)  Arpita Del Valle  [address] before breakfast.  losartan 100 MG Oral Tab, Take 1 tablet (100 mg total) by mouth daily. hydrochlorothiazide 25 MG Oral Tab, Take 1 tablet (25 mg total) by mouth daily.   cyclobenzaprine 5 MG Oral Tab, Take 1 tablet (5 mg total) by mouth 2 (two) times shae /84 (BP Location: Right arm, Patient Position: Sitting, Cuff Size: adult)   Pulse 82   Temp 98.9 °F (37.2 °C) (Oral)   Resp 18   Ht 64\"   Wt 212 lb (96.2 kg)   LMP 11/08/2015 (Within Days)   BMI 36.39 kg/m²  Estimated body mass index is 36.39 kg/m 07/28/2020   • TD 04/01/2000   Pended Date(s) Pended   • TDAP 11/11/2019        ASSESSMENT AND OTHER RELEVANT CHRONIC CONDITIONS:   Delfin Stubbs is a 62year old female who presents for a Medicare Assessment.      PLAN SUMMARY:   Diagnoses and all orders present medications. Check sugars daily or as directed. Eat a low fat, low carb, low cholesterol diet. Call with sugars less than 70 or greater than 300. Fasting labs due in Novmber  Please see your specialists as recommended.   Keep up with regular eye ANTIBODY; Future    17. Class 2 severe obesity due to excess calories with serious comorbidity in adult, unspecified BMI ([address]  Pt to work on eating less, plenty of vegetables. Pt to try and stay around 1200 calories per day    18.  Smokers' cough ([address]  - Preventative Physical Exam only, or if medically necessary Electrocardiogram date       Colorectal Cancer Screening      Colonoscopy Screen every 10 years Colonoscopy due on 09/23/2013 Update Health Maintenance if applicable    Flex Sigmoidoscopy Screen ev may be covered with your prescription benefits, but Medicare does not cover unless Medically needed    Zoster  Not covered by Medicare Part B No vaccine history found This may be covered with your pharmacy  prescription benefits      SPECIFIC DISEASE MONIT

## 2020-09-15 NOTE — PATIENT INSTRUCTIONS
Mona Berkowitz's SCREENING SCHEDULE   Tests on this list are recommended by your physician but may not be covered, or covered at this frequency, by your insurer. Please check with your insurance carrier before scheduling to verify coverage.    PREVENTATI or any previous visit.  Limited to patients who meet one of the following criteria:   • Men who are 73-68 years old and have smoked more than 100 cigarettes in their lifetime   • Anyone with a family history    Colorectal Cancer Screening  Covered up to Age Immunizations      Influenza  Covered Annually No orders found for this or any previous visit. Please get every year    Pneumococcal 13 (Prevnar)  Covered Once after 65 No orders found for this or any previous visit.  Please get once after your 65th birth the [address] [place] [address] regarding Advance Directives.

## 2020-09-25 ENCOUNTER — TELEPHONE (OUTPATIENT)
Dept: RHEUMATOLOGY | Facility: CLINIC | Age: 55
End: 2020-09-25

## 2020-09-25 NOTE — TELEPHONE ENCOUNTER
Pt has an appointment on Monday and wondering if flu shots were available.      Called pt back and informed her that they are and put in her appointment note that she would like one

## 2020-09-28 ENCOUNTER — OFFICE VISIT (OUTPATIENT)
Dept: RHEUMATOLOGY | Facility: CLINIC | Age: 55
End: 2020-09-28
Payer: MEDICARE

## 2020-09-28 VITALS
TEMPERATURE: 97 F | OXYGEN SATURATION: 99 % | BODY MASS INDEX: 36.37 KG/M2 | SYSTOLIC BLOOD PRESSURE: 120 MMHG | HEART RATE: 106 BPM | WEIGHT: 213 LBS | RESPIRATION RATE: 16 BRPM | DIASTOLIC BLOOD PRESSURE: 86 MMHG | HEIGHT: 64 IN

## 2020-09-28 DIAGNOSIS — M16.0 PRIMARY OSTEOARTHRITIS OF HIPS, BILATERAL: Primary | ICD-10-CM

## 2020-09-28 DIAGNOSIS — E11.9 TYPE 2 DIABETES MELLITUS WITHOUT COMPLICATION, WITHOUT LONG-TERM CURRENT USE OF INSULIN (HCC): ICD-10-CM

## 2020-09-28 DIAGNOSIS — I15.2 HYPERTENSION ASSOCIATED WITH TYPE 2 DIABETES MELLITUS (HCC): ICD-10-CM

## 2020-09-28 DIAGNOSIS — E11.59 HYPERTENSION ASSOCIATED WITH TYPE 2 DIABETES MELLITUS (HCC): ICD-10-CM

## 2020-09-28 DIAGNOSIS — M17.0 PRIMARY OSTEOARTHRITIS OF BOTH KNEES: ICD-10-CM

## 2020-09-28 DIAGNOSIS — E78.00 PURE HYPERCHOLESTEROLEMIA: ICD-10-CM

## 2020-09-28 PROCEDURE — 99213 OFFICE O/P EST LOW 20 MIN: CPT | Performed by: INTERNAL MEDICINE

## 2020-09-28 PROCEDURE — 3079F DIAST BP 80-89 MM HG: CPT | Performed by: INTERNAL MEDICINE

## 2020-09-28 PROCEDURE — 3074F SYST BP LT 130 MM HG: CPT | Performed by: INTERNAL MEDICINE

## 2020-09-28 PROCEDURE — 3008F BODY MASS INDEX DOCD: CPT | Performed by: INTERNAL MEDICINE

## 2020-09-28 PROCEDURE — 90662 IIV NO PRSV INCREASED AG IM: CPT | Performed by: INTERNAL MEDICINE

## 2020-09-28 PROCEDURE — G0008 ADMIN INFLUENZA VIRUS VAC: HCPCS | Performed by: INTERNAL MEDICINE

## 2020-09-28 RX ORDER — HYDROCODONE BITARTRATE AND ACETAMINOPHEN 10; 325 MG/1; MG/1
1 TABLET ORAL EVERY 4 HOURS PRN
Qty: 150 TABLET | Refills: 0 | Status: SHIPPED | OUTPATIENT
Start: 2020-11-29 | End: 2020-12-28

## 2020-09-28 RX ORDER — MELOXICAM 15 MG/1
15 TABLET ORAL DAILY
Qty: 30 TABLET | Refills: 2 | Status: SHIPPED | OUTPATIENT
Start: 2020-09-28 | End: 2021-04-13

## 2020-09-28 RX ORDER — HYDROCODONE BITARTRATE AND ACETAMINOPHEN 10; 325 MG/1; MG/1
1 TABLET ORAL EVERY 4 HOURS PRN
Qty: 150 TABLET | Refills: 0 | Status: SHIPPED | OUTPATIENT
Start: 2020-10-29 | End: 2020-11-28

## 2020-09-28 RX ORDER — HYDROCODONE BITARTRATE AND ACETAMINOPHEN 10; 325 MG/1; MG/1
1 TABLET ORAL EVERY 4 HOURS PRN
Qty: 150 TABLET | Refills: 0 | Status: SHIPPED | OUTPATIENT
Start: 2020-09-28 | End: 2020-10-28

## 2020-09-28 NOTE — PROGRESS NOTES
[facility]  Dr. Iggy Multani Progress Note     Subjective:   Frantz Castañeda is a(n) 60year old female. Current complaints: Patient presents with: Follow - Up:  C/O Bilateral hip, knee, hand pain- lower back pain.  Patient states pain worst since last v

## 2020-09-28 NOTE — PATIENT INSTRUCTIONS
Continue use of Norco 10 mg every 4-6 hours for pain. Also try Meloxicam 15 mg per day. Use otc diclofenac gel 3-4 times per day. High dose flu shot ordered. Return to office 3 months.

## 2020-10-22 DIAGNOSIS — F41.1 ANXIETY STATE: ICD-10-CM

## 2020-10-22 RX ORDER — ALPRAZOLAM 0.5 MG/1
TABLET ORAL
Qty: 40 TABLET | Refills: 0 | Status: SHIPPED | OUTPATIENT
Start: 2020-10-22 | End: 2020-11-17

## 2020-10-22 NOTE — TELEPHONE ENCOUNTER
ALPRAZolam 0.5 MG Oral Tab         Si PO TID PRN    Disp:  40 tablet    Refills:  0    Start: 10/22/2020    Class: Normal    Non-formulary For: Anxiety state    Last ordered: 1 month ago by Pebbles Jama MD      Last OV:9/15/20   NO PROTOCOL MEDICATIO

## 2020-10-24 DIAGNOSIS — E78.00 PURE HYPERCHOLESTEROLEMIA: ICD-10-CM

## 2020-10-26 RX ORDER — ATORVASTATIN CALCIUM 20 MG/1
20 TABLET, FILM COATED ORAL EVERY EVENING
Qty: 30 TABLET | Refills: 2 | Status: SHIPPED | OUTPATIENT
Start: 2020-10-26 | End: 2020-12-28

## 2020-10-26 NOTE — TELEPHONE ENCOUNTER
Atorvastatin 20 mg  Last OV relevant to medication: 9-15-20  Last refill date: 9-15-20 #/refills: 2  When pt was asked to return for OV: 3-6 mo.   Upcoming appt/reason: none  Recent labs: 5-8-20: Lipid

## 2020-10-29 ENCOUNTER — TELEPHONE (OUTPATIENT)
Dept: INTERNAL MEDICINE CLINIC | Facility: CLINIC | Age: 55
End: 2020-10-29

## 2020-10-29 NOTE — TELEPHONE ENCOUNTER
[facility] with the following message:    Reason: To address the following health maintenance concerns.    Diabetes Care Dilated Eye Exam      Comments:   Received a text message from Dr. Eduardo Lozano office that I need a víctor

## 2020-11-17 DIAGNOSIS — E11.9 DIABETES MELLITUS WITHOUT COMPLICATION (HCC): ICD-10-CM

## 2020-11-17 DIAGNOSIS — F41.1 ANXIETY STATE: ICD-10-CM

## 2020-11-17 RX ORDER — ALPRAZOLAM 0.5 MG/1
TABLET ORAL
Qty: 40 TABLET | Refills: 0 | Status: SHIPPED | OUTPATIENT
Start: 2020-11-17 | End: 2020-12-11

## 2020-11-17 RX ORDER — HYDROCHLOROTHIAZIDE 25 MG/1
25 TABLET ORAL DAILY
Qty: 30 TABLET | Refills: 0 | Status: SHIPPED | OUTPATIENT
Start: 2020-11-17 | End: 2020-12-20

## 2020-11-17 RX ORDER — GLIMEPIRIDE 4 MG/1
4 TABLET ORAL
Qty: 30 TABLET | Refills: 0 | Status: SHIPPED | OUTPATIENT
Start: 2020-11-17 | End: 2020-12-20

## 2020-11-17 RX ORDER — PAROXETINE HYDROCHLORIDE 40 MG/1
TABLET, FILM COATED ORAL
Qty: 90 TABLET | Refills: 0 | Status: SHIPPED | OUTPATIENT
Start: 2020-11-17 | End: 2021-02-15

## 2020-11-17 RX ORDER — LOSARTAN POTASSIUM 100 MG/1
100 TABLET ORAL DAILY
Qty: 30 TABLET | Refills: 0 | Status: SHIPPED | OUTPATIENT
Start: 2020-11-17 | End: 2020-12-20

## 2020-11-17 NOTE — TELEPHONE ENCOUNTER
ALPRAZolam 0.5 MG Oral Tab          Si PO TID PRN    Disp:  40 tablet    Refills:  0    Start: 2020    Class: Normal    Non-formulary For: Anxiety state    Last ordered: 3 weeks ago by Darrell Saunders MD      Last OV:9/15/20  No protocol medication

## 2020-11-17 NOTE — TELEPHONE ENCOUNTER
Paroxetine HCl 40 mg  Filled 8-24-20  Qty 90  0 refills  LOV 9-15-20  No upcoming appt.   Labs scheduled tomorrow

## 2020-11-17 NOTE — TELEPHONE ENCOUNTER
Metformin HCl 1000 mg failed protocol due to  Diabetic Medication Protocol [id number]/2020 02:08 PM   HgBA1C procedure resulted in past 6 months Protocol Details    Last HgBA1C < 7.5    Filled 9-15-20  Qty 60  2 refills  No upcoming appt.   LOV 9-15-20

## 2020-11-18 ENCOUNTER — LAB ENCOUNTER (OUTPATIENT)
Dept: LAB | Age: 55
End: 2020-11-18
Attending: FAMILY MEDICINE
Payer: MEDICARE

## 2020-11-18 DIAGNOSIS — E78.00 PURE HYPERCHOLESTEROLEMIA: ICD-10-CM

## 2020-11-18 DIAGNOSIS — Z00.00 LABORATORY EXAMINATION ORDERED AS PART OF A ROUTINE GENERAL MEDICAL EXAMINATION: ICD-10-CM

## 2020-11-18 DIAGNOSIS — E78.00 HIGH CHOLESTEROL: ICD-10-CM

## 2020-11-18 DIAGNOSIS — E11.9 DIABETES MELLITUS WITHOUT COMPLICATION (HCC): Primary | ICD-10-CM

## 2020-11-18 DIAGNOSIS — E55.9 VITAMIN D DEFICIENCY: Primary | ICD-10-CM

## 2020-11-18 PROCEDURE — 85025 COMPLETE CBC W/AUTO DIFF WBC: CPT | Performed by: FAMILY MEDICINE

## 2020-11-18 PROCEDURE — 80053 COMPREHEN METABOLIC PANEL: CPT | Performed by: FAMILY MEDICINE

## 2020-11-18 PROCEDURE — 36415 COLL VENOUS BLD VENIPUNCTURE: CPT

## 2020-11-18 PROCEDURE — 86803 HEPATITIS C AB TEST: CPT

## 2020-11-18 PROCEDURE — 82306 VITAMIN D 25 HYDROXY: CPT | Performed by: FAMILY MEDICINE

## 2020-11-18 PROCEDURE — 80061 LIPID PANEL: CPT | Performed by: FAMILY MEDICINE

## 2020-11-18 PROCEDURE — 83036 HEMOGLOBIN GLYCOSYLATED A1C: CPT

## 2020-11-18 PROCEDURE — 84443 ASSAY THYROID STIM HORMONE: CPT

## 2020-11-18 RX ORDER — ROSUVASTATIN CALCIUM 10 MG/1
10 TABLET, COATED ORAL NIGHTLY
Qty: 90 TABLET | Refills: 1 | Status: SHIPPED | OUTPATIENT
Start: 2020-11-18 | End: 2021-05-09

## 2020-11-18 RX ORDER — ERGOCALCIFEROL 1.25 MG/1
50000 CAPSULE ORAL WEEKLY
Qty: 4 CAPSULE | Refills: 5 | Status: SHIPPED | OUTPATIENT
Start: 2020-11-18 | End: 2020-12-11

## 2020-12-11 DIAGNOSIS — F41.1 ANXIETY STATE: ICD-10-CM

## 2020-12-13 RX ORDER — ERGOCALCIFEROL 1.25 MG/1
50000 CAPSULE ORAL WEEKLY
Qty: 4 CAPSULE | Refills: 5 | Status: SHIPPED | OUTPATIENT
Start: 2020-12-13 | End: 2021-01-23

## 2020-12-14 RX ORDER — ALPRAZOLAM 0.5 MG/1
TABLET ORAL
Qty: 40 TABLET | Refills: 0 | Status: SHIPPED | OUTPATIENT
Start: 2020-12-14 | End: 2021-01-03

## 2020-12-14 NOTE — TELEPHONE ENCOUNTER
LOV: 9/15/2020 with Farooq Gee NP  RTC: 3-6 months  Last Relevant Labs: 11/18/2020   Filled: 11/17/2020  #40 with 0 refills    Future Appointments   Date Time Provider Larisa Diallo   62/95/6213 26:67 PM Hortencia Aragon MD [facility]

## 2020-12-20 DIAGNOSIS — E11.9 DIABETES MELLITUS WITHOUT COMPLICATION (HCC): ICD-10-CM

## 2020-12-20 DIAGNOSIS — F41.1 ANXIETY STATE: ICD-10-CM

## 2020-12-21 RX ORDER — HYDROCHLOROTHIAZIDE 25 MG/1
25 TABLET ORAL DAILY
Qty: 30 TABLET | Refills: 0 | Status: SHIPPED | OUTPATIENT
Start: 2020-12-21 | End: 2021-01-23

## 2020-12-21 RX ORDER — PAROXETINE HYDROCHLORIDE 40 MG/1
TABLET, FILM COATED ORAL
Qty: 90 TABLET | Refills: 0 | OUTPATIENT
Start: 2020-12-21

## 2020-12-21 RX ORDER — ROSUVASTATIN CALCIUM 10 MG/1
10 TABLET, COATED ORAL NIGHTLY
Qty: 90 TABLET | Refills: 1 | OUTPATIENT
Start: 2020-12-21

## 2020-12-21 RX ORDER — LOSARTAN POTASSIUM 100 MG/1
100 TABLET ORAL DAILY
Qty: 30 TABLET | Refills: 0 | Status: SHIPPED | OUTPATIENT
Start: 2020-12-21 | End: 2021-01-23

## 2020-12-21 RX ORDER — GLIMEPIRIDE 4 MG/1
4 TABLET ORAL
Qty: 30 TABLET | Refills: 0 | Status: SHIPPED | OUTPATIENT
Start: 2020-12-21 | End: 2021-01-23

## 2020-12-21 NOTE — TELEPHONE ENCOUNTER
Paroxetine 40 mg  Filled 11-17-20  Qty 90  0 refills  To early for refill  No upcoming appt. LOV: 9-15-20    Metformin HCL 1000 mg  Filled 11-17-20  Qty 60  0 refills  No upcoming appt.   LOV: 9-15-20    Rosuvastatin 10 mg  Filled 11-18-20  Qty 90  1 refil

## 2020-12-28 ENCOUNTER — OFFICE VISIT (OUTPATIENT)
Dept: RHEUMATOLOGY | Facility: CLINIC | Age: 55
End: 2020-12-28
Payer: MEDICARE

## 2020-12-28 VITALS
RESPIRATION RATE: 18 BRPM | WEIGHT: 213 LBS | DIASTOLIC BLOOD PRESSURE: 90 MMHG | HEIGHT: 64 IN | SYSTOLIC BLOOD PRESSURE: 130 MMHG | BODY MASS INDEX: 36.37 KG/M2 | TEMPERATURE: 97 F | HEART RATE: 88 BPM

## 2020-12-28 DIAGNOSIS — M16.0 PRIMARY OSTEOARTHRITIS OF HIPS, BILATERAL: ICD-10-CM

## 2020-12-28 DIAGNOSIS — M47.896 OTHER OSTEOARTHRITIS OF SPINE, LUMBAR REGION: ICD-10-CM

## 2020-12-28 DIAGNOSIS — M17.0 PRIMARY OSTEOARTHRITIS OF BOTH KNEES: Primary | ICD-10-CM

## 2020-12-28 PROCEDURE — 3008F BODY MASS INDEX DOCD: CPT | Performed by: INTERNAL MEDICINE

## 2020-12-28 PROCEDURE — 3080F DIAST BP >= 90 MM HG: CPT | Performed by: INTERNAL MEDICINE

## 2020-12-28 PROCEDURE — 99213 OFFICE O/P EST LOW 20 MIN: CPT | Performed by: INTERNAL MEDICINE

## 2020-12-28 PROCEDURE — 3075F SYST BP GE 130 - 139MM HG: CPT | Performed by: INTERNAL MEDICINE

## 2020-12-28 RX ORDER — HYDROCODONE BITARTRATE AND ACETAMINOPHEN 10; 325 MG/1; MG/1
1 TABLET ORAL EVERY 4 HOURS PRN
Qty: 150 TABLET | Refills: 0 | Status: SHIPPED | OUTPATIENT
Start: 2021-02-26 | End: 2021-03-28

## 2020-12-28 RX ORDER — CELECOXIB 200 MG/1
200 CAPSULE ORAL DAILY
Qty: 30 CAPSULE | Refills: 3 | Status: SHIPPED | OUTPATIENT
Start: 2020-12-28 | End: 2021-04-09 | Stop reason: ALTCHOICE

## 2020-12-28 RX ORDER — HYDROCODONE BITARTRATE AND ACETAMINOPHEN 10; 325 MG/1; MG/1
1 TABLET ORAL EVERY 4 HOURS PRN
Qty: 150 TABLET | Refills: 0 | Status: SHIPPED | OUTPATIENT
Start: 2020-12-28 | End: 2021-01-27

## 2020-12-28 RX ORDER — HYDROCODONE BITARTRATE AND ACETAMINOPHEN 10; 325 MG/1; MG/1
1 TABLET ORAL EVERY 4 HOURS PRN
Qty: 150 TABLET | Refills: 0 | Status: SHIPPED | OUTPATIENT
Start: 2021-01-27 | End: 2021-02-26

## 2020-12-28 NOTE — PATIENT INSTRUCTIONS
Use Voltaren gel or diclofenac gel apply 3-4 times per day. Use Norco 10 mg every 4-6 hours up to 5 per day for pain. D/c Meloxicam, lack of effectiveness. Trial of Celebrex 200 mg per day for arthritis pain. Return to office  3 months.

## 2020-12-28 NOTE — PROGRESS NOTES
[facility]  Dr. Noelle Castro Progress Note     Subjective:   Fatuma Phillips is a(n) 60year old female. Current complaints: Patient presents with:  Osteoarthritis: est pt- fu 3mo- Pt co stiffness and crapping of bilateral hands. Feels sore.   Cold weat

## 2021-01-03 DIAGNOSIS — F41.1 ANXIETY STATE: ICD-10-CM

## 2021-01-04 RX ORDER — ALPRAZOLAM 0.5 MG/1
TABLET ORAL
Qty: 40 TABLET | Refills: 0 | Status: SHIPPED | OUTPATIENT
Start: 2021-01-04 | End: 2021-01-23

## 2021-01-23 DIAGNOSIS — F41.1 ANXIETY STATE: ICD-10-CM

## 2021-01-23 DIAGNOSIS — E11.9 DIABETES MELLITUS WITHOUT COMPLICATION (HCC): ICD-10-CM

## 2021-01-24 RX ORDER — HYDROCHLOROTHIAZIDE 25 MG/1
25 TABLET ORAL DAILY
Qty: 30 TABLET | Refills: 2 | Status: SHIPPED | OUTPATIENT
Start: 2021-01-24 | End: 2021-04-13

## 2021-01-24 RX ORDER — ERGOCALCIFEROL 1.25 MG/1
50000 CAPSULE ORAL WEEKLY
Qty: 4 CAPSULE | Refills: 3 | Status: SHIPPED | OUTPATIENT
Start: 2021-01-24 | End: 2021-04-13

## 2021-01-24 RX ORDER — LOSARTAN POTASSIUM 100 MG/1
100 TABLET ORAL DAILY
Qty: 30 TABLET | Refills: 2 | Status: SHIPPED | OUTPATIENT
Start: 2021-01-24 | End: 2021-04-13

## 2021-01-24 RX ORDER — GLIMEPIRIDE 4 MG/1
4 TABLET ORAL
Qty: 30 TABLET | Refills: 2 | Status: SHIPPED | OUTPATIENT
Start: 2021-01-24 | End: 2021-04-13

## 2021-01-25 RX ORDER — ALPRAZOLAM 0.5 MG/1
TABLET ORAL
Qty: 40 TABLET | Refills: 0 | Status: SHIPPED | OUTPATIENT
Start: 2021-01-25 | End: 2021-02-17

## 2021-01-25 NOTE — TELEPHONE ENCOUNTER
Alprazolam 0.5mg last filled 1/4/21 #40   Patient to take 1 PO TID PRN    LOV 9/15/20 - Wellness visit with Collin Pablo

## 2021-02-09 ENCOUNTER — TELEPHONE (OUTPATIENT)
Dept: INTERNAL MEDICINE CLINIC | Facility: CLINIC | Age: 56
End: 2021-02-09

## 2021-02-15 DIAGNOSIS — F41.1 ANXIETY STATE: ICD-10-CM

## 2021-02-15 RX ORDER — PAROXETINE HYDROCHLORIDE 40 MG/1
TABLET, FILM COATED ORAL
Qty: 90 TABLET | Refills: 0 | Status: SHIPPED | OUTPATIENT
Start: 2021-02-15 | End: 2021-05-16

## 2021-02-15 RX ORDER — ALPRAZOLAM 0.5 MG/1
TABLET ORAL
Qty: 40 TABLET | Refills: 0 | OUTPATIENT
Start: 2021-02-15

## 2021-02-15 NOTE — TELEPHONE ENCOUNTER
Alprazolam 0.5mg last filled 1/25/21 #40    LOV 9/15/20     Upcoming appt with Chirag Tiwari 3/16/21

## 2021-02-16 DIAGNOSIS — F41.1 ANXIETY STATE: ICD-10-CM

## 2021-02-16 RX ORDER — ALPRAZOLAM 0.5 MG/1
TABLET ORAL
Qty: 40 TABLET | Refills: 0 | Status: CANCELLED | OUTPATIENT
Start: 2021-02-16

## 2021-02-16 RX ORDER — ALPRAZOLAM 0.5 MG/1
TABLET ORAL
Qty: 40 TABLET | Refills: 0 | OUTPATIENT
Start: 2021-02-16

## 2021-02-17 NOTE — TELEPHONE ENCOUNTER
Last refill 1/25/2021 Alprazolam #40 NR    - see mychart message from 2/16/2021 -     LOV:   9/15/2020 Jenny Vides  RTC 6 months    FOV scheduled 3/16/2021 - Jenny Vides

## 2021-03-09 ENCOUNTER — LAB ENCOUNTER (OUTPATIENT)
Dept: LAB | Age: 56
End: 2021-03-09
Attending: FAMILY MEDICINE
Payer: MEDICARE

## 2021-03-09 LAB
CHOLEST SMN-MCNC: 120 MG/DL (ref ?–200)
HDLC SERPL-MCNC: 37 MG/DL (ref 40–59)
LDLC SERPL CALC-MCNC: 32 MG/DL (ref ?–100)
NONHDLC SERPL-MCNC: 83 MG/DL (ref ?–130)
PATIENT FASTING Y/N/NP: YES
TRIGL SERPL-MCNC: 255 MG/DL (ref 30–149)
VIT D+METAB SERPL-MCNC: 74.5 NG/ML (ref 30–100)
VLDLC SERPL CALC-MCNC: 51 MG/DL (ref 0–30)

## 2021-03-09 PROCEDURE — 80061 LIPID PANEL: CPT | Performed by: FAMILY MEDICINE

## 2021-03-09 PROCEDURE — 82306 VITAMIN D 25 HYDROXY: CPT | Performed by: FAMILY MEDICINE

## 2021-03-09 PROCEDURE — 36415 COLL VENOUS BLD VENIPUNCTURE: CPT | Performed by: FAMILY MEDICINE

## 2021-03-18 DIAGNOSIS — Z23 NEED FOR VACCINATION: ICD-10-CM

## 2021-03-19 ENCOUNTER — IMMUNIZATION (OUTPATIENT)
Dept: LAB | Age: 56
End: 2021-03-19
Attending: HOSPITALIST
Payer: MEDICARE

## 2021-03-19 DIAGNOSIS — Z23 NEED FOR VACCINATION: Primary | ICD-10-CM

## 2021-03-19 PROCEDURE — 0001A SARSCOV2 VAC 30MCG/0.3ML IM: CPT

## 2021-03-20 DIAGNOSIS — F41.1 ANXIETY STATE: ICD-10-CM

## 2021-03-21 RX ORDER — ALPRAZOLAM 0.5 MG/1
TABLET ORAL
Qty: 40 TABLET | Refills: 0 | OUTPATIENT
Start: 2021-03-21

## 2021-03-24 DIAGNOSIS — F41.1 ANXIETY STATE: ICD-10-CM

## 2021-03-24 RX ORDER — ALPRAZOLAM 0.5 MG/1
TABLET ORAL
Qty: 40 TABLET | Refills: 0 | OUTPATIENT
Start: 2021-03-24

## 2021-03-24 NOTE — TELEPHONE ENCOUNTER
LOV: 9/15/2020 with HEAVENLY Mckinney  RTC: 3-6 months  Last Relevant Labs: 3/9/2021   Filled: 2/17/2021   #40 with 0 refills    Future Appointments   Date Time Provider Larisa Imani   4/9/2021 10:50 AM [facility] SBSAMMIE COVID VACCINE RESOURCE S[facility] COVIDVAC [place]   6/8/3363 40:72 PM Marcell Shields MD [facility] Deidra Mews   4/13/2021  1:30 PM HEAVENLY Corea [facility] 8 [facility] [place]

## 2021-03-25 ENCOUNTER — PATIENT MESSAGE (OUTPATIENT)
Dept: RHEUMATOLOGY | Facility: CLINIC | Age: 56
End: 2021-03-25

## 2021-03-25 DIAGNOSIS — F41.1 ANXIETY STATE: ICD-10-CM

## 2021-03-25 RX ORDER — ALPRAZOLAM 0.5 MG/1
TABLET ORAL
Qty: 40 TABLET | Refills: 0 | Status: CANCELLED | OUTPATIENT
Start: 2021-03-25

## 2021-03-25 NOTE — TELEPHONE ENCOUNTER
Called pt re: MyChart message. She should not be due for RF until Sunday. Advised Dr Becky Henderson is out of town til Monday and we will address this at that time. Her 2/26/21 appt was changed to 4/9.

## 2021-03-25 NOTE — TELEPHONE ENCOUNTER
ALPRAZolam 0.5 MG Oral Tab           Possible duplicate: Oh to review recent actions on this medication    Si PO TID PRN    Disp:  40 tablet    Refills:  0    Start: 3/25/2021    Class: Normal    Non-formulary For: Anxiety state    Last ordered: 1 m

## 2021-03-25 NOTE — TELEPHONE ENCOUNTER
From: Rylan Berkowitz  To:  Sallee Barthel, MD  Sent: 4/10/3525 12:07 PM CDT  Subject: Prescription Question    Dear Dr. Kaylee Hathaway:    I am requesting a refill on my Norco; the last refill was on February 26th and since my appointment with you which was supp

## 2021-03-27 RX ORDER — ALPRAZOLAM 0.5 MG/1
TABLET ORAL
Qty: 40 TABLET | Refills: 0 | Status: SHIPPED | OUTPATIENT
Start: 2021-03-27 | End: 2021-04-20

## 2021-03-29 ENCOUNTER — PATIENT MESSAGE (OUTPATIENT)
Dept: RHEUMATOLOGY | Facility: CLINIC | Age: 56
End: 2021-03-29

## 2021-03-29 ENCOUNTER — TELEPHONE (OUTPATIENT)
Dept: RHEUMATOLOGY | Facility: CLINIC | Age: 56
End: 2021-03-29

## 2021-03-29 DIAGNOSIS — M47.896 OTHER OSTEOARTHRITIS OF SPINE, LUMBAR REGION: ICD-10-CM

## 2021-03-29 DIAGNOSIS — M47.896 OTHER OSTEOARTHRITIS OF SPINE, LUMBAR REGION: Primary | ICD-10-CM

## 2021-03-29 RX ORDER — HYDROCODONE BITARTRATE AND ACETAMINOPHEN 10; 325 MG/1; MG/1
1 TABLET ORAL EVERY 6 HOURS PRN
Qty: 100 TABLET | Refills: 0 | Status: SHIPPED | OUTPATIENT
Start: 2021-03-29 | End: 2021-03-29

## 2021-03-29 RX ORDER — HYDROCODONE BITARTRATE AND ACETAMINOPHEN 10; 325 MG/1; MG/1
1 TABLET ORAL EVERY 6 HOURS PRN
Qty: 100 TABLET | Refills: 0 | Status: SHIPPED | OUTPATIENT
Start: 2021-03-29 | End: 2021-04-28

## 2021-03-29 NOTE — TELEPHONE ENCOUNTER
From: Taye Berkowitz  To:  Speedy Farias MD  Sent: 9/81/6623 11:12 AM CDT  Subject: Prescription Question    Dear Dr. Mateo Braswell:    Ramandeep Mae to bother you, but I was wondering if you called in my Norco Rx (as requested per my last message), so I have enough

## 2021-04-09 ENCOUNTER — IMMUNIZATION (OUTPATIENT)
Dept: LAB | Age: 56
End: 2021-04-09
Attending: HOSPITALIST
Payer: MEDICARE

## 2021-04-09 ENCOUNTER — OFFICE VISIT (OUTPATIENT)
Dept: RHEUMATOLOGY | Facility: CLINIC | Age: 56
End: 2021-04-09
Payer: MEDICARE

## 2021-04-09 VITALS
DIASTOLIC BLOOD PRESSURE: 80 MMHG | WEIGHT: 215 LBS | HEART RATE: 111 BPM | HEIGHT: 64 IN | TEMPERATURE: 97 F | BODY MASS INDEX: 36.7 KG/M2 | SYSTOLIC BLOOD PRESSURE: 130 MMHG

## 2021-04-09 DIAGNOSIS — M17.0 PRIMARY OSTEOARTHRITIS OF BOTH KNEES: ICD-10-CM

## 2021-04-09 DIAGNOSIS — M47.896 OTHER OSTEOARTHRITIS OF SPINE, LUMBAR REGION: ICD-10-CM

## 2021-04-09 DIAGNOSIS — M16.0 PRIMARY OSTEOARTHRITIS OF HIPS, BILATERAL: Primary | ICD-10-CM

## 2021-04-09 DIAGNOSIS — Z23 NEED FOR VACCINATION: Primary | ICD-10-CM

## 2021-04-09 PROCEDURE — 3075F SYST BP GE 130 - 139MM HG: CPT | Performed by: INTERNAL MEDICINE

## 2021-04-09 PROCEDURE — 3079F DIAST BP 80-89 MM HG: CPT | Performed by: INTERNAL MEDICINE

## 2021-04-09 PROCEDURE — 99213 OFFICE O/P EST LOW 20 MIN: CPT | Performed by: INTERNAL MEDICINE

## 2021-04-09 PROCEDURE — 3008F BODY MASS INDEX DOCD: CPT | Performed by: INTERNAL MEDICINE

## 2021-04-09 PROCEDURE — 0002A SARSCOV2 VAC 30MCG/0.3ML IM: CPT

## 2021-04-09 RX ORDER — HYDROCODONE BITARTRATE AND ACETAMINOPHEN 10; 325 MG/1; MG/1
1 TABLET ORAL EVERY 4 HOURS PRN
Qty: 180 TABLET | Refills: 0 | Status: SHIPPED | OUTPATIENT
Start: 2021-06-15 | End: 2021-04-13

## 2021-04-09 RX ORDER — HYDROCODONE BITARTRATE AND ACETAMINOPHEN 10; 325 MG/1; MG/1
1 TABLET ORAL EVERY 4 HOURS PRN
Qty: 180 TABLET | Refills: 0 | Status: SHIPPED | OUTPATIENT
Start: 2021-04-16 | End: 2021-04-13

## 2021-04-09 RX ORDER — HYDROCODONE BITARTRATE AND ACETAMINOPHEN 10; 325 MG/1; MG/1
1 TABLET ORAL EVERY 4 HOURS PRN
Qty: 180 TABLET | Refills: 0 | Status: SHIPPED | OUTPATIENT
Start: 2021-05-16 | End: 2021-04-13

## 2021-04-09 RX ORDER — DICLOFENAC SODIUM AND MISOPROSTOL 75; 200 MG/1; UG/1
1 TABLET, DELAYED RELEASE ORAL 2 TIMES DAILY
Qty: 60 TABLET | Refills: 2 | Status: SHIPPED | OUTPATIENT
Start: 2021-04-09 | End: 2021-04-13

## 2021-04-09 NOTE — PATIENT INSTRUCTIONS
Stop Celebrex. Trial of Arthrotec 75 mg twice a day with food. Norco 10 mg one tablet every 4 hours. Hips are bone on bone, end stage osteoarthritis, they need to be replaced ASAP. Return to office 3 months.

## 2021-04-09 NOTE — PROGRESS NOTES
[facility]  Dr. Solo Cisse Progress Note     Subjective:   Trisha Phan is a(n) 60year old female. Current complaints: Patient presents with: Follow - Up: LOV 12/28/20, things have been worse since she was last office visit.  Lower spine pain, stab

## 2021-04-12 DIAGNOSIS — F41.1 ANXIETY STATE: ICD-10-CM

## 2021-04-13 ENCOUNTER — OFFICE VISIT (OUTPATIENT)
Dept: INTERNAL MEDICINE CLINIC | Facility: CLINIC | Age: 56
End: 2021-04-13
Payer: MEDICARE

## 2021-04-13 VITALS
OXYGEN SATURATION: 99 % | HEART RATE: 113 BPM | SYSTOLIC BLOOD PRESSURE: 118 MMHG | WEIGHT: 215.25 LBS | TEMPERATURE: 99 F | BODY MASS INDEX: 35.86 KG/M2 | HEIGHT: 65 IN | RESPIRATION RATE: 16 BRPM | DIASTOLIC BLOOD PRESSURE: 76 MMHG

## 2021-04-13 DIAGNOSIS — E11.9 DIABETES MELLITUS WITHOUT COMPLICATION (HCC): Primary | ICD-10-CM

## 2021-04-13 DIAGNOSIS — Z12.31 ENCOUNTER FOR SCREENING MAMMOGRAM FOR BREAST CANCER: ICD-10-CM

## 2021-04-13 DIAGNOSIS — E66.01 CLASS 2 SEVERE OBESITY DUE TO EXCESS CALORIES WITH SERIOUS COMORBIDITY AND BODY MASS INDEX (BMI) OF 35.0 TO 35.9 IN ADULT (HCC): ICD-10-CM

## 2021-04-13 DIAGNOSIS — M47.896 OTHER OSTEOARTHRITIS OF SPINE, LUMBAR REGION: ICD-10-CM

## 2021-04-13 DIAGNOSIS — F33.0 MILD RECURRENT MAJOR DEPRESSION (HCC): ICD-10-CM

## 2021-04-13 DIAGNOSIS — E11.9 TYPE 2 DIABETES MELLITUS WITHOUT COMPLICATION, WITHOUT LONG-TERM CURRENT USE OF INSULIN (HCC): ICD-10-CM

## 2021-04-13 DIAGNOSIS — F17.200 CURRENT SMOKER: ICD-10-CM

## 2021-04-13 DIAGNOSIS — M16.0 PRIMARY OSTEOARTHRITIS OF HIPS, BILATERAL: ICD-10-CM

## 2021-04-13 DIAGNOSIS — K21.9 GASTROESOPHAGEAL REFLUX DISEASE WITHOUT ESOPHAGITIS: ICD-10-CM

## 2021-04-13 DIAGNOSIS — G43.019 INTRACTABLE MIGRAINE WITHOUT AURA AND WITHOUT STATUS MIGRAINOSUS: ICD-10-CM

## 2021-04-13 DIAGNOSIS — E11.59 HYPERTENSION ASSOCIATED WITH TYPE 2 DIABETES MELLITUS (HCC): ICD-10-CM

## 2021-04-13 DIAGNOSIS — E78.00 PURE HYPERCHOLESTEROLEMIA: ICD-10-CM

## 2021-04-13 DIAGNOSIS — Z87.442 HISTORY OF KIDNEY STONES: ICD-10-CM

## 2021-04-13 DIAGNOSIS — I10 ESSENTIAL HYPERTENSION, BENIGN: ICD-10-CM

## 2021-04-13 DIAGNOSIS — J41.0 SMOKERS' COUGH (HCC): ICD-10-CM

## 2021-04-13 DIAGNOSIS — N18.31 STAGE 3A CHRONIC KIDNEY DISEASE (HCC): ICD-10-CM

## 2021-04-13 DIAGNOSIS — H90.3 SENSORINEURAL HEARING LOSS (SNHL) OF BOTH EARS: ICD-10-CM

## 2021-04-13 DIAGNOSIS — M17.0 PRIMARY OSTEOARTHRITIS OF BOTH KNEES: ICD-10-CM

## 2021-04-13 DIAGNOSIS — D50.9 IRON DEFICIENCY ANEMIA, UNSPECIFIED IRON DEFICIENCY ANEMIA TYPE: ICD-10-CM

## 2021-04-13 DIAGNOSIS — F41.1 ANXIETY STATE: ICD-10-CM

## 2021-04-13 DIAGNOSIS — Z00.00 ENCOUNTER FOR ANNUAL HEALTH EXAMINATION: ICD-10-CM

## 2021-04-13 DIAGNOSIS — I15.2 HYPERTENSION ASSOCIATED WITH TYPE 2 DIABETES MELLITUS (HCC): ICD-10-CM

## 2021-04-13 PROCEDURE — 3078F DIAST BP <80 MM HG: CPT | Performed by: FAMILY MEDICINE

## 2021-04-13 PROCEDURE — G0439 PPPS, SUBSEQ VISIT: HCPCS | Performed by: FAMILY MEDICINE

## 2021-04-13 PROCEDURE — 3074F SYST BP LT 130 MM HG: CPT | Performed by: FAMILY MEDICINE

## 2021-04-13 PROCEDURE — 3008F BODY MASS INDEX DOCD: CPT | Performed by: FAMILY MEDICINE

## 2021-04-13 PROCEDURE — 96160 PT-FOCUSED HLTH RISK ASSMT: CPT | Performed by: FAMILY MEDICINE

## 2021-04-13 PROCEDURE — 99396 PREV VISIT EST AGE 40-64: CPT | Performed by: FAMILY MEDICINE

## 2021-04-13 RX ORDER — LOSARTAN POTASSIUM 100 MG/1
100 TABLET ORAL DAILY
Qty: 30 TABLET | Refills: 2 | Status: SHIPPED | OUTPATIENT
Start: 2021-04-13 | End: 2021-07-07

## 2021-04-13 RX ORDER — ALPRAZOLAM 0.5 MG/1
TABLET ORAL
Qty: 40 TABLET | Refills: 0 | OUTPATIENT
Start: 2021-04-13

## 2021-04-13 RX ORDER — HYDROCHLOROTHIAZIDE 25 MG/1
25 TABLET ORAL DAILY
Qty: 30 TABLET | Refills: 2 | Status: SHIPPED | OUTPATIENT
Start: 2021-04-13 | End: 2021-07-07

## 2021-04-13 RX ORDER — GLIMEPIRIDE 4 MG/1
4 TABLET ORAL
Qty: 30 TABLET | Refills: 2 | Status: SHIPPED | OUTPATIENT
Start: 2021-04-13 | End: 2021-07-07

## 2021-04-13 NOTE — TELEPHONE ENCOUNTER
No protocol     Last refill:  3/27/2021 Alprazolam 0.5 mg #40 NR    LOV:   9/15/2020 Manav Wellington RTC 6 months  FOV scheduled 4/22/2021

## 2021-04-13 NOTE — PROGRESS NOTES
HPI:   Marie Alvarado is a 60year old female who presents for a MA (Medicare Advantage) Supervisit (Once per calendar year). Marie Alvarado is a 60year old female who presents for recheck of her diabetes. Patient’s FBS have been 's.   Pt tests 05/08/2020 40   08/09/2018 40   08/09/2018 50     HDL CHOLESTEROL (mg/dL)   Date Value   05/01/2019 41 (L)   03/05/2013 45 (L)   12/28/2011 48     HDL CHOL (mg/dL)   Date Value   09/19/2013 43 (L)     LDL Cholesterol (mg/dL)   Date Value   03/09/2021 32 screening of functional status. Difficulty dressing or bathing?: Y  Bathing or Showering: Need some help  Dressing: Need some help      She has Meal Preparation difficulties based on screening of functional status.    Preparing your meals: Need some help as YES and refresh this link)          CAGE screening score of 0 on 4/13/2021, showing low risk of alcohol abuse.         Patient Care Team: Patient Care Team:  Oleg Ryan MD as PCP - General (Family Practice)  Evonne Miranda MD ([facility]) 1 tablet (4 mg total) by mouth every morning before breakfast.  losartan 100 MG Oral Tab, Take 1 tablet (100 mg total) by mouth daily. hydrochlorothiazide 25 MG Oral Tab, Take 1 tablet (25 mg total) by mouth daily.   [DISCONTINUED] HYDROcodone-acetaminophe feels well otherwise  SKIN: denies any unusual skin lesions  EYES: denies blurred vision or double vision  HEENT: denies nasal congestion, sinus pain or ST  LUNGS: denies shortness of breath with exertion,+ smoker  CARDIOVASCULAR: denies chest pain on exer and rhythm, S1 and S2 normal, no murmur, rub, or gallop   Abdomen:   Soft, non-tender, bowel sounds active all four quadrants,  no masses, no organomegaly   Pelvic: Deferred   Extremities: Extremities normal, atraumatic, no cyanosis or edema   Pulses: 2+ a without long-term current use of insulin (HCC)    Mild recurrent major depression (HCC)    Gastroesophageal reflux disease without esophagitis    Primary osteoarthritis of both knees    Primary osteoarthritis of hips, bilateral    Other osteoarthritis of s as scheduled, every 6 months. 6. Gastroesophageal reflux disease without esophagitis  - stable, continue medication.  Avoid trigger foods     7. Primary osteoarthritis of both knees  - Managed by Dr. Joby Odom     8. Primary osteoarthritis of hips, bilat these issues and agrees to the plan. Continue with current treatment plan. Follow up in  6  month(s). Imaging studies ordered. Lab work ordered. Patient reassured. Prescription medication ordered. Reinforced healthy diet, lifestyle, and exercise. Dexascan Every two years No results found for this or any previous visit. No flowsheet data found.     Pap and Pelvic      Pap: Every 3 yrs age 21-65 or Pap+HPV every 5 yrs age 33-67, age 72 and older at high risk Pap Smear,3 Years due on 08/29/2022 [facility] Value   05/01/2019 1.05     Creatinine (mg/dL)   Date Value   11/18/2020 1.39 (H)    No flowsheet data found. Drug Serum Conc  Annually No results found for: DIGOXIN, DIG, VALP No flowsheet data found.        Diabetes      HgbA1C  Annually HEMOGLOBIN A1c

## 2021-04-13 NOTE — PATIENT INSTRUCTIONS
Bobby Berkowitz's SCREENING SCHEDULE   Tests on this list are recommended by your physician but may not be covered, or covered at this frequency, by your insurer. Please check with your insurance carrier before scheduling to verify coverage.    PREVENTATI or any previous visit.  Limited to patients who meet one of the following criteria:   • Men who are 73-68 years old and have smoked more than 100 cigarettes in their lifetime   • Anyone with a family history    Colorectal Cancer Screening  Covered up to Age Influenza  Covered Annually Orders placed or performed in visit on 09/28/20   • FLU VACC HIGH DOSE PRSV FREE    Please get every year    Pneumococcal 13 (Prevnar)  Covered Once after 65 No orders found for this or any previous visit.  Please get once a has information from the [address] [place] [address] regarding Advance Directives.

## 2021-04-20 ENCOUNTER — TELEPHONE (OUTPATIENT)
Dept: INTERNAL MEDICINE CLINIC | Facility: CLINIC | Age: 56
End: 2021-04-20

## 2021-04-20 DIAGNOSIS — F41.1 ANXIETY STATE: ICD-10-CM

## 2021-04-20 RX ORDER — ALPRAZOLAM 0.5 MG/1
TABLET ORAL
Qty: 40 TABLET | Refills: 0 | Status: SHIPPED | OUTPATIENT
Start: 2021-04-20 | End: 2021-05-16

## 2021-04-20 NOTE — TELEPHONE ENCOUNTER
Pre op appt scheduled:    Left total hip   Pre-Op letter in [facility]       Future Appointments   Date Time Provider Larisa Diallo   4/22/2021  1:00 PM HEAVENLY Echeverria [facility] [place]

## 2021-04-20 NOTE — TELEPHONE ENCOUNTER
ALPRAZolam 0.5 MG Oral Tab          Possible duplicate: Oh to review recent actions on this medication    Si PO TID PRN    Disp:  40 tablet    Refills:  0    Start: 2021    Class: Normal    Non-formulary For: Anxiety state    Last ordered: 3 w

## 2021-04-21 RX ORDER — ALPRAZOLAM 0.5 MG/1
TABLET ORAL
Qty: 40 TABLET | Refills: 0 | OUTPATIENT
Start: 2021-04-21

## 2021-05-01 ENCOUNTER — PATIENT MESSAGE (OUTPATIENT)
Dept: RHEUMATOLOGY | Facility: CLINIC | Age: 56
End: 2021-05-01

## 2021-05-08 DIAGNOSIS — F41.1 ANXIETY STATE: ICD-10-CM

## 2021-05-09 DIAGNOSIS — F41.1 ANXIETY STATE: ICD-10-CM

## 2021-05-10 RX ORDER — PAROXETINE HYDROCHLORIDE 40 MG/1
TABLET, FILM COATED ORAL
Qty: 90 TABLET | Refills: 0 | OUTPATIENT
Start: 2021-05-10

## 2021-05-10 RX ORDER — ROSUVASTATIN CALCIUM 10 MG/1
10 TABLET, COATED ORAL NIGHTLY
Qty: 90 TABLET | Refills: 1 | Status: SHIPPED | OUTPATIENT
Start: 2021-05-10 | End: 2021-11-07

## 2021-05-10 RX ORDER — ALPRAZOLAM 0.5 MG/1
TABLET ORAL
Qty: 40 TABLET | Refills: 0 | OUTPATIENT
Start: 2021-05-10

## 2021-05-16 DIAGNOSIS — F41.1 ANXIETY STATE: ICD-10-CM

## 2021-05-17 RX ORDER — ALPRAZOLAM 0.5 MG/1
TABLET ORAL
Qty: 40 TABLET | Refills: 0 | Status: SHIPPED | OUTPATIENT
Start: 2021-05-17 | End: 2021-06-12

## 2021-05-17 RX ORDER — PAROXETINE HYDROCHLORIDE 40 MG/1
TABLET, FILM COATED ORAL
Qty: 90 TABLET | Refills: 0 | Status: SHIPPED | OUTPATIENT
Start: 2021-05-17 | End: 2021-08-15

## 2021-05-17 NOTE — TELEPHONE ENCOUNTER
Medication(s) to Refill:   Requested Prescriptions     Pending Prescriptions Disp Refills   • PARoxetine HCl 40 MG Oral Tab 90 tablet 0     Sig: TAKE 1 TABLET BY MOUTH IN THE MORNING. APPT DUE IN [facility].    • ALPRAZolam 0.5 MG Oral Tab 40 tablet 0     Si

## 2021-06-12 DIAGNOSIS — F41.1 ANXIETY STATE: ICD-10-CM

## 2021-06-14 RX ORDER — ALPRAZOLAM 0.5 MG/1
TABLET ORAL
Qty: 40 TABLET | Refills: 0 | Status: SHIPPED | OUTPATIENT
Start: 2021-06-14 | End: 2021-07-08

## 2021-06-28 ENCOUNTER — LAB ENCOUNTER (OUTPATIENT)
Dept: LAB | Age: 56
End: 2021-06-28
Attending: FAMILY MEDICINE
Payer: MEDICARE

## 2021-06-28 ENCOUNTER — HOSPITAL ENCOUNTER (OUTPATIENT)
Dept: MAMMOGRAPHY | Age: 56
Discharge: HOME | End: 2021-06-28
Attending: FAMILY MEDICINE
Payer: MEDICARE

## 2021-06-28 DIAGNOSIS — E11.9 DIABETES MELLITUS WITHOUT COMPLICATION (HCC): ICD-10-CM

## 2021-06-28 DIAGNOSIS — Z12.31 ENCOUNTER FOR SCREENING MAMMOGRAM FOR BREAST CANCER: ICD-10-CM

## 2021-06-28 PROCEDURE — 36415 COLL VENOUS BLD VENIPUNCTURE: CPT

## 2021-06-28 PROCEDURE — 83036 HEMOGLOBIN GLYCOSYLATED A1C: CPT

## 2021-06-28 PROCEDURE — 82043 UR ALBUMIN QUANTITATIVE: CPT

## 2021-06-28 PROCEDURE — 3061F NEG MICROALBUMINURIA REV: CPT | Performed by: INTERNAL MEDICINE

## 2021-06-28 PROCEDURE — 82570 ASSAY OF URINE CREATININE: CPT

## 2021-06-28 PROCEDURE — 77067 SCR MAMMO BI INCL CAD: CPT | Performed by: FAMILY MEDICINE

## 2021-06-28 PROCEDURE — 77063 BREAST TOMOSYNTHESIS BI: CPT | Performed by: FAMILY MEDICINE

## 2021-06-28 PROCEDURE — 3044F HG A1C LEVEL LT 7.0%: CPT | Performed by: FAMILY MEDICINE

## 2021-06-30 ENCOUNTER — HOSPITAL ENCOUNTER (OUTPATIENT)
Dept: MAMMOGRAPHY | Facility: HOSPITAL | Age: 56
Discharge: HOME | End: 2021-06-30
Attending: FAMILY MEDICINE
Payer: MEDICARE

## 2021-06-30 DIAGNOSIS — R92.2 INCONCLUSIVE MAMMOGRAM: ICD-10-CM

## 2021-06-30 PROCEDURE — 77065 DX MAMMO INCL CAD UNI: CPT | Performed by: FAMILY MEDICINE

## 2021-06-30 PROCEDURE — 77061 BREAST TOMOSYNTHESIS UNI: CPT | Performed by: FAMILY MEDICINE

## 2021-07-06 ENCOUNTER — OFFICE VISIT (OUTPATIENT)
Dept: RHEUMATOLOGY | Facility: CLINIC | Age: 56
End: 2021-07-06
Payer: MEDICARE

## 2021-07-06 VITALS
BODY MASS INDEX: 35.16 KG/M2 | HEART RATE: 116 BPM | OXYGEN SATURATION: 97 % | HEIGHT: 65 IN | TEMPERATURE: 97 F | WEIGHT: 211 LBS | SYSTOLIC BLOOD PRESSURE: 102 MMHG | DIASTOLIC BLOOD PRESSURE: 70 MMHG

## 2021-07-06 DIAGNOSIS — M16.0 PRIMARY OSTEOARTHRITIS OF HIPS, BILATERAL: Primary | ICD-10-CM

## 2021-07-06 DIAGNOSIS — M17.0 PRIMARY OSTEOARTHRITIS OF BOTH KNEES: ICD-10-CM

## 2021-07-06 PROCEDURE — 3078F DIAST BP <80 MM HG: CPT | Performed by: INTERNAL MEDICINE

## 2021-07-06 PROCEDURE — 3074F SYST BP LT 130 MM HG: CPT | Performed by: INTERNAL MEDICINE

## 2021-07-06 PROCEDURE — 3008F BODY MASS INDEX DOCD: CPT | Performed by: INTERNAL MEDICINE

## 2021-07-06 PROCEDURE — 99213 OFFICE O/P EST LOW 20 MIN: CPT | Performed by: INTERNAL MEDICINE

## 2021-07-06 RX ORDER — NABUMETONE 750 MG/1
750 TABLET, FILM COATED ORAL 2 TIMES DAILY
Qty: 60 TABLET | Refills: 3 | Status: SHIPPED | OUTPATIENT
Start: 2021-07-06 | End: 2021-09-03

## 2021-07-06 RX ORDER — HYDROCODONE BITARTRATE AND ACETAMINOPHEN 10; 325 MG/1; MG/1
1 TABLET ORAL EVERY 4 HOURS PRN
Qty: 180 TABLET | Refills: 0 | Status: SHIPPED | OUTPATIENT
Start: 2021-07-14 | End: 2021-08-13

## 2021-07-06 RX ORDER — HYDROCODONE BITARTRATE AND ACETAMINOPHEN 10; 325 MG/1; MG/1
1 TABLET ORAL EVERY 4 HOURS PRN
Status: ON HOLD | COMMUNITY
End: 2021-09-29

## 2021-07-06 RX ORDER — HYDROCODONE BITARTRATE AND ACETAMINOPHEN 10; 325 MG/1; MG/1
1 TABLET ORAL EVERY 4 HOURS PRN
Qty: 180 TABLET | Refills: 0 | Status: SHIPPED | OUTPATIENT
Start: 2021-09-12 | End: 2021-09-30

## 2021-07-06 RX ORDER — HYDROCODONE BITARTRATE AND ACETAMINOPHEN 10; 325 MG/1; MG/1
1 TABLET ORAL EVERY 4 HOURS PRN
Qty: 180 TABLET | Refills: 0 | Status: SHIPPED | OUTPATIENT
Start: 2021-08-13 | End: 2021-09-12

## 2021-07-06 NOTE — PROGRESS NOTES
[facility]  Dr. Leo Ware Progress Note     Subjective:   Brittany Adkins is a(n) 60year old female. Current complaints: Patient presents with:   Follow - Up: LOV 4-9-21; reports feeling worse all the time since LOV \"one big ache\", hands are swollen

## 2021-07-06 NOTE — PATIENT INSTRUCTIONS
Previous no response to Celebrex, Arthrotec or Motrin. Trial of nabumetone 750 mg twice a day as anti-inflammatory pain reliever. Use Norco 10 mg 1 every 4 hours up to 6 a day for chronic pain relief.   Reschedule orthopedic surgeon Dr. Maris Suazo your hip r

## 2021-07-07 DIAGNOSIS — I15.2 HYPERTENSION ASSOCIATED WITH TYPE 2 DIABETES MELLITUS (HCC): ICD-10-CM

## 2021-07-07 DIAGNOSIS — E11.9 DIABETES MELLITUS WITHOUT COMPLICATION (HCC): ICD-10-CM

## 2021-07-07 DIAGNOSIS — E11.59 HYPERTENSION ASSOCIATED WITH TYPE 2 DIABETES MELLITUS (HCC): ICD-10-CM

## 2021-07-08 DIAGNOSIS — F41.1 ANXIETY STATE: ICD-10-CM

## 2021-07-08 RX ORDER — HYDROCHLOROTHIAZIDE 25 MG/1
25 TABLET ORAL DAILY
Qty: 30 TABLET | Refills: 2 | Status: SHIPPED | OUTPATIENT
Start: 2021-07-08 | End: 2021-10-07

## 2021-07-08 RX ORDER — GLIMEPIRIDE 4 MG/1
4 TABLET ORAL
Qty: 30 TABLET | Refills: 2 | Status: SHIPPED | OUTPATIENT
Start: 2021-07-08 | End: 2021-10-07

## 2021-07-08 RX ORDER — LOSARTAN POTASSIUM 100 MG/1
100 TABLET ORAL DAILY
Qty: 30 TABLET | Refills: 2 | Status: SHIPPED | OUTPATIENT
Start: 2021-07-08 | End: 2021-10-11

## 2021-07-08 RX ORDER — ALPRAZOLAM 0.5 MG/1
TABLET ORAL
Qty: 40 TABLET | Refills: 0 | Status: SHIPPED | OUTPATIENT
Start: 2021-07-08 | End: 2021-07-29

## 2021-07-08 NOTE — TELEPHONE ENCOUNTER
Medication(s) to Refill:   Requested Prescriptions     Pending Prescriptions Disp Refills   • ALPRAZolam 0.5 MG Oral Tab 40 tablet 0     Si PO TID PRN       LOV: 20  RTC: 6 months     Last Refill:21    Appointments for Next 30 Days 2021 -

## 2021-07-27 DIAGNOSIS — F41.1 ANXIETY STATE: ICD-10-CM

## 2021-07-28 RX ORDER — ALPRAZOLAM 0.5 MG/1
TABLET ORAL
Qty: 40 TABLET | Refills: 0 | OUTPATIENT
Start: 2021-07-28

## 2021-07-29 DIAGNOSIS — F41.1 ANXIETY STATE: ICD-10-CM

## 2021-07-30 RX ORDER — ALPRAZOLAM 0.5 MG/1
TABLET ORAL
Qty: 40 TABLET | Refills: 0 | Status: SHIPPED | OUTPATIENT
Start: 2021-07-30 | End: 2021-08-31

## 2021-07-30 RX ORDER — ALPRAZOLAM 0.5 MG/1
TABLET ORAL
Qty: 40 TABLET | Refills: 0 | OUTPATIENT
Start: 2021-07-30

## 2021-08-14 DIAGNOSIS — F41.1 ANXIETY STATE: ICD-10-CM

## 2021-08-15 RX ORDER — PAROXETINE HYDROCHLORIDE 40 MG/1
TABLET, FILM COATED ORAL
Qty: 90 TABLET | Refills: 0 | Status: SHIPPED | OUTPATIENT
Start: 2021-08-15 | End: 2021-08-30

## 2021-08-28 ENCOUNTER — OFFICE VISIT (OUTPATIENT)
Dept: INTERNAL MEDICINE CLINIC | Facility: CLINIC | Age: 56
End: 2021-08-28
Payer: MEDICARE

## 2021-08-28 VITALS
BODY MASS INDEX: 35.29 KG/M2 | SYSTOLIC BLOOD PRESSURE: 108 MMHG | WEIGHT: 211.81 LBS | TEMPERATURE: 99 F | DIASTOLIC BLOOD PRESSURE: 72 MMHG | HEIGHT: 65 IN | OXYGEN SATURATION: 97 % | HEART RATE: 115 BPM | RESPIRATION RATE: 22 BRPM

## 2021-08-28 DIAGNOSIS — M25.512 ACUTE PAIN OF LEFT SHOULDER: Primary | ICD-10-CM

## 2021-08-28 DIAGNOSIS — F41.1 ANXIETY STATE: ICD-10-CM

## 2021-08-28 DIAGNOSIS — S16.1XXA STRAIN OF NECK MUSCLE, INITIAL ENCOUNTER: ICD-10-CM

## 2021-08-28 PROCEDURE — 3078F DIAST BP <80 MM HG: CPT | Performed by: FAMILY MEDICINE

## 2021-08-28 PROCEDURE — 3074F SYST BP LT 130 MM HG: CPT | Performed by: FAMILY MEDICINE

## 2021-08-28 PROCEDURE — 3008F BODY MASS INDEX DOCD: CPT | Performed by: FAMILY MEDICINE

## 2021-08-28 PROCEDURE — 99213 OFFICE O/P EST LOW 20 MIN: CPT | Performed by: FAMILY MEDICINE

## 2021-08-28 RX ORDER — METHYLPREDNISOLONE 4 MG/1
TABLET ORAL
Qty: 1 EACH | Refills: 0 | Status: SHIPPED | OUTPATIENT
Start: 2021-08-28 | End: 2021-09-07

## 2021-08-28 NOTE — PROGRESS NOTES
Patient presents with:  Shoulder Pain: left shoulder and neck has stabbing pain that radiates down arm    HPI:     Licha Tomas is a 60year old female who presents today with a chief complaint of  Left shoulder pain.   Cause - Pt states she woke up with HYDROcodone-acetaminophen  MG Oral Tab Take 1 tablet by mouth every 4 (four) hours as needed for Pain.  (Patient not taking: Reported on 8/28/2021 ) 180 tablet 0   • HYDROcodone-acetaminophen  MG Oral Tab Take 1 tablet by mouth every 4 (four) ho distress  SKIN: no rashes,no suspicious lesions  NECK: supple,no adenopathy,no bruits  LUNGS: clear to auscultation  CARDIO: RRR without murmur  EXTREMITIES: Rest of the extremities -->  no cyanosis, clubbing or edema  Exam of left shoulder -->   - Swellin

## 2021-08-30 RX ORDER — PAROXETINE HYDROCHLORIDE 40 MG/1
TABLET, FILM COATED ORAL
Qty: 90 TABLET | Refills: 0 | Status: ON HOLD | OUTPATIENT
Start: 2021-08-30 | End: 2021-09-29

## 2021-08-30 NOTE — TELEPHONE ENCOUNTER
Name from pharmacy: PARoxetine HCl Oral Tablet 40 MG         Will file in chart as: PAROXETINE HCL 40 MG Oral Tab    Sig: TAKE 1 TABLET BY MOUTH IN THE MORNING    Original sig: TAKE 1 TABLET BY MOUTH IN THE MORNING     Disp:  90 tablet    Refills:  0    S

## 2021-08-31 DIAGNOSIS — F41.1 ANXIETY STATE: ICD-10-CM

## 2021-09-01 RX ORDER — ALPRAZOLAM 0.5 MG/1
TABLET ORAL
Qty: 40 TABLET | Refills: 0 | Status: SHIPPED | OUTPATIENT
Start: 2021-09-01 | End: 2021-10-07

## 2021-09-01 NOTE — TELEPHONE ENCOUNTER
LOV: 8/28/2021 with HEAVENLY Tsai  RTC: no follow-up on file  Last Relevant Labs: 6/28/2021   Filled: 7/30/2021   #40 with 0 refills    Future Appointments   Date Time Provider Larisa Diallo   9/29/2021  7:00 AM Edy Palomares MD [facility]

## 2021-09-07 ENCOUNTER — LAB ENCOUNTER (OUTPATIENT)
Dept: LAB | Age: 56
End: 2021-09-07
Attending: ORTHOPAEDIC SURGERY
Payer: MEDICARE

## 2021-09-07 ENCOUNTER — TELEPHONE (OUTPATIENT)
Dept: INTERNAL MEDICINE CLINIC | Facility: CLINIC | Age: 56
End: 2021-09-07

## 2021-09-07 ENCOUNTER — OFFICE VISIT (OUTPATIENT)
Dept: INTERNAL MEDICINE CLINIC | Facility: CLINIC | Age: 56
End: 2021-09-07
Payer: MEDICARE

## 2021-09-07 VITALS
OXYGEN SATURATION: 98 % | SYSTOLIC BLOOD PRESSURE: 117 MMHG | HEART RATE: 117 BPM | BODY MASS INDEX: 34.99 KG/M2 | TEMPERATURE: 100 F | RESPIRATION RATE: 16 BRPM | WEIGHT: 210 LBS | HEIGHT: 65 IN | DIASTOLIC BLOOD PRESSURE: 59 MMHG

## 2021-09-07 DIAGNOSIS — M16.12 PRIMARY OSTEOARTHRITIS OF LEFT HIP: ICD-10-CM

## 2021-09-07 DIAGNOSIS — Z01.818 PRE-OP EXAMINATION: Primary | ICD-10-CM

## 2021-09-07 LAB
ANTIBODY SCREEN: NEGATIVE
APTT PPP: 29.6 SECONDS (ref 25.4–36.1)
BASOPHILS # BLD AUTO: 0.1 X10(3) UL (ref 0–0.2)
BASOPHILS NFR BLD AUTO: 1.6 %
BILIRUB UR QL STRIP.AUTO: NEGATIVE
EOSINOPHIL # BLD AUTO: 0.57 X10(3) UL (ref 0–0.7)
EOSINOPHIL NFR BLD AUTO: 9 %
ERYTHROCYTE [DISTWIDTH] IN BLOOD BY AUTOMATED COUNT: 13.8 %
GLUCOSE UR STRIP.AUTO-MCNC: NEGATIVE MG/DL
HCT VFR BLD AUTO: 36.9 %
HGB BLD-MCNC: 12 G/DL
HYALINE CASTS #/AREA URNS AUTO: PRESENT /LPF
IMM GRANULOCYTES # BLD AUTO: 0.02 X10(3) UL (ref 0–1)
IMM GRANULOCYTES NFR BLD: 0.3 %
INR BLD: 1 (ref 0.89–1.11)
KETONES UR STRIP.AUTO-MCNC: NEGATIVE MG/DL
LEUKOCYTE ESTERASE UR QL STRIP.AUTO: NEGATIVE
LYMPHOCYTES # BLD AUTO: 1.74 X10(3) UL (ref 1–4)
LYMPHOCYTES NFR BLD AUTO: 27.5 %
MCH RBC QN AUTO: 28.5 PG (ref 26–34)
MCHC RBC AUTO-ENTMCNC: 32.5 G/DL (ref 31–37)
MCV RBC AUTO: 87.6 FL
MONOCYTES # BLD AUTO: 0.41 X10(3) UL (ref 0.1–1)
MONOCYTES NFR BLD AUTO: 6.5 %
NEUTROPHILS # BLD AUTO: 3.49 X10 (3) UL (ref 1.5–7.7)
NEUTROPHILS # BLD AUTO: 3.49 X10(3) UL (ref 1.5–7.7)
NEUTROPHILS NFR BLD AUTO: 55.1 %
NITRITE UR QL STRIP.AUTO: NEGATIVE
PH UR STRIP.AUTO: 5 [PH] (ref 5–8)
PLATELET # BLD AUTO: 218 10(3)UL (ref 150–450)
PROT UR STRIP.AUTO-MCNC: 30 MG/DL
PSA SERPL DL<=0.01 NG/ML-MCNC: 13.4 SECONDS (ref 12.2–14.5)
RBC # BLD AUTO: 4.21 X10(6)UL
RBC UR QL AUTO: NEGATIVE
RH BLOOD TYPE: POSITIVE
SP GR UR STRIP.AUTO: 1.03 (ref 1–1.03)
UROBILINOGEN UR STRIP.AUTO-MCNC: <2 MG/DL
WBC # BLD AUTO: 6.3 X10(3) UL (ref 4–11)

## 2021-09-07 PROCEDURE — 86850 RBC ANTIBODY SCREEN: CPT

## 2021-09-07 PROCEDURE — 3078F DIAST BP <80 MM HG: CPT | Performed by: FAMILY MEDICINE

## 2021-09-07 PROCEDURE — 80061 LIPID PANEL: CPT | Performed by: FAMILY MEDICINE

## 2021-09-07 PROCEDURE — 3074F SYST BP LT 130 MM HG: CPT | Performed by: FAMILY MEDICINE

## 2021-09-07 PROCEDURE — 86900 BLOOD TYPING SEROLOGIC ABO: CPT

## 2021-09-07 PROCEDURE — 93000 ELECTROCARDIOGRAM COMPLETE: CPT | Performed by: FAMILY MEDICINE

## 2021-09-07 PROCEDURE — 86901 BLOOD TYPING SEROLOGIC RH(D): CPT

## 2021-09-07 PROCEDURE — 99214 OFFICE O/P EST MOD 30 MIN: CPT | Performed by: FAMILY MEDICINE

## 2021-09-07 PROCEDURE — 85730 THROMBOPLASTIN TIME PARTIAL: CPT

## 2021-09-07 PROCEDURE — 87081 CULTURE SCREEN ONLY: CPT

## 2021-09-07 PROCEDURE — 82306 VITAMIN D 25 HYDROXY: CPT | Performed by: FAMILY MEDICINE

## 2021-09-07 PROCEDURE — 81001 URINALYSIS AUTO W/SCOPE: CPT

## 2021-09-07 PROCEDURE — 3008F BODY MASS INDEX DOCD: CPT | Performed by: FAMILY MEDICINE

## 2021-09-07 PROCEDURE — 85610 PROTHROMBIN TIME: CPT

## 2021-09-07 PROCEDURE — 85025 COMPLETE CBC W/AUTO DIFF WBC: CPT

## 2021-09-07 PROCEDURE — 36415 COLL VENOUS BLD VENIPUNCTURE: CPT | Performed by: FAMILY MEDICINE

## 2021-09-07 PROCEDURE — 80053 COMPREHEN METABOLIC PANEL: CPT | Performed by: FAMILY MEDICINE

## 2021-09-07 NOTE — PROGRESS NOTES
CC: Preop exam.    Danni Cohn is a 60year old female who presents for a pre-operative physical exam  By Marco healy. Patient is to have Left Total Hip Arthroplasty,secondary to hip pain/arthritis to be on 9/299/21.     No prior anaest biopsy   • Diabetes [facility])    • Essential hypertension    • Hearing impairment     no hearing aids L>R   • High blood pressure    • High cholesterol    • Osteoarthritis    • Personal history of urinary calculi    • Rheumatoid arthritis ([address]    • Uterus, ad anemia  ENDOCRINE: denies thyroid history,+DM  ALL/ASTHMA: denies hx of allergy or asthma    EXAM:   /59 (BP Location: Right arm, Patient Position: Sitting, Cuff Size: large)   Pulse 117   Temp 99.6 °F (37.6 °C) (Oral)   Resp 16   Ht 5' 5\" (1.651 m)

## 2021-09-09 ENCOUNTER — TELEPHONE (OUTPATIENT)
Dept: INTERNAL MEDICINE CLINIC | Facility: CLINIC | Age: 56
End: 2021-09-09

## 2021-09-09 DIAGNOSIS — G47.10 HYPERSOMNIA: Primary | ICD-10-CM

## 2021-09-09 NOTE — TELEPHONE ENCOUNTER
Discussed with Pt the needs for a sleep study due to the way the Pt answered the ST[employer]ang questionnaire during her preoperative screening process. Per Dr. Liz Salas does not need to due prior to surgery but soon after. Pt aware and the order placed.

## 2021-09-23 ENCOUNTER — TELEPHONE (OUTPATIENT)
Dept: INTERNAL MEDICINE CLINIC | Facility: CLINIC | Age: 56
End: 2021-09-23

## 2021-09-27 ENCOUNTER — LAB ENCOUNTER (OUTPATIENT)
Dept: LAB | Age: 56
End: 2021-09-27
Attending: ORTHOPAEDIC SURGERY
Payer: MEDICARE

## 2021-09-27 DIAGNOSIS — M16.12 PRIMARY OSTEOARTHRITIS OF LEFT HIP: ICD-10-CM

## 2021-09-28 ENCOUNTER — ANESTHESIA EVENT (OUTPATIENT)
Dept: SURGERY | Facility: HOSPITAL | Age: 56
End: 2021-09-28
Payer: MEDICARE

## 2021-09-28 NOTE — H&P
[facility]  History & Physical    Nova Stager Patient Status:  Outpatient in a Bed    1965 MRN [id number]   [place] [facility] Attending Melissa Allen MD   Hosp Day # 0 PCP Darrell Saunders MD     Date of Admission:  (Not on UPSET    Home Medications:  No medications prior to admission. Physical Exam:   General: Alert, orientated x3. Cooperative. No apparent distress.   Vital Signs:  Height 5' 5\" (1.651 m), weight 211 lb (95.7 kg), last menstrual period 11/08/2015, not

## 2021-09-29 ENCOUNTER — HOSPITAL ENCOUNTER (OUTPATIENT)
Facility: HOSPITAL | Age: 56
Discharge: HOME HEALTH | End: 2021-09-30
Attending: ORTHOPAEDIC SURGERY | Admitting: ORTHOPAEDIC SURGERY
Payer: MEDICARE

## 2021-09-29 ENCOUNTER — APPOINTMENT (OUTPATIENT)
Dept: GENERAL RADIOLOGY | Facility: HOSPITAL | Age: 56
End: 2021-09-29
Attending: ORTHOPAEDIC SURGERY
Payer: MEDICARE

## 2021-09-29 ENCOUNTER — ANESTHESIA (OUTPATIENT)
Dept: SURGERY | Facility: HOSPITAL | Age: 56
End: 2021-09-29
Payer: MEDICARE

## 2021-09-29 DIAGNOSIS — M16.12 PRIMARY OSTEOARTHRITIS OF LEFT HIP: Primary | ICD-10-CM

## 2021-09-29 PROCEDURE — 73501 X-RAY EXAM HIP UNI 1 VIEW: CPT | Performed by: ORTHOPAEDIC SURGERY

## 2021-09-29 PROCEDURE — 0SRB0JA REPLACEMENT OF LEFT HIP JOINT WITH SYNTHETIC SUBSTITUTE, UNCEMENTED, OPEN APPROACH: ICD-10-PCS | Performed by: ORTHOPAEDIC SURGERY

## 2021-09-29 PROCEDURE — 99203 OFFICE O/P NEW LOW 30 MIN: CPT | Performed by: HOSPITALIST

## 2021-09-29 PROCEDURE — 3E0T3BZ INTRODUCTION OF ANESTHETIC AGENT INTO PERIPHERAL NERVES AND PLEXI, PERCUTANEOUS APPROACH: ICD-10-PCS | Performed by: ANESTHESIOLOGY

## 2021-09-29 DEVICE — PINNACLE HIP SOLUTIONS ALTRX POLYETHYLENE ACETABULAR LINER NEUTRAL 36MM ID 58MM OD
Type: IMPLANTABLE DEVICE | Site: HIP | Status: FUNCTIONAL
Brand: PINNACLE ALTRX

## 2021-09-29 DEVICE — PINNACLE CANCELLOUS BONE SCREW 6.5MM X 25MM
Type: IMPLANTABLE DEVICE | Site: HIP | Status: FUNCTIONAL
Brand: PINNACLE

## 2021-09-29 DEVICE — APEX HOLE ELIMINATOR - PS
Type: IMPLANTABLE DEVICE | Site: HIP | Status: FUNCTIONAL
Brand: APEX

## 2021-09-29 DEVICE — CORAIL HIP SYSTEM CEMENTLESS FEMORAL STEM 12/14 AMT 135 DEGREES KHO SIZE 9 HA COATED HIGH OFFSET NO COLLAR
Type: IMPLANTABLE DEVICE | Site: HIP | Status: FUNCTIONAL
Brand: CORAIL

## 2021-09-29 DEVICE — BIOLOX DELTA CERAMIC FEMORAL HEAD +8.5 36MM DIA 12/14 TAPER
Type: IMPLANTABLE DEVICE | Site: HIP | Status: FUNCTIONAL
Brand: BIOLOX DELTA

## 2021-09-29 DEVICE — PINNACLE GRIPTION ACETABULAR SHELL SECTOR 58MM OD
Type: IMPLANTABLE DEVICE | Site: HIP | Status: FUNCTIONAL
Brand: PINNACLE GRIPTION

## 2021-09-29 RX ORDER — ZOLPIDEM TARTRATE 5 MG/1
5 TABLET ORAL NIGHTLY PRN
Status: DISCONTINUED | OUTPATIENT
Start: 2021-09-29 | End: 2021-09-30

## 2021-09-29 RX ORDER — TRANEXAMIC ACID 10 MG/ML
1000 INJECTION, SOLUTION INTRAVENOUS ONCE
Status: COMPLETED | OUTPATIENT
Start: 2021-09-29 | End: 2021-09-29

## 2021-09-29 RX ORDER — DOCUSATE SODIUM 100 MG/1
100 CAPSULE, LIQUID FILLED ORAL 2 TIMES DAILY
Status: DISCONTINUED | OUTPATIENT
Start: 2021-09-29 | End: 2021-09-30

## 2021-09-29 RX ORDER — CEFAZOLIN SODIUM/WATER 2 G/20 ML
2 SYRINGE (ML) INTRAVENOUS ONCE
Status: COMPLETED | OUTPATIENT
Start: 2021-09-29 | End: 2021-09-29

## 2021-09-29 RX ORDER — SENNOSIDES 8.6 MG
17.2 TABLET ORAL NIGHTLY
Status: DISCONTINUED | OUTPATIENT
Start: 2021-09-29 | End: 2021-09-30

## 2021-09-29 RX ORDER — HYDROMORPHONE HYDROCHLORIDE 1 MG/ML
INJECTION, SOLUTION INTRAMUSCULAR; INTRAVENOUS; SUBCUTANEOUS
Status: COMPLETED
Start: 2021-09-29 | End: 2021-09-29

## 2021-09-29 RX ORDER — DEXTROSE MONOHYDRATE 25 G/50ML
50 INJECTION, SOLUTION INTRAVENOUS
Status: DISCONTINUED | OUTPATIENT
Start: 2021-09-29 | End: 2021-09-29 | Stop reason: HOSPADM

## 2021-09-29 RX ORDER — CEFAZOLIN SODIUM/WATER 2 G/20 ML
SYRINGE (ML) INTRAVENOUS
Status: DISPENSED
Start: 2021-09-29 | End: 2021-09-29

## 2021-09-29 RX ORDER — LOSARTAN POTASSIUM 50 MG/1
50 TABLET ORAL DAILY
Status: DISCONTINUED | OUTPATIENT
Start: 2021-09-30 | End: 2021-09-30

## 2021-09-29 RX ORDER — HYDROMORPHONE HYDROCHLORIDE 1 MG/ML
1 INJECTION, SOLUTION INTRAMUSCULAR; INTRAVENOUS; SUBCUTANEOUS EVERY 2 HOUR PRN
Status: DISCONTINUED | OUTPATIENT
Start: 2021-09-29 | End: 2021-09-30

## 2021-09-29 RX ORDER — POLYETHYLENE GLYCOL 3350 17 G/17G
17 POWDER, FOR SOLUTION ORAL DAILY PRN
Status: DISCONTINUED | OUTPATIENT
Start: 2021-09-29 | End: 2021-09-30

## 2021-09-29 RX ORDER — SODIUM CHLORIDE, SODIUM LACTATE, POTASSIUM CHLORIDE, CALCIUM CHLORIDE 600; 310; 30; 20 MG/100ML; MG/100ML; MG/100ML; MG/100ML
INJECTION, SOLUTION INTRAVENOUS CONTINUOUS
Status: DISCONTINUED | OUTPATIENT
Start: 2021-09-29 | End: 2021-09-29

## 2021-09-29 RX ORDER — HYDROCODONE BITARTRATE AND ACETAMINOPHEN 5; 325 MG/1; MG/1
TABLET ORAL
Status: COMPLETED
Start: 2021-09-29 | End: 2021-09-29

## 2021-09-29 RX ORDER — BUPIVACAINE HYDROCHLORIDE 7.5 MG/ML
INJECTION, SOLUTION INTRASPINAL AS NEEDED
Status: DISCONTINUED | OUTPATIENT
Start: 2021-09-29 | End: 2021-09-29 | Stop reason: SURG

## 2021-09-29 RX ORDER — PHENYLEPHRINE HCL 10 MG/ML
VIAL (ML) INJECTION AS NEEDED
Status: DISCONTINUED | OUTPATIENT
Start: 2021-09-29 | End: 2021-09-29 | Stop reason: SURG

## 2021-09-29 RX ORDER — TRAMADOL HYDROCHLORIDE 50 MG/1
50 TABLET ORAL EVERY 6 HOURS PRN
Status: DISCONTINUED | OUTPATIENT
Start: 2021-09-29 | End: 2021-09-29

## 2021-09-29 RX ORDER — ASPIRIN 325 MG
325 TABLET, DELAYED RELEASE (ENTERIC COATED) ORAL 2 TIMES DAILY
Status: DISCONTINUED | OUTPATIENT
Start: 2021-09-29 | End: 2021-09-30

## 2021-09-29 RX ORDER — HYDROMORPHONE HYDROCHLORIDE 1 MG/ML
0.5 INJECTION, SOLUTION INTRAMUSCULAR; INTRAVENOUS; SUBCUTANEOUS EVERY 2 HOUR PRN
Status: DISCONTINUED | OUTPATIENT
Start: 2021-09-29 | End: 2021-09-30

## 2021-09-29 RX ORDER — DIPHENHYDRAMINE HYDROCHLORIDE 50 MG/ML
25 INJECTION INTRAMUSCULAR; INTRAVENOUS ONCE AS NEEDED
Status: ACTIVE | OUTPATIENT
Start: 2021-09-29 | End: 2021-09-29

## 2021-09-29 RX ORDER — HYDROCODONE BITARTRATE AND ACETAMINOPHEN 10; 325 MG/1; MG/1
2 TABLET ORAL EVERY 4 HOURS PRN
Status: DISCONTINUED | OUTPATIENT
Start: 2021-09-29 | End: 2021-09-30

## 2021-09-29 RX ORDER — ACETAMINOPHEN 325 MG/1
650 TABLET ORAL ONCE
Status: DISCONTINUED | OUTPATIENT
Start: 2021-09-29 | End: 2021-09-29 | Stop reason: HOSPADM

## 2021-09-29 RX ORDER — PROCHLORPERAZINE EDISYLATE 5 MG/ML
10 INJECTION INTRAMUSCULAR; INTRAVENOUS EVERY 6 HOURS PRN
Status: DISCONTINUED | OUTPATIENT
Start: 2021-09-29 | End: 2021-09-30

## 2021-09-29 RX ORDER — TRAMADOL HYDROCHLORIDE 50 MG/1
50 TABLET ORAL EVERY 6 HOURS
Status: DISCONTINUED | OUTPATIENT
Start: 2021-09-29 | End: 2021-09-30

## 2021-09-29 RX ORDER — CODEINE SULFATE 30 MG/1
30 TABLET ORAL EVERY 4 HOURS PRN
Status: DISCONTINUED | OUTPATIENT
Start: 2021-09-29 | End: 2021-09-29

## 2021-09-29 RX ORDER — CEFAZOLIN SODIUM/WATER 2 G/20 ML
2 SYRINGE (ML) INTRAVENOUS EVERY 8 HOURS
Status: COMPLETED | OUTPATIENT
Start: 2021-09-29 | End: 2021-09-30

## 2021-09-29 RX ORDER — HYDROCODONE BITARTRATE AND ACETAMINOPHEN 10; 325 MG/1; MG/1
1 TABLET ORAL EVERY 4 HOURS PRN
Status: DISCONTINUED | OUTPATIENT
Start: 2021-09-29 | End: 2021-09-30

## 2021-09-29 RX ORDER — NALOXONE HYDROCHLORIDE 0.4 MG/ML
80 INJECTION, SOLUTION INTRAMUSCULAR; INTRAVENOUS; SUBCUTANEOUS AS NEEDED
Status: DISCONTINUED | OUTPATIENT
Start: 2021-09-29 | End: 2021-09-29 | Stop reason: HOSPADM

## 2021-09-29 RX ORDER — ONDANSETRON 2 MG/ML
4 INJECTION INTRAMUSCULAR; INTRAVENOUS AS NEEDED
Status: DISCONTINUED | OUTPATIENT
Start: 2021-09-29 | End: 2021-09-29 | Stop reason: HOSPADM

## 2021-09-29 RX ORDER — HYDROCODONE BITARTRATE AND ACETAMINOPHEN 5; 325 MG/1; MG/1
1 TABLET ORAL AS NEEDED
Status: COMPLETED | OUTPATIENT
Start: 2021-09-29 | End: 2021-09-29

## 2021-09-29 RX ORDER — SODIUM PHOSPHATE, DIBASIC AND SODIUM PHOSPHATE, MONOBASIC 7; 19 G/133ML; G/133ML
1 ENEMA RECTAL ONCE AS NEEDED
Status: DISCONTINUED | OUTPATIENT
Start: 2021-09-29 | End: 2021-09-30

## 2021-09-29 RX ORDER — BISACODYL 10 MG
10 SUPPOSITORY, RECTAL RECTAL
Status: DISCONTINUED | OUTPATIENT
Start: 2021-09-29 | End: 2021-09-30

## 2021-09-29 RX ORDER — DEXTROSE MONOHYDRATE 25 G/50ML
50 INJECTION, SOLUTION INTRAVENOUS
Status: DISCONTINUED | OUTPATIENT
Start: 2021-09-29 | End: 2021-09-30

## 2021-09-29 RX ORDER — ALPRAZOLAM 0.25 MG/1
0.25 TABLET ORAL 2 TIMES DAILY PRN
Status: DISCONTINUED | OUTPATIENT
Start: 2021-09-29 | End: 2021-09-30

## 2021-09-29 RX ORDER — ROSUVASTATIN CALCIUM 5 MG/1
10 TABLET, COATED ORAL NIGHTLY
Status: DISCONTINUED | OUTPATIENT
Start: 2021-09-29 | End: 2021-09-30

## 2021-09-29 RX ORDER — HYDROMORPHONE HYDROCHLORIDE 1 MG/ML
0.4 INJECTION, SOLUTION INTRAMUSCULAR; INTRAVENOUS; SUBCUTANEOUS EVERY 5 MIN PRN
Status: DISCONTINUED | OUTPATIENT
Start: 2021-09-29 | End: 2021-09-29 | Stop reason: HOSPADM

## 2021-09-29 RX ORDER — PANTOPRAZOLE SODIUM 20 MG/1
20 TABLET, DELAYED RELEASE ORAL DAILY
Status: DISCONTINUED | OUTPATIENT
Start: 2021-09-29 | End: 2021-09-30

## 2021-09-29 RX ORDER — DIPHENHYDRAMINE HCL 25 MG
25 CAPSULE ORAL EVERY 4 HOURS PRN
Status: DISCONTINUED | OUTPATIENT
Start: 2021-09-29 | End: 2021-09-30

## 2021-09-29 RX ORDER — ONDANSETRON 2 MG/ML
4 INJECTION INTRAMUSCULAR; INTRAVENOUS EVERY 4 HOURS PRN
Status: DISCONTINUED | OUTPATIENT
Start: 2021-09-29 | End: 2021-09-30

## 2021-09-29 RX ORDER — PAROXETINE HYDROCHLORIDE 40 MG/1
40 TABLET, FILM COATED ORAL EVERY MORNING
COMMUNITY
End: 2021-11-18

## 2021-09-29 RX ORDER — PAROXETINE HYDROCHLORIDE 20 MG/1
40 TABLET, FILM COATED ORAL EVERY MORNING
Status: DISCONTINUED | OUTPATIENT
Start: 2021-09-30 | End: 2021-09-30

## 2021-09-29 RX ORDER — DIPHENHYDRAMINE HYDROCHLORIDE 50 MG/ML
12.5 INJECTION INTRAMUSCULAR; INTRAVENOUS EVERY 4 HOURS PRN
Status: DISCONTINUED | OUTPATIENT
Start: 2021-09-29 | End: 2021-09-30

## 2021-09-29 RX ORDER — SODIUM CHLORIDE, SODIUM LACTATE, POTASSIUM CHLORIDE, CALCIUM CHLORIDE 600; 310; 30; 20 MG/100ML; MG/100ML; MG/100ML; MG/100ML
INJECTION, SOLUTION INTRAVENOUS CONTINUOUS
Status: DISCONTINUED | OUTPATIENT
Start: 2021-09-29 | End: 2021-09-30

## 2021-09-29 RX ORDER — HYDROCODONE BITARTRATE AND ACETAMINOPHEN 5; 325 MG/1; MG/1
2 TABLET ORAL AS NEEDED
Status: COMPLETED | OUTPATIENT
Start: 2021-09-29 | End: 2021-09-29

## 2021-09-29 RX ADMIN — CEFAZOLIN SODIUM/WATER 2 G: 2 G/20 ML SYRINGE (ML) INTRAVENOUS at 07:28:00

## 2021-09-29 RX ADMIN — PHENYLEPHRINE HCL 100 MCG: 10 MG/ML VIAL (ML) INJECTION at 08:02:00

## 2021-09-29 RX ADMIN — SODIUM CHLORIDE, SODIUM LACTATE, POTASSIUM CHLORIDE, CALCIUM CHLORIDE: 600; 310; 30; 20 INJECTION, SOLUTION INTRAVENOUS at 08:29:00

## 2021-09-29 RX ADMIN — BUPIVACAINE HYDROCHLORIDE 1.5 ML: 7.5 INJECTION, SOLUTION INTRASPINAL at 07:14:00

## 2021-09-29 RX ADMIN — TRANEXAMIC ACID 1000 MG: 10 INJECTION, SOLUTION INTRAVENOUS at 07:23:00

## 2021-09-29 NOTE — OPERATIVE REPORT
DATE OF PROCEDURE:  9/29/2021  PREOPERATIVE DIAGNOSIS: Severe osteoarthritis left hip. POSTOPERATIVE DIAGNOSIS: Severe osteoarthritis left hip. PROCEDURE PERFORMED: Non-cemented left total hip arthroplasty. SURGEON:  ARGENTINA Bruce ASSIST mechanism and the anterior fifth of the vastus lateralis were reflected anteriorly for later repair. An anterior capsulotomy was performed, and the hip was dislocated. There were severe arthritic changes on both the femoral and acetabular sides.  A femoral The vastus lateralis was similarly repaired. The fascia was closed with a running barbed suture. Subcutaneous tissue was closed with inverted 2-0 Vicryl. Skin was closed with staples. An aquacell dressing plus gauze covering was applied.  The patient was p

## 2021-09-29 NOTE — ANESTHESIA POSTPROCEDURE EVALUATION
[facility]    Carl Mcardle Patient Status:  Outpatient in a Bed   Age/Gender 60year old female MRN [id number]   Location [address] Attending Sam Souza MD   Hosp Day # 0 PCP Ying Olvera MD       Anesthesia P

## 2021-09-29 NOTE — ANESTHESIA PREPROCEDURE EVALUATION
PRE-OP EVALUATION    Patient Name: Licha Tomas    Admit Diagnosis: Primary osteoarthritis of left hip [M16.12]    Pre-op Diagnosis: Primary osteoarthritis of left hip [M16.12]    LEFT TOTAL HIP ARTHROPLASTY     Anesthesia Procedure: LEFT TOTAL HIP ARTH tablet (25 mg total) by mouth daily. , Disp: 30 tablet, Rfl: 2, 9/28/2021 at 0800  HYDROcodone-acetaminophen  MG Oral Tab, Take 1 tablet by mouth every 4 (four) hours as needed for Pain., Disp: 180 tablet, Rfl: 0, 9/29/2021  Rosuvastatin Calcium (CRISTINE CO2 26.0 09/07/2021    BUN 24 (H) 09/07/2021    CREATSERUM 1.40 (H) 09/07/2021    GLU 89 09/07/2021    CA 9.5 09/07/2021            Airway      Mallampati: III  Mouth opening: >3 FB  TM distance: 4 - 6 cm  Neck ROM: full Cardiovascular      Rhythm: regular

## 2021-09-29 NOTE — ANESTHESIA PROCEDURE NOTES
Spinal Block  Performed by: Jj Sawant DO  Authorized by: Jj Sawant DO       General Information and Staff    Start Time:  9/29/2021 7:10 AM  End Time:  9/29/2021 7:14 AM  Anesthesiologist:  Jj Sawant DO  Performed by:   Anesthesiologist  Patient Locat

## 2021-09-29 NOTE — INTERVAL H&P NOTE
Pre-op Diagnosis: Primary osteoarthritis of left hip [M16.12]    The above referenced H&P was reviewed by Adrienne Grullon MD on 9/29/2021, the patient was examined and no significant changes have occurred in the patient's condition since the H&P was perfo

## 2021-09-29 NOTE — PROGRESS NOTES
NURSING ADMISSION NOTE      Patient admitted via Cart  Oriented to room. Safety precautions initiated. Bed in low position. Call light in reach. Patient alert and oriented x4. VSS, on RA. Denies N/T. Aquacel dressing, CDI.  Pt out of bed x1 person

## 2021-09-29 NOTE — PHYSICAL THERAPY NOTE
PHYSICAL THERAPY EVALUATION - INPATIENT     Room Number: 381/381-A  Evaluation Date: 9/29/2021  Type of Evaluation: Initial  Physician Order: PT Eval and Treat    Presenting Problem: s/p L QUOC on 9/29/2021  Reason for Therapy: Mobility Dysfunction an to carry objects up the stairs. SUBJECTIVE  \"I was up out of bed right away\"     Patient self-stated goal is not stated.      OBJECTIVE  Precautions: QUOC - anterior; Bed/chair alarm (and no hip flexion > 90 degrees)  Fall Risk: Standard fall risk    W CK    FUNCTIONAL ABILITY STATUS  Gait Assessment   Gait Assistance: Supervision  Distance (ft): 100  Assistive Device: Rolling walker  Pattern: L Decreased stance time (step to pattern, tendency to have BLE externally rotated)  Stoop/Curb Assistance: Not t impairments and comorbidities manifest themselves as functional limitations in independent bed mobility, transfers, and gait.   The patient is below baseline and would benefit from skilled inpatient PT to address the above deficits to assist patient in retu

## 2021-09-30 ENCOUNTER — TELEPHONE (OUTPATIENT)
Dept: RHEUMATOLOGY | Facility: CLINIC | Age: 56
End: 2021-09-30

## 2021-09-30 VITALS
SYSTOLIC BLOOD PRESSURE: 127 MMHG | HEIGHT: 65 IN | RESPIRATION RATE: 18 BRPM | WEIGHT: 213.88 LBS | OXYGEN SATURATION: 92 % | TEMPERATURE: 99 F | DIASTOLIC BLOOD PRESSURE: 58 MMHG | HEART RATE: 112 BPM | BODY MASS INDEX: 35.63 KG/M2

## 2021-09-30 PROBLEM — I10 PRIMARY HYPERTENSION: Status: ACTIVE | Noted: 2019-08-20

## 2021-09-30 PROCEDURE — 99213 OFFICE O/P EST LOW 20 MIN: CPT | Performed by: HOSPITALIST

## 2021-09-30 NOTE — RESPIRATORY THERAPY NOTE
Smoking Cessation Education:    Patient was counseled on Smoking Cessation discussing multiple strategies including nicotine replacement and pharmacologic replacement.     -total time spent counseling: 10min    [] Smoking Cessation Video    [x] Smoking

## 2021-09-30 NOTE — PLAN OF CARE
A&O x 4. VSS. On RA. Left hip pain well controlled with Norco/Dilaudid PRN. Aquacel to left hip C/D/I. Ice pack in place. SCD's/ASA. Up with min assist and walker to bathroom, voiding without issue.  Reviewed POC, pain management, and fall precautions with

## 2021-09-30 NOTE — PLAN OF CARE
Patient alert and oriented x4. VSS, on RA. Pain present to left hip, controlled with PO Vashon. Aquacel dressing, CDI. Patient denies n/t. Voiding in bathroom. Ambulating x1 person SBA. Plan: home with home health when cleared.

## 2021-09-30 NOTE — PHYSICAL THERAPY NOTE
PHYSICAL THERAPY TREATMENT NOTE - INPATIENT    Room Number: 381/381-A     Session: 1     Number of Visits to Meet Established Goals: 1    Presenting Problem: s/p L QUOC on 9/29/2021    ASSESSMENT   Pt able to complete all functional mobility at SBA level Tolerated    PAIN ASSESSMENT   Rating: Unable to rate  Location: L lateral hip mm  Management Techniques: Activity promotion; Body mechanics;  Relaxation; Repositioning    BALANCE Repetitions   10   Sets   1     Patient End of Session: Up in chair; Needs met; Call light within reach; RN aware of session/findings; All patient questions and concerns addressed; SCDs in place;  Ice applied       Therapist PPE: surgical mask, goggles an

## 2021-09-30 NOTE — TELEPHONE ENCOUNTER
HOLLIS Ram [facility] from [facility] called to report Norco 10/325mg #180 for a long time, 30 days at a time. Pt had knee surgery yesterday.   Dr Taya Burns gave #60 10/325mg take 1-2 prn post op pain, up to 10 tabs/day

## 2021-09-30 NOTE — CONSULTS
[facility]  CONSULT     Lyssa Stephen Patient Status:  Outpatient in a Bed    1965 MRN [id number]   [facility] 3SW-A Attending Charbel Fowler MD   Hosp Day # 0 PCP Cherylyn Sandhoff, MD     Reason for consult:medical manageme Problem Relation Age of Onset   • Blood Disorder Mother         brain aneurysm   • Arthritis Father    • Pulmonary Disease Father         COPD   • Heart Disorder Maternal Grandmother    • Heart Disorder Maternal Grandfather         heart attack        Al S1, S2. Regular rate and rhythm. No murmurs, rubs or gallops. Equal pulses. Chest and Back: No tenderness or deformity. Abdomen: Soft, nontender, nondistended. Positive bowel sounds. No rebound, guarding or organomegaly.   Neurologic: No focal neurologi

## 2021-09-30 NOTE — OCCUPATIONAL THERAPY NOTE
OCCUPATIONAL THERAPY QUICK EVALUATION - INPATIENT    Room Number: 381/381-A  Evaluation Date: 9/30/2021     Type of Evaluation: Quick Eval  Presenting Problem: L THR    Physician Order: IP Consult to Occupational Therapy  Reason for Therapy:  ADL/IADL Dysf cooks. Increased time with tub transfer.       OBJECTIVE  Precautions: QUOC - anterior; Bed/chair alarm (and no hip flexion > 90 degrees)  Fall Risk: Standard fall risk    WEIGHT BEARING RESTRICTION  Weight Bearing Restriction: L lower extremity All patient questions and concerns addressed (pt was left with PT)    ASSESSMENT     Patient is a 60year old female admitted on 9/29/2021 for L THR. Complete medical history and occupational profile noted above.  Functional outcome measures completed inclu

## 2021-09-30 NOTE — PROGRESS NOTES
[facility]     Hospitalist Progress Note     Venkata Knapp Patient Status:  Outpatient in a Bed    1965 MRN [id number]   [facility] 3SW-A Attending Edy Palomares MD   Hosp Day # 0 PCP Sunita Billy MD     Chief Complaint: s/ losartan  50 mg Oral Daily   • PARoxetine HCl  40 mg Oral QAM   • rosuvastatin  10 mg Oral Nightly       Assessment & Plan:    · OA- S/p eft total hip arthroplasty  ? Pain control, PT/OT, IS  ?  Per Sx   · Essential hypertension- home meds  · DM type II- Hy

## 2021-09-30 NOTE — CM/SW NOTE
09/30/21 1000   CM/SW Referral Data   Referral Source Social Work (self-referral)   Reason for Referral Discharge planning   Informant Patient; EMR   Patient Info   Patient's Current Mental Status at Time of Assessment Alert; Oriented   Discharge Needs

## 2021-10-07 DIAGNOSIS — F41.1 ANXIETY STATE: ICD-10-CM

## 2021-10-07 DIAGNOSIS — E11.9 DIABETES MELLITUS WITHOUT COMPLICATION (HCC): ICD-10-CM

## 2021-10-07 DIAGNOSIS — E11.59 HYPERTENSION ASSOCIATED WITH TYPE 2 DIABETES MELLITUS (HCC): ICD-10-CM

## 2021-10-07 DIAGNOSIS — I15.2 HYPERTENSION ASSOCIATED WITH TYPE 2 DIABETES MELLITUS (HCC): ICD-10-CM

## 2021-10-08 RX ORDER — ALPRAZOLAM 0.5 MG/1
TABLET ORAL
Qty: 40 TABLET | Refills: 0 | Status: SHIPPED | OUTPATIENT
Start: 2021-10-08 | End: 2021-11-07

## 2021-10-08 RX ORDER — GLIMEPIRIDE 4 MG/1
4 TABLET ORAL
Qty: 30 TABLET | Refills: 2 | Status: SHIPPED | OUTPATIENT
Start: 2021-10-08 | End: 2022-01-11

## 2021-10-08 RX ORDER — HYDROCHLOROTHIAZIDE 25 MG/1
25 TABLET ORAL DAILY
Qty: 30 TABLET | Refills: 2 | Status: SHIPPED | OUTPATIENT
Start: 2021-10-08 | End: 2022-01-11

## 2021-10-08 NOTE — TELEPHONE ENCOUNTER
ALPRAZolam 0.5 MG Oral Tab          Si PO TID PRN    Disp:  40 tablet    Refills:  0    Start: 10/7/2021    Class: Normal    Non-formulary For: Anxiety state    Last ordered: 1 month ago by Sarah Cortes MD          Last OV: 2021     Last Refill: 9

## 2021-10-10 DIAGNOSIS — E11.59 HYPERTENSION ASSOCIATED WITH TYPE 2 DIABETES MELLITUS (HCC): ICD-10-CM

## 2021-10-10 DIAGNOSIS — I15.2 HYPERTENSION ASSOCIATED WITH TYPE 2 DIABETES MELLITUS (HCC): ICD-10-CM

## 2021-10-11 RX ORDER — LOSARTAN POTASSIUM 100 MG/1
TABLET ORAL
Qty: 30 TABLET | Refills: 2 | Status: SHIPPED | OUTPATIENT
Start: 2021-10-11 | End: 2022-01-11

## 2021-10-25 PROBLEM — Z96.642 STATUS POST TOTAL HIP REPLACEMENT, LEFT: Status: ACTIVE | Noted: 2021-10-25

## 2021-10-25 PROBLEM — M25.552 ACUTE PAIN OF LEFT HIP: Status: ACTIVE | Noted: 2021-10-25

## 2021-11-02 ENCOUNTER — OFFICE VISIT (OUTPATIENT)
Dept: RHEUMATOLOGY | Facility: CLINIC | Age: 56
End: 2021-11-02
Payer: MEDICARE

## 2021-11-02 VITALS
HEART RATE: 116 BPM | HEIGHT: 65 IN | TEMPERATURE: 97 F | WEIGHT: 223 LBS | SYSTOLIC BLOOD PRESSURE: 118 MMHG | BODY MASS INDEX: 37.15 KG/M2 | OXYGEN SATURATION: 97 % | DIASTOLIC BLOOD PRESSURE: 80 MMHG

## 2021-11-02 DIAGNOSIS — M17.0 PRIMARY OSTEOARTHRITIS OF BOTH KNEES: ICD-10-CM

## 2021-11-02 DIAGNOSIS — Z96.642 HISTORY OF TOTAL LEFT HIP REPLACEMENT: Primary | ICD-10-CM

## 2021-11-02 DIAGNOSIS — M16.11 PRIMARY OSTEOARTHRITIS OF RIGHT HIP: ICD-10-CM

## 2021-11-02 DIAGNOSIS — M47.896 OTHER OSTEOARTHRITIS OF SPINE, LUMBAR REGION: ICD-10-CM

## 2021-11-02 PROBLEM — M25.552 ACUTE PAIN OF LEFT HIP: Status: RESOLVED | Noted: 2021-10-25 | Resolved: 2021-11-02

## 2021-11-02 PROCEDURE — 99214 OFFICE O/P EST MOD 30 MIN: CPT | Performed by: INTERNAL MEDICINE

## 2021-11-02 PROCEDURE — 3074F SYST BP LT 130 MM HG: CPT | Performed by: INTERNAL MEDICINE

## 2021-11-02 PROCEDURE — 3079F DIAST BP 80-89 MM HG: CPT | Performed by: INTERNAL MEDICINE

## 2021-11-02 PROCEDURE — 3008F BODY MASS INDEX DOCD: CPT | Performed by: INTERNAL MEDICINE

## 2021-11-02 RX ORDER — HYDROCODONE BITARTRATE AND ACETAMINOPHEN 10; 325 MG/1; MG/1
1 TABLET ORAL EVERY 4 HOURS PRN
Qty: 180 TABLET | Refills: 0 | Status: SHIPPED | OUTPATIENT
Start: 2022-01-01 | End: 2022-01-31

## 2021-11-02 RX ORDER — HYDROCODONE BITARTRATE AND ACETAMINOPHEN 10; 325 MG/1; MG/1
1 TABLET ORAL EVERY 4 HOURS PRN
Qty: 180 TABLET | Refills: 0 | Status: SHIPPED | OUTPATIENT
Start: 2021-12-02 | End: 2022-01-01

## 2021-11-02 RX ORDER — HYDROCODONE BITARTRATE AND ACETAMINOPHEN 10; 325 MG/1; MG/1
1 TABLET ORAL EVERY 4 HOURS PRN
Qty: 180 TABLET | Refills: 0 | Status: SHIPPED | OUTPATIENT
Start: 2021-11-02 | End: 2021-12-02

## 2021-11-02 NOTE — PATIENT INSTRUCTIONS
Use calcium with vitamin d, one tablet every other day to help your bones heal following surgery. 500 mg of calcium with 400 units of Vitamin D is a common supplement. Use Norco 10 mg for pain one every 4 hours as needed for pain.   In the future, needs t

## 2021-11-02 NOTE — PROGRESS NOTES
[facility]  Dr. Laverne Layton Progress Note     Subjective:   Nupur Palomares is a(n) 60year old female. Current complaints: Patient presents with:   Follow - Up: LOV 7-6-21; L THR with Dr Lendell Cowden 9-29-21 doing ok; in PT 2 times/week; no labs or imaging

## 2021-11-07 DIAGNOSIS — F41.1 ANXIETY STATE: ICD-10-CM

## 2021-11-08 RX ORDER — ROSUVASTATIN CALCIUM 10 MG/1
TABLET, COATED ORAL
Qty: 90 TABLET | Refills: 0 | OUTPATIENT
Start: 2021-11-08

## 2021-11-08 RX ORDER — ALPRAZOLAM 0.5 MG/1
TABLET ORAL
Qty: 40 TABLET | Refills: 0 | Status: SHIPPED | OUTPATIENT
Start: 2021-11-08 | End: 2021-11-30

## 2021-11-08 RX ORDER — ROSUVASTATIN CALCIUM 10 MG/1
10 TABLET, COATED ORAL NIGHTLY
Qty: 90 TABLET | Refills: 1 | Status: SHIPPED | OUTPATIENT
Start: 2021-11-08

## 2021-11-08 NOTE — TELEPHONE ENCOUNTER
ALPRAZolam 0.5 MG Oral Tab          Si PO TID PRN    Disp:  40 tablet    Refills:  0    Start: 2021    Class: Normal    Non-formulary For: Anxiety state    Last ordered: 1 month ago by Ramin Chery MD          Last OV: 2021 (pre-op)    Last R

## 2021-11-17 NOTE — TELEPHONE ENCOUNTER
Name from pharmacy: PARoxetine HCl Oral Tablet 40 MG          Will file in chart as: PAROXETINE HCL 40 MG Oral Tab    Sig: TAKE 1 TABLET BY MOUTH EVERY DAY in the morning    Disp:  90 tablet    Refills:  0    Start: 11/17/2021    Class: Normal    Non-formu

## 2021-11-18 RX ORDER — PAROXETINE HYDROCHLORIDE 40 MG/1
TABLET, FILM COATED ORAL
Qty: 90 TABLET | Refills: 0 | Status: SHIPPED | OUTPATIENT
Start: 2021-11-18 | End: 2022-01-11

## 2021-11-30 DIAGNOSIS — F41.1 ANXIETY STATE: ICD-10-CM

## 2021-11-30 RX ORDER — ALPRAZOLAM 0.5 MG/1
TABLET ORAL
Qty: 40 TABLET | Refills: 0 | Status: SHIPPED | OUTPATIENT
Start: 2021-11-30 | End: 2021-12-21

## 2021-12-08 ENCOUNTER — IMMUNIZATION (OUTPATIENT)
Dept: LAB | Facility: HOSPITAL | Age: 56
End: 2021-12-08
Attending: EMERGENCY MEDICINE
Payer: MEDICARE

## 2021-12-08 DIAGNOSIS — Z23 NEED FOR VACCINATION: Primary | ICD-10-CM

## 2021-12-08 PROCEDURE — 0004A SARSCOV2 VAC 30MCG/0.3ML IM: CPT

## 2021-12-21 DIAGNOSIS — F41.1 ANXIETY STATE: ICD-10-CM

## 2021-12-21 RX ORDER — ALPRAZOLAM 0.5 MG/1
TABLET ORAL
Qty: 40 TABLET | Refills: 0 | Status: SHIPPED | OUTPATIENT
Start: 2021-12-21 | End: 2022-01-15

## 2022-01-11 DIAGNOSIS — E11.59 HYPERTENSION ASSOCIATED WITH TYPE 2 DIABETES MELLITUS (HCC): ICD-10-CM

## 2022-01-11 DIAGNOSIS — F41.1 ANXIETY STATE: ICD-10-CM

## 2022-01-11 DIAGNOSIS — E11.9 DIABETES MELLITUS WITHOUT COMPLICATION (HCC): ICD-10-CM

## 2022-01-11 DIAGNOSIS — I15.2 HYPERTENSION ASSOCIATED WITH TYPE 2 DIABETES MELLITUS (HCC): ICD-10-CM

## 2022-01-11 RX ORDER — ALPRAZOLAM 0.5 MG/1
TABLET ORAL
Qty: 40 TABLET | Refills: 0 | OUTPATIENT
Start: 2022-01-11

## 2022-01-11 RX ORDER — HYDROCHLOROTHIAZIDE 25 MG/1
25 TABLET ORAL DAILY
Qty: 30 TABLET | Refills: 0 | Status: SHIPPED | OUTPATIENT
Start: 2022-01-11 | End: 2022-01-31

## 2022-01-11 RX ORDER — PAROXETINE HYDROCHLORIDE 40 MG/1
40 TABLET, FILM COATED ORAL EVERY MORNING
Qty: 30 TABLET | Refills: 0 | Status: SHIPPED | OUTPATIENT
Start: 2022-01-11 | End: 2022-01-31

## 2022-01-11 RX ORDER — ROSUVASTATIN CALCIUM 10 MG/1
10 TABLET, COATED ORAL NIGHTLY
Qty: 90 TABLET | Refills: 1 | OUTPATIENT
Start: 2022-01-11

## 2022-01-11 RX ORDER — GLIMEPIRIDE 4 MG/1
4 TABLET ORAL
Qty: 30 TABLET | Refills: 0 | Status: SHIPPED | OUTPATIENT
Start: 2022-01-11 | End: 2022-01-31

## 2022-01-11 RX ORDER — LOSARTAN POTASSIUM 100 MG/1
100 TABLET ORAL
Qty: 30 TABLET | Refills: 0 | Status: SHIPPED | OUTPATIENT
Start: 2022-01-11 | End: 2022-01-31

## 2022-01-11 NOTE — TELEPHONE ENCOUNTER
Sent Tuebora message stating pt is due for A1c lab and due for 6 month f/u.      Metformin HCl 1000 mg failed protocol due to  Diabetic Medication Protocol Failed 01/11/2022 01:22 AM   Protocol Details  HgBA1C procedure resulted in past 6 months    Last HgB

## 2022-01-15 DIAGNOSIS — F41.1 ANXIETY STATE: ICD-10-CM

## 2022-01-17 DIAGNOSIS — F41.1 ANXIETY STATE: ICD-10-CM

## 2022-01-17 RX ORDER — ALPRAZOLAM 0.5 MG/1
TABLET ORAL
Qty: 40 TABLET | Refills: 0 | OUTPATIENT
Start: 2022-01-17

## 2022-01-17 RX ORDER — ALPRAZOLAM 0.5 MG/1
TABLET ORAL
Qty: 40 TABLET | Refills: 0 | Status: SHIPPED | OUTPATIENT
Start: 2022-01-17

## 2022-01-17 NOTE — TELEPHONE ENCOUNTER
ALPRAZolam 0.5 MG Oral Tab           Possible duplicate: Oh to review recent actions on this medication    Si PO TID PRN    Disp:  40 tablet    Refills:  0    Start: 1/15/2022    Class: Normal    Non-formulary For: Anxiety state    Last ordered: 3 w

## 2022-01-31 ENCOUNTER — OFFICE VISIT (OUTPATIENT)
Dept: INTERNAL MEDICINE CLINIC | Facility: CLINIC | Age: 57
End: 2022-01-31
Payer: MEDICARE

## 2022-01-31 ENCOUNTER — OFFICE VISIT (OUTPATIENT)
Dept: RHEUMATOLOGY | Facility: CLINIC | Age: 57
End: 2022-01-31
Payer: MEDICARE

## 2022-01-31 VITALS
DIASTOLIC BLOOD PRESSURE: 70 MMHG | WEIGHT: 218 LBS | HEART RATE: 120 BPM | TEMPERATURE: 97 F | SYSTOLIC BLOOD PRESSURE: 114 MMHG | OXYGEN SATURATION: 97 % | HEIGHT: 64.5 IN | BODY MASS INDEX: 36.76 KG/M2

## 2022-01-31 VITALS
BODY MASS INDEX: 36.6 KG/M2 | TEMPERATURE: 99 F | HEIGHT: 64.5 IN | DIASTOLIC BLOOD PRESSURE: 68 MMHG | HEART RATE: 92 BPM | SYSTOLIC BLOOD PRESSURE: 116 MMHG | WEIGHT: 217 LBS | RESPIRATION RATE: 18 BRPM

## 2022-01-31 DIAGNOSIS — Z87.442 HISTORY OF KIDNEY STONES: ICD-10-CM

## 2022-01-31 DIAGNOSIS — E78.00 PURE HYPERCHOLESTEROLEMIA: ICD-10-CM

## 2022-01-31 DIAGNOSIS — K21.9 GASTROESOPHAGEAL REFLUX DISEASE WITHOUT ESOPHAGITIS: ICD-10-CM

## 2022-01-31 DIAGNOSIS — F17.200 CURRENT SMOKER: ICD-10-CM

## 2022-01-31 DIAGNOSIS — Z96.642 HISTORY OF TOTAL LEFT HIP REPLACEMENT: ICD-10-CM

## 2022-01-31 DIAGNOSIS — Z23 NEED FOR INFLUENZA VACCINATION: ICD-10-CM

## 2022-01-31 DIAGNOSIS — G43.019 INTRACTABLE MIGRAINE WITHOUT AURA AND WITHOUT STATUS MIGRAINOSUS: ICD-10-CM

## 2022-01-31 DIAGNOSIS — J41.0 SMOKERS' COUGH (HCC): ICD-10-CM

## 2022-01-31 DIAGNOSIS — E66.01 SEVERE OBESITY (BMI 35.0-39.9) WITH COMORBIDITY (HCC): Chronic | ICD-10-CM

## 2022-01-31 DIAGNOSIS — F41.1 ANXIETY STATE: ICD-10-CM

## 2022-01-31 DIAGNOSIS — H90.3 SENSORINEURAL HEARING LOSS (SNHL) OF BOTH EARS: ICD-10-CM

## 2022-01-31 DIAGNOSIS — M47.896 OTHER OSTEOARTHRITIS OF SPINE, LUMBAR REGION: ICD-10-CM

## 2022-01-31 DIAGNOSIS — M16.0 PRIMARY OSTEOARTHRITIS OF HIPS, BILATERAL: ICD-10-CM

## 2022-01-31 DIAGNOSIS — N18.31 STAGE 3A CHRONIC KIDNEY DISEASE (HCC): ICD-10-CM

## 2022-01-31 DIAGNOSIS — M17.0 PRIMARY OSTEOARTHRITIS OF BOTH KNEES: ICD-10-CM

## 2022-01-31 DIAGNOSIS — Z00.00 ENCOUNTER FOR ANNUAL HEALTH EXAMINATION: Primary | ICD-10-CM

## 2022-01-31 DIAGNOSIS — Z12.11 ENCOUNTER FOR SCREENING FECAL OCCULT BLOOD TESTING: ICD-10-CM

## 2022-01-31 DIAGNOSIS — D50.9 IRON DEFICIENCY ANEMIA, UNSPECIFIED IRON DEFICIENCY ANEMIA TYPE: ICD-10-CM

## 2022-01-31 DIAGNOSIS — E11.59 HYPERTENSION ASSOCIATED WITH TYPE 2 DIABETES MELLITUS (HCC): ICD-10-CM

## 2022-01-31 DIAGNOSIS — E11.65 TYPE 2 DIABETES MELLITUS WITH HYPERGLYCEMIA, WITHOUT LONG-TERM CURRENT USE OF INSULIN (HCC): ICD-10-CM

## 2022-01-31 DIAGNOSIS — F33.0 MILD RECURRENT MAJOR DEPRESSION (HCC): ICD-10-CM

## 2022-01-31 DIAGNOSIS — E11.9 TYPE 2 DIABETES MELLITUS WITHOUT COMPLICATION, WITHOUT LONG-TERM CURRENT USE OF INSULIN (HCC): ICD-10-CM

## 2022-01-31 DIAGNOSIS — M17.0 PRIMARY OSTEOARTHRITIS OF BOTH KNEES: Primary | ICD-10-CM

## 2022-01-31 DIAGNOSIS — I10 PRIMARY HYPERTENSION: ICD-10-CM

## 2022-01-31 DIAGNOSIS — E66.01 SEVERE OBESITY (BMI 35.0-39.9) WITH COMORBIDITY (HCC): ICD-10-CM

## 2022-01-31 DIAGNOSIS — I15.2 HYPERTENSION ASSOCIATED WITH TYPE 2 DIABETES MELLITUS (HCC): ICD-10-CM

## 2022-01-31 PROCEDURE — 3078F DIAST BP <80 MM HG: CPT | Performed by: INTERNAL MEDICINE

## 2022-01-31 PROCEDURE — 96160 PT-FOCUSED HLTH RISK ASSMT: CPT | Performed by: FAMILY MEDICINE

## 2022-01-31 PROCEDURE — 3078F DIAST BP <80 MM HG: CPT | Performed by: FAMILY MEDICINE

## 2022-01-31 PROCEDURE — 90686 IIV4 VACC NO PRSV 0.5 ML IM: CPT | Performed by: FAMILY MEDICINE

## 2022-01-31 PROCEDURE — G0008 ADMIN INFLUENZA VIRUS VAC: HCPCS | Performed by: FAMILY MEDICINE

## 2022-01-31 PROCEDURE — 3074F SYST BP LT 130 MM HG: CPT | Performed by: FAMILY MEDICINE

## 2022-01-31 PROCEDURE — 99396 PREV VISIT EST AGE 40-64: CPT | Performed by: FAMILY MEDICINE

## 2022-01-31 PROCEDURE — G0439 PPPS, SUBSEQ VISIT: HCPCS | Performed by: FAMILY MEDICINE

## 2022-01-31 PROCEDURE — 99214 OFFICE O/P EST MOD 30 MIN: CPT | Performed by: INTERNAL MEDICINE

## 2022-01-31 PROCEDURE — 3074F SYST BP LT 130 MM HG: CPT | Performed by: INTERNAL MEDICINE

## 2022-01-31 PROCEDURE — 3008F BODY MASS INDEX DOCD: CPT | Performed by: INTERNAL MEDICINE

## 2022-01-31 PROCEDURE — 3008F BODY MASS INDEX DOCD: CPT | Performed by: FAMILY MEDICINE

## 2022-01-31 RX ORDER — GLIMEPIRIDE 4 MG/1
4 TABLET ORAL
Qty: 30 TABLET | Refills: 2 | Status: SHIPPED | OUTPATIENT
Start: 2022-01-31

## 2022-01-31 RX ORDER — HYDROCODONE BITARTRATE AND ACETAMINOPHEN 10; 325 MG/1; MG/1
1-2 TABLET ORAL EVERY 4 HOURS PRN
Qty: 180 TABLET | Refills: 0 | Status: SHIPPED | OUTPATIENT
Start: 2022-01-31 | End: 2022-03-02

## 2022-01-31 RX ORDER — PAROXETINE HYDROCHLORIDE 40 MG/1
40 TABLET, FILM COATED ORAL EVERY MORNING
Qty: 30 TABLET | Refills: 2 | Status: SHIPPED | OUTPATIENT
Start: 2022-01-31

## 2022-01-31 RX ORDER — HYDROCODONE BITARTRATE AND ACETAMINOPHEN 10; 325 MG/1; MG/1
1-2 TABLET ORAL EVERY 4 HOURS PRN
Qty: 180 TABLET | Refills: 0 | Status: SHIPPED | OUTPATIENT
Start: 2022-03-02 | End: 2022-04-01

## 2022-01-31 RX ORDER — HYDROCODONE BITARTRATE AND ACETAMINOPHEN 10; 325 MG/1; MG/1
1-2 TABLET ORAL EVERY 4 HOURS PRN
Qty: 180 TABLET | Refills: 0 | Status: SHIPPED | OUTPATIENT
Start: 2022-04-01 | End: 2022-05-01

## 2022-01-31 RX ORDER — HYDROCHLOROTHIAZIDE 25 MG/1
25 TABLET ORAL DAILY
Qty: 30 TABLET | Refills: 2 | Status: SHIPPED | OUTPATIENT
Start: 2022-01-31

## 2022-01-31 RX ORDER — LOSARTAN POTASSIUM 100 MG/1
100 TABLET ORAL
Qty: 30 TABLET | Refills: 2 | Status: SHIPPED | OUTPATIENT
Start: 2022-01-31

## 2022-01-31 NOTE — PATIENT INSTRUCTIONS
Norco 10 mg every 4 hours as needed for pain. Diclofenac one twice a day as needed. Use heat as needed   Return to office 3 months.

## 2022-01-31 NOTE — PATIENT INSTRUCTIONS
Srini Berkowitz's SCREENING SCHEDULE   Tests on this list are recommended by your physician but may not be covered, or covered at this frequency, by your insurer. Please check with your insurance carrier before scheduling to verify coverage.    Jann Russell any previous visit. No recommendations at this time   Pap and Pelvic    Pap   Covered every 2 years for women at normal risk;  Annually if at high risk 08/29/2019  Pap Smear,3 Years due on 08/29/2022    Chlamydia Annually if high risk -  No recommendat K 3.8 09/07/2021         Creatinine   Annually Lab Results   Component Value Date    CREATSERUM 1.40 (H) 09/07/2021         BUN Annually Lab Results   Component Value Date    BUN 24 (H) 09/07/2021       Drug Serum Conc Annually No results found for: DIG -    Colonoscopy Never done    Flexible Sigmoidoscopy   Covered every 4 years -    Fecal Occult Blood Test Covered annually -   Bone Density Screening    Bone density screening    Covered every 2 years after age 72 if diagnosed with risk of osteoporosis or ratio Annually Lab Results   Component Value Date    MICROALBCREA 18.7 06/28/2021    MALBCRECALC 5.2 08/09/2018       LDL Annually Lab Results   Component Value Date    LDL 58 09/07/2021       Dilated Eye Exam Annually 09/22/2021       Annual Monitoring of

## 2022-01-31 NOTE — PROGRESS NOTES
Subjective:   Leonora Blevins is a 60year old female who presents for a MA (Medicare Advantage) Supervisit (Once per calendar year). Leonora Blevins is a 60year old female who presents for recheck of her diabetes. Patient’s FBS have been 's.   P difficulties based on screening of functional status. She has Meal Preparation difficulties based on screening of functional status. She has difficulties Shopping for Groceries based on screening of functional status.  She has Hearing problems based on scree ([address]    Allergies:  She is allergic to etodolac. Current Medications:  metFORMIN HCl 1000 MG Oral Tab, Take 1 tablet (1,000 mg total) by mouth 2 (two) times daily with meals.   glimepiride 4 MG Oral Tab, Take 1 tablet (4 mg total) by mouth every morning drugs.     Tobacco:  She currently smokes tobacco.  Social History    Tobacco Use      Smoking status: Current Every Day Smoker        Packs/day: 0.50        Types: Cigarettes      Smokeless tobacco: Never Used      Tobacco comment: Smokes 6 cigarettes nikki blurred vision or double vision  HEENT: denies nasal congestion, sinus pain or ST  LUNGS: denies shortness of breath with exertion, smoker/vapes  CARDIOVASCULAR: denies chest pain on exertion,+HTN  GI: denies abdominal pain, denies heartburn  : denies dy (1.638 m)   Wt 217 lb (98.4 kg)   LMP 11/08/2015 (Within Days)   BMI 36.67 kg/m²  Estimated body mass index is 36.67 kg/m² as calculated from the following:    Height as of this encounter: 5' 4.5\" (1.638 m).     Weight as of this encounter: 217 lb (98.4 kg comorbidity ([address]  12. Sensorineural hearing loss (SNHL) of both ears  13. Primary hypertension  14. Iron deficiency anemia, unspecified iron deficiency anemia type  15.  Type 2 diabetes mellitus with hyperglycemia, without long-term current use of insulin help protect her kidneys. Recheck CMP    6. Encounter for annual health examination  - overall stable. Pt to f/u in one year for her annual exam    7. History of total left hip replacement  -managed by Dr. Wil Amin    8.  History of kidney stones  - Pt to d smoking with Pt. Pt is trying to cut down using her vape pen. Pt is not ready to totally stop. 17. Primary osteoarthritis of both knees  -managed by Dr. Madiha Wade and Dr. Dedrick Staley    18.  Anxiety state  -stable, continue medication  - PARoxetine HCl 40 MG O home or elsewhere?: No  Have you had any immunizations at another office such as Influenza, Hepatitis B, Tetanus, or Pneumococcal?: No       Mary Berkowitz's SCREENING SCHEDULE   Tests on this list are recommended by your physician but may not be covered, osteoporosis or estrogen deficiency. Covered yearly for long-term glucocorticoid medication use (Steroids) No results found for this or any previous visit.       No recommendations at this time   Pap and Pelvic    Pap   Covered every 2 years for women at Monitoring of Persistent Medications (ACE/ARB, digoxin diuretics, anticonvulsants)    Potassium Annually Lab Results   Component Value Date    K 3.8 09/07/2021         Creatinine   Annually Lab Results   Component Value Date    CREATSERUM 1.40 (H) 09/07/20

## 2022-01-31 NOTE — PROGRESS NOTES
[facility]  Dr. Alvina Velazquez Progress Note     Subjective:   Jorge Nam is a(n) 60year old female. Current complaints: Patient presents with:   Follow - Up: LOV 11-2-21; pt here reports bilat knee increasing pain; Norco 1-2 q 4h helps with pain; rep

## 2022-02-14 RX ORDER — ALPRAZOLAM 0.5 MG/1
TABLET ORAL
Qty: 40 TABLET | Refills: 0 | Status: SHIPPED | OUTPATIENT
Start: 2022-02-14 | End: 2022-03-08

## 2022-02-14 RX ORDER — ALPRAZOLAM 0.5 MG/1
TABLET ORAL
Qty: 40 TABLET | Refills: 0 | OUTPATIENT
Start: 2022-02-14

## 2022-03-02 DIAGNOSIS — F41.1 ANXIETY STATE: ICD-10-CM

## 2022-03-03 RX ORDER — ALPRAZOLAM 0.5 MG/1
TABLET ORAL
Qty: 40 TABLET | Refills: 0 | OUTPATIENT
Start: 2022-03-03

## 2022-03-04 DIAGNOSIS — F41.1 ANXIETY STATE: ICD-10-CM

## 2022-03-05 RX ORDER — ALPRAZOLAM 0.5 MG/1
TABLET ORAL
Qty: 40 TABLET | Refills: 0 | OUTPATIENT
Start: 2022-03-05

## 2022-03-07 DIAGNOSIS — F41.1 ANXIETY STATE: ICD-10-CM

## 2022-03-07 RX ORDER — ALPRAZOLAM 0.5 MG/1
TABLET ORAL
Qty: 40 TABLET | Refills: 0 | OUTPATIENT
Start: 2022-03-07

## 2022-03-08 RX ORDER — ALPRAZOLAM 0.5 MG/1
TABLET ORAL
Qty: 40 TABLET | Refills: 0 | Status: SHIPPED | OUTPATIENT
Start: 2022-03-08 | End: 2022-03-31

## 2022-03-28 ENCOUNTER — LAB ENCOUNTER (OUTPATIENT)
Dept: LAB | Age: 57
End: 2022-03-28
Attending: FAMILY MEDICINE
Payer: MEDICARE

## 2022-03-28 DIAGNOSIS — E11.9 DIABETES MELLITUS WITHOUT COMPLICATION (HCC): ICD-10-CM

## 2022-03-28 LAB
EST. AVERAGE GLUCOSE BLD GHB EST-MCNC: 151 MG/DL (ref 68–126)
HBA1C MFR BLD: 6.9 % (ref ?–5.7)

## 2022-03-28 PROCEDURE — 3044F HG A1C LEVEL LT 7.0%: CPT | Performed by: FAMILY MEDICINE

## 2022-03-28 PROCEDURE — 83036 HEMOGLOBIN GLYCOSYLATED A1C: CPT

## 2022-03-28 PROCEDURE — 36415 COLL VENOUS BLD VENIPUNCTURE: CPT

## 2022-04-01 RX ORDER — ALPRAZOLAM 0.5 MG/1
TABLET ORAL
Qty: 40 TABLET | Refills: 0 | Status: SHIPPED | OUTPATIENT
Start: 2022-04-01

## 2022-04-23 DIAGNOSIS — F41.1 ANXIETY STATE: ICD-10-CM

## 2022-04-24 RX ORDER — ALPRAZOLAM 0.5 MG/1
TABLET ORAL
Qty: 40 TABLET | Refills: 0 | Status: SHIPPED | OUTPATIENT
Start: 2022-04-24

## 2022-04-24 RX ORDER — ALPRAZOLAM 0.5 MG/1
TABLET ORAL
Qty: 40 TABLET | Refills: 0 | OUTPATIENT
Start: 2022-04-24

## 2022-04-27 ENCOUNTER — OFFICE VISIT (OUTPATIENT)
Dept: RHEUMATOLOGY | Facility: CLINIC | Age: 57
End: 2022-04-27
Payer: MEDICARE

## 2022-04-27 ENCOUNTER — TELEPHONE (OUTPATIENT)
Dept: INTERNAL MEDICINE CLINIC | Facility: CLINIC | Age: 57
End: 2022-04-27

## 2022-04-27 VITALS
SYSTOLIC BLOOD PRESSURE: 140 MMHG | BODY MASS INDEX: 35.85 KG/M2 | WEIGHT: 210 LBS | OXYGEN SATURATION: 98 % | HEART RATE: 113 BPM | HEIGHT: 64 IN | TEMPERATURE: 98 F | DIASTOLIC BLOOD PRESSURE: 86 MMHG

## 2022-04-27 DIAGNOSIS — M17.0 PRIMARY OSTEOARTHRITIS OF BOTH KNEES: ICD-10-CM

## 2022-04-27 DIAGNOSIS — S06.0X0A CONCUSSION WITHOUT LOSS OF CONSCIOUSNESS, INITIAL ENCOUNTER: ICD-10-CM

## 2022-04-27 DIAGNOSIS — M47.896 OTHER OSTEOARTHRITIS OF SPINE, LUMBAR REGION: ICD-10-CM

## 2022-04-27 DIAGNOSIS — M48.02 STENOSIS OF CERVICAL SPINE: Primary | ICD-10-CM

## 2022-04-27 DIAGNOSIS — Z96.642 HISTORY OF TOTAL LEFT HIP REPLACEMENT: ICD-10-CM

## 2022-04-27 PROBLEM — I10 PRIMARY HYPERTENSION: Status: RESOLVED | Noted: 2019-08-20 | Resolved: 2022-04-27

## 2022-04-27 PROCEDURE — 3079F DIAST BP 80-89 MM HG: CPT | Performed by: INTERNAL MEDICINE

## 2022-04-27 PROCEDURE — 3077F SYST BP >= 140 MM HG: CPT | Performed by: INTERNAL MEDICINE

## 2022-04-27 PROCEDURE — 99214 OFFICE O/P EST MOD 30 MIN: CPT | Performed by: INTERNAL MEDICINE

## 2022-04-27 PROCEDURE — 3008F BODY MASS INDEX DOCD: CPT | Performed by: INTERNAL MEDICINE

## 2022-04-27 RX ORDER — HYDROCODONE BITARTRATE AND ACETAMINOPHEN 10; 325 MG/1; MG/1
1-2 TABLET ORAL EVERY 4 HOURS PRN
Qty: 180 TABLET | Refills: 0 | Status: SHIPPED | OUTPATIENT
Start: 2022-04-30 | End: 2022-05-30

## 2022-04-27 RX ORDER — ONDANSETRON 4 MG/1
4 TABLET, ORALLY DISINTEGRATING ORAL EVERY 8 HOURS PRN
Qty: 30 TABLET | Refills: 1 | Status: SHIPPED | OUTPATIENT
Start: 2022-04-27

## 2022-04-27 RX ORDER — HYDROCODONE BITARTRATE AND ACETAMINOPHEN 10; 325 MG/1; MG/1
1-2 TABLET ORAL EVERY 4 HOURS PRN
Qty: 180 TABLET | Refills: 0 | Status: SHIPPED | OUTPATIENT
Start: 2022-05-30 | End: 2022-06-29

## 2022-04-27 RX ORDER — HYDROCODONE BITARTRATE AND ACETAMINOPHEN 10; 325 MG/1; MG/1
1-2 TABLET ORAL EVERY 4 HOURS PRN
Qty: 180 TABLET | Refills: 0 | Status: SHIPPED | OUTPATIENT
Start: 2022-06-29 | End: 2022-07-29

## 2022-04-27 NOTE — PATIENT INSTRUCTIONS
Continue use of Norco for pain relief 10 mg 1 every 4-6 hours as needed. CT scan of the neck done yesterday shows cervical spinal stenosis at levels C5-6 and C6-7. Also you can use diclofenac 75 mg twice a day. Also use a heating pad if that helps with your neck pain. Do mild stretching for the neck. Your CAT scan of the head done yesterday does show some soft tissue swelling right side of the head,   which you hit when you fell. You can apply ice to this area. Use the Zofran melt tablets 1 tablet as needed when there is nausea. Return to office for recheck 3 months.

## 2022-04-27 NOTE — TELEPHONE ENCOUNTER
Incoming fax from [facility]    Patients CT Cervical Spine w/o contrast    Placed in  in basket for review

## 2022-04-29 ENCOUNTER — TELEPHONE (OUTPATIENT)
Dept: INTERNAL MEDICINE CLINIC | Facility: CLINIC | Age: 57
End: 2022-04-29

## 2022-05-09 ENCOUNTER — TELEPHONE (OUTPATIENT)
Dept: INTERNAL MEDICINE CLINIC | Facility: CLINIC | Age: 57
End: 2022-05-09

## 2022-05-09 RX ORDER — ROSUVASTATIN CALCIUM 10 MG/1
TABLET, COATED ORAL
Qty: 90 TABLET | Refills: 1 | Status: SHIPPED | OUTPATIENT
Start: 2022-05-09

## 2022-05-09 RX ORDER — HYDROCHLOROTHIAZIDE 25 MG/1
TABLET ORAL
Qty: 90 TABLET | Refills: 1 | Status: SHIPPED | OUTPATIENT
Start: 2022-05-09

## 2022-05-09 RX ORDER — LOSARTAN POTASSIUM 100 MG/1
TABLET ORAL
Qty: 90 TABLET | Refills: 1 | Status: SHIPPED | OUTPATIENT
Start: 2022-05-09

## 2022-05-09 RX ORDER — GLIMEPIRIDE 4 MG/1
TABLET ORAL
Qty: 90 TABLET | Refills: 1 | Status: SHIPPED | OUTPATIENT
Start: 2022-05-09

## 2022-05-09 NOTE — TELEPHONE ENCOUNTER
Glimepiride 4 mg  Filled 1-31-22  Qty 30  2 refills  No upcoming appt. LOV 1-31-22  Labs: 3-28-22: A1c/ 6-28-21: Micoralb./Creat.    hydrochlorothiazide 25 mg  Filled 1-31-22  Qty 30  2 refills  No upcoming appt. LOV 1-31-22    Losartan 100 mg  Filled 1-31-22  Qty 30  2 refills  No upcoming appt. LOV 1-31-22    Metformin HCl 1000 mg  Filled 1-31-22  Qty 60  2 refills  No upcoming appt. LOV 1-31-22  Labs: 3-28-22: A1c/ 6-28-21: Micoralb./Creat. Rosuvastatin 10 mg  Filled 11-8-21  Qty 90  1 refill  No upcoming appt.    LOV 1-31-22  Labs: 9-7-21: Lipid

## 2022-05-09 NOTE — TELEPHONE ENCOUNTER
Incoming fax from [facility]    Patients ER report from 4/26/2022     Placed in  in basket for review

## 2022-05-16 RX ORDER — ALPRAZOLAM 0.5 MG/1
TABLET ORAL
Qty: 40 TABLET | Refills: 0 | Status: SHIPPED | OUTPATIENT
Start: 2022-05-16

## 2022-06-05 DIAGNOSIS — F41.1 ANXIETY STATE: ICD-10-CM

## 2022-06-06 DIAGNOSIS — F41.1 ANXIETY STATE: ICD-10-CM

## 2022-06-06 RX ORDER — ALPRAZOLAM 0.5 MG/1
TABLET ORAL
Qty: 40 TABLET | Refills: 0 | Status: SHIPPED | OUTPATIENT
Start: 2022-06-06

## 2022-06-06 RX ORDER — ALPRAZOLAM 0.5 MG/1
TABLET ORAL
Qty: 40 TABLET | Refills: 0 | OUTPATIENT
Start: 2022-06-06

## 2022-06-25 DIAGNOSIS — F41.1 ANXIETY STATE: ICD-10-CM

## 2022-06-26 RX ORDER — ALPRAZOLAM 0.5 MG/1
TABLET ORAL
Qty: 40 TABLET | Refills: 0 | OUTPATIENT
Start: 2022-06-26

## 2022-06-27 DIAGNOSIS — F41.1 ANXIETY STATE: ICD-10-CM

## 2022-06-27 RX ORDER — ALPRAZOLAM 0.5 MG/1
TABLET ORAL
Qty: 40 TABLET | Refills: 0 | OUTPATIENT
Start: 2022-06-27

## 2022-06-28 ENCOUNTER — PATIENT MESSAGE (OUTPATIENT)
Dept: INTERNAL MEDICINE CLINIC | Facility: CLINIC | Age: 57
End: 2022-06-28

## 2022-06-28 DIAGNOSIS — F41.1 ANXIETY STATE: ICD-10-CM

## 2022-06-28 RX ORDER — ALPRAZOLAM 0.5 MG/1
TABLET ORAL
Qty: 40 TABLET | Refills: 0 | Status: SHIPPED | OUTPATIENT
Start: 2022-06-28

## 2022-06-28 RX ORDER — ALPRAZOLAM 0.5 MG/1
TABLET ORAL
Qty: 40 TABLET | Refills: 0 | OUTPATIENT
Start: 2022-06-28

## 2022-06-28 NOTE — TELEPHONE ENCOUNTER
From: Jarrell Berkowitz  To: Louisville Poster, APRN  Sent: 6/28/2022 10:25 AM CDT  Subject: Xanax refill    Hi Shona:    Could you please refill my Xanax as I will be going to the Pharmacy tomorrow to  other meds for my family. Thank you.     Angle Atkinson

## 2022-07-17 DIAGNOSIS — F41.1 ANXIETY STATE: ICD-10-CM

## 2022-07-17 RX ORDER — ALPRAZOLAM 0.5 MG/1
TABLET ORAL
Qty: 40 TABLET | Refills: 0 | Status: CANCELLED | OUTPATIENT
Start: 2022-07-17

## 2022-07-18 DIAGNOSIS — F41.1 ANXIETY STATE: ICD-10-CM

## 2022-07-18 RX ORDER — ALPRAZOLAM 0.5 MG/1
TABLET ORAL
Qty: 40 TABLET | Refills: 0 | OUTPATIENT
Start: 2022-07-18

## 2022-07-18 NOTE — TELEPHONE ENCOUNTER
Duplicate request. Waiting on provider     Alprazolam 0.5 mg  Filled 6-28-22  Qty 40  0 refills  No upcoming appt.   LOV 1-31-22

## 2022-07-25 ENCOUNTER — OFFICE VISIT (OUTPATIENT)
Dept: RHEUMATOLOGY | Facility: CLINIC | Age: 57
End: 2022-07-25
Payer: MEDICARE

## 2022-07-25 VITALS
WEIGHT: 212 LBS | TEMPERATURE: 98 F | BODY MASS INDEX: 36 KG/M2 | HEART RATE: 106 BPM | DIASTOLIC BLOOD PRESSURE: 70 MMHG | OXYGEN SATURATION: 96 % | SYSTOLIC BLOOD PRESSURE: 118 MMHG

## 2022-07-25 DIAGNOSIS — Z96.642 HISTORY OF TOTAL LEFT HIP REPLACEMENT: ICD-10-CM

## 2022-07-25 DIAGNOSIS — M16.11 PRIMARY OSTEOARTHRITIS OF RIGHT HIP: ICD-10-CM

## 2022-07-25 DIAGNOSIS — M48.02 STENOSIS OF CERVICAL SPINE: ICD-10-CM

## 2022-07-25 DIAGNOSIS — M47.896 OTHER OSTEOARTHRITIS OF SPINE, LUMBAR REGION: ICD-10-CM

## 2022-07-25 DIAGNOSIS — M17.0 PRIMARY OSTEOARTHRITIS OF BOTH KNEES: Primary | ICD-10-CM

## 2022-07-25 PROCEDURE — 3078F DIAST BP <80 MM HG: CPT | Performed by: INTERNAL MEDICINE

## 2022-07-25 PROCEDURE — 99214 OFFICE O/P EST MOD 30 MIN: CPT | Performed by: INTERNAL MEDICINE

## 2022-07-25 PROCEDURE — 3074F SYST BP LT 130 MM HG: CPT | Performed by: INTERNAL MEDICINE

## 2022-07-25 RX ORDER — HYDROCODONE BITARTRATE AND ACETAMINOPHEN 10; 325 MG/1; MG/1
1-2 TABLET ORAL EVERY 4 HOURS PRN
Qty: 180 TABLET | Refills: 0 | Status: SHIPPED | OUTPATIENT
Start: 2022-08-27 | End: 2022-09-26

## 2022-07-25 RX ORDER — HYDROCODONE BITARTRATE AND ACETAMINOPHEN 10; 325 MG/1; MG/1
1-2 TABLET ORAL EVERY 4 HOURS PRN
Qty: 180 TABLET | Refills: 0 | Status: SHIPPED | OUTPATIENT
Start: 2022-09-26 | End: 2022-10-26

## 2022-07-25 RX ORDER — HYDROCODONE BITARTRATE AND ACETAMINOPHEN 10; 325 MG/1; MG/1
1-2 TABLET ORAL EVERY 4 HOURS PRN
Qty: 180 TABLET | Refills: 0 | Status: SHIPPED | OUTPATIENT
Start: 2022-07-28 | End: 2022-08-27

## 2022-07-25 NOTE — PATIENT INSTRUCTIONS
Norco 10 mg as needed every 4 hours for pain. If knees are acting up see Dr. Chelsea Maier. Exercise as best you can. Return to office 6 months.

## 2022-07-26 DIAGNOSIS — F41.1 ANXIETY STATE: ICD-10-CM

## 2022-07-26 RX ORDER — ALPRAZOLAM 0.5 MG/1
TABLET ORAL
Qty: 40 TABLET | Refills: 0 | OUTPATIENT
Start: 2022-07-26

## 2022-07-26 RX ORDER — ALPRAZOLAM 0.5 MG/1
TABLET ORAL
Qty: 40 TABLET | Refills: 0 | Status: SHIPPED | OUTPATIENT
Start: 2022-07-26

## 2022-08-08 RX ORDER — ONDANSETRON 4 MG/1
TABLET, ORALLY DISINTEGRATING ORAL
Qty: 30 TABLET | Refills: 0 | Status: SHIPPED | OUTPATIENT
Start: 2022-08-08

## 2022-08-17 ENCOUNTER — OFFICE VISIT (OUTPATIENT)
Dept: INTERNAL MEDICINE CLINIC | Facility: CLINIC | Age: 57
End: 2022-08-17
Payer: MEDICARE

## 2022-08-17 VITALS
BODY MASS INDEX: 36.37 KG/M2 | SYSTOLIC BLOOD PRESSURE: 124 MMHG | RESPIRATION RATE: 20 BRPM | TEMPERATURE: 99 F | WEIGHT: 213 LBS | HEART RATE: 96 BPM | DIASTOLIC BLOOD PRESSURE: 66 MMHG | HEIGHT: 64 IN

## 2022-08-17 DIAGNOSIS — E11.65 TYPE 2 DIABETES MELLITUS WITH HYPERGLYCEMIA, WITHOUT LONG-TERM CURRENT USE OF INSULIN (HCC): Primary | ICD-10-CM

## 2022-08-17 DIAGNOSIS — E78.00 PURE HYPERCHOLESTEROLEMIA: ICD-10-CM

## 2022-08-17 DIAGNOSIS — E55.9 VITAMIN D DEFICIENCY: ICD-10-CM

## 2022-08-17 DIAGNOSIS — F41.1 ANXIETY STATE: ICD-10-CM

## 2022-08-17 DIAGNOSIS — M79.10 MYALGIA, UNSPECIFIED SITE: ICD-10-CM

## 2022-08-17 DIAGNOSIS — I10 ESSENTIAL HYPERTENSION, BENIGN: ICD-10-CM

## 2022-08-17 DIAGNOSIS — Z12.31 ENCOUNTER FOR SCREENING MAMMOGRAM FOR BREAST CANCER: ICD-10-CM

## 2022-08-17 PROCEDURE — 3074F SYST BP LT 130 MM HG: CPT | Performed by: FAMILY MEDICINE

## 2022-08-17 PROCEDURE — 3078F DIAST BP <80 MM HG: CPT | Performed by: FAMILY MEDICINE

## 2022-08-17 PROCEDURE — 3008F BODY MASS INDEX DOCD: CPT | Performed by: FAMILY MEDICINE

## 2022-08-17 PROCEDURE — 99214 OFFICE O/P EST MOD 30 MIN: CPT | Performed by: FAMILY MEDICINE

## 2022-08-17 RX ORDER — METHOCARBAMOL 500 MG/1
500 TABLET, FILM COATED ORAL 3 TIMES DAILY
Qty: 30 TABLET | Refills: 0 | Status: SHIPPED | OUTPATIENT
Start: 2022-08-17

## 2022-08-24 ENCOUNTER — HOSPITAL ENCOUNTER (OUTPATIENT)
Dept: MAMMOGRAPHY | Age: 57
Discharge: HOME | End: 2022-08-24
Attending: FAMILY MEDICINE
Payer: MEDICARE

## 2022-08-24 ENCOUNTER — LAB ENCOUNTER (OUTPATIENT)
Dept: LAB | Age: 57
End: 2022-08-24
Attending: FAMILY MEDICINE
Payer: MEDICARE

## 2022-08-24 ENCOUNTER — TELEPHONE (OUTPATIENT)
Dept: INTERNAL MEDICINE CLINIC | Facility: CLINIC | Age: 57
End: 2022-08-24

## 2022-08-24 DIAGNOSIS — E55.9 VITAMIN D DEFICIENCY: Primary | ICD-10-CM

## 2022-08-24 DIAGNOSIS — Z12.31 ENCOUNTER FOR SCREENING MAMMOGRAM FOR BREAST CANCER: ICD-10-CM

## 2022-08-24 DIAGNOSIS — E78.00 PURE HYPERCHOLESTEROLEMIA: ICD-10-CM

## 2022-08-24 DIAGNOSIS — F41.1 ANXIETY STATE: ICD-10-CM

## 2022-08-24 LAB
ALBUMIN SERPL-MCNC: 3.9 G/DL (ref 3.4–5)
ALBUMIN/GLOB SERPL: 1 {RATIO} (ref 1–2)
ALP LIVER SERPL-CCNC: 57 U/L
ALT SERPL-CCNC: 19 U/L
ANION GAP SERPL CALC-SCNC: 7 MMOL/L (ref 0–18)
AST SERPL-CCNC: 28 U/L (ref 15–37)
BILIRUB SERPL-MCNC: 0.5 MG/DL (ref 0.1–2)
BUN BLD-MCNC: 23 MG/DL (ref 7–18)
CALCIUM BLD-MCNC: 9.5 MG/DL (ref 8.5–10.1)
CHLORIDE SERPL-SCNC: 103 MMOL/L (ref 98–112)
CHOLEST SERPL-MCNC: 112 MG/DL (ref ?–200)
CO2 SERPL-SCNC: 27 MMOL/L (ref 21–32)
CREAT BLD-MCNC: 1.34 MG/DL
CREAT UR-SCNC: 163 MG/DL
FASTING PATIENT LIPID ANSWER: YES
FASTING STATUS PATIENT QL REPORTED: YES
GFR SERPLBLD BASED ON 1.73 SQ M-ARVRAT: 46 ML/MIN/1.73M2 (ref 60–?)
GLOBULIN PLAS-MCNC: 3.8 G/DL (ref 2.8–4.4)
GLUCOSE BLD-MCNC: 133 MG/DL (ref 70–99)
HDLC SERPL-MCNC: 34 MG/DL (ref 40–59)
LDLC SERPL CALC-MCNC: 39 MG/DL (ref ?–100)
MICROALBUMIN UR-MCNC: 6.85 MG/DL
MICROALBUMIN/CREAT 24H UR-RTO: 42 UG/MG (ref ?–30)
NONHDLC SERPL-MCNC: 78 MG/DL (ref ?–130)
OSMOLALITY SERPL CALC.SUM OF ELEC: 290 MOSM/KG (ref 275–295)
POTASSIUM SERPL-SCNC: 3.6 MMOL/L (ref 3.5–5.1)
PROT SERPL-MCNC: 7.7 G/DL (ref 6.4–8.2)
SODIUM SERPL-SCNC: 137 MMOL/L (ref 136–145)
TRIGL SERPL-MCNC: 249 MG/DL (ref 30–149)
TSI SER-ACNC: 1.87 MIU/ML (ref 0.36–3.74)
VIT D+METAB SERPL-MCNC: 19 NG/ML (ref 30–100)
VLDLC SERPL CALC-MCNC: 34 MG/DL (ref 0–30)

## 2022-08-24 PROCEDURE — 82570 ASSAY OF URINE CREATININE: CPT | Performed by: FAMILY MEDICINE

## 2022-08-24 PROCEDURE — 3061F NEG MICROALBUMINURIA REV: CPT | Performed by: FAMILY MEDICINE

## 2022-08-24 PROCEDURE — 3060F POS MICROALBUMINURIA REV: CPT | Performed by: FAMILY MEDICINE

## 2022-08-24 PROCEDURE — 82306 VITAMIN D 25 HYDROXY: CPT | Performed by: FAMILY MEDICINE

## 2022-08-24 PROCEDURE — 80061 LIPID PANEL: CPT | Performed by: FAMILY MEDICINE

## 2022-08-24 PROCEDURE — 84443 ASSAY THYROID STIM HORMONE: CPT | Performed by: FAMILY MEDICINE

## 2022-08-24 PROCEDURE — 77063 BREAST TOMOSYNTHESIS BI: CPT | Performed by: FAMILY MEDICINE

## 2022-08-24 PROCEDURE — 83036 HEMOGLOBIN GLYCOSYLATED A1C: CPT | Performed by: FAMILY MEDICINE

## 2022-08-24 PROCEDURE — 3051F HG A1C>EQUAL 7.0%<8.0%: CPT | Performed by: FAMILY MEDICINE

## 2022-08-24 PROCEDURE — 82043 UR ALBUMIN QUANTITATIVE: CPT | Performed by: FAMILY MEDICINE

## 2022-08-24 PROCEDURE — 36415 COLL VENOUS BLD VENIPUNCTURE: CPT | Performed by: FAMILY MEDICINE

## 2022-08-24 PROCEDURE — 77067 SCR MAMMO BI INCL CAD: CPT | Performed by: FAMILY MEDICINE

## 2022-08-24 PROCEDURE — 80053 COMPREHEN METABOLIC PANEL: CPT | Performed by: FAMILY MEDICINE

## 2022-08-24 RX ORDER — ERGOCALCIFEROL 1.25 MG/1
50000 CAPSULE ORAL WEEKLY
Qty: 12 CAPSULE | Refills: 1 | Status: SHIPPED | OUTPATIENT
Start: 2022-08-24

## 2022-08-24 NOTE — TELEPHONE ENCOUNTER
----- Message from HEAVENLY Rossi sent at 8/24/2022  2:29 PM CDT -----  Patient has Vit D deficiency. Pt needs vit D 79230 IU once a week for 6 months, after 2000 IU daily!!!. Recheck vit D in 6 months. Microalbum urine- up a little. Pt to work on keeping her glucose levels under control and BP under control. Kidney functions stable. Trigs up, watch the saturated fats in the diet, and continue chol med. Recheck 6 months.   TSH wnl

## 2022-08-24 NOTE — TELEPHONE ENCOUNTER
Pt was notified - see result note. Prescription for ergocalciferol placed, follow up lab orders for vitamin D and lipids placed.

## 2022-08-24 NOTE — TELEPHONE ENCOUNTER
LOV: 8/17/2022 with HEAVENLY Mark  RTC: 6 months  Last Relevant Labs: TODAY (8/24/2022)  Filled: 7/26/2022    #40 with 0 refills    Future Appointments   Date Time Provider Larisa Jamesi   24/15/3069 94:73 AM Luci Goldberg MD [facility] [place]

## 2022-08-25 ENCOUNTER — TELEPHONE (OUTPATIENT)
Dept: INTERNAL MEDICINE CLINIC | Facility: CLINIC | Age: 57
End: 2022-08-25

## 2022-08-25 DIAGNOSIS — E11.65 TYPE 2 DIABETES MELLITUS WITH HYPERGLYCEMIA, WITHOUT LONG-TERM CURRENT USE OF INSULIN (HCC): Primary | ICD-10-CM

## 2022-08-25 RX ORDER — ALPRAZOLAM 0.5 MG/1
TABLET ORAL
Qty: 40 TABLET | Refills: 0 | Status: SHIPPED | OUTPATIENT
Start: 2022-08-25

## 2022-08-25 NOTE — TELEPHONE ENCOUNTER
----- Message from HEAVENLY Maria sent at 8/25/2022  1:26 PM CDT -----  HGBA1C is  creeping up. Pt needs to work on her diet. Recheck in 6 months.

## 2022-09-13 RX ORDER — ONDANSETRON 4 MG/1
TABLET, ORALLY DISINTEGRATING ORAL
Qty: 30 TABLET | Refills: 0 | Status: SHIPPED | OUTPATIENT
Start: 2022-09-13

## 2022-09-13 NOTE — TELEPHONE ENCOUNTER
Future Appointments   Date Time Provider Larisa Jamesi   37/07/9249 17:80 AM Marcell Shields MD [facility]     LOV 7/25/22  RTO in 6mo

## 2022-09-22 DIAGNOSIS — F41.1 ANXIETY STATE: ICD-10-CM

## 2022-09-22 RX ORDER — ALPRAZOLAM 0.5 MG/1
TABLET ORAL
Qty: 40 TABLET | Refills: 0 | Status: CANCELLED | OUTPATIENT
Start: 2022-09-22

## 2022-09-23 ENCOUNTER — TELEPHONE (OUTPATIENT)
Dept: INTERNAL MEDICINE CLINIC | Facility: CLINIC | Age: 57
End: 2022-09-23

## 2022-09-23 DIAGNOSIS — Z23 NEED FOR TDAP VACCINATION: Primary | ICD-10-CM

## 2022-09-23 RX ORDER — ALPRAZOLAM 0.5 MG/1
TABLET ORAL
Qty: 40 TABLET | Refills: 0 | OUTPATIENT
Start: 2022-09-23

## 2022-09-23 NOTE — TELEPHONE ENCOUNTER
Pt scheduled for tetanus shot for Monday 9/26. Can we get order placed?      Future Appointments   Date Time Provider Larisa Diallo   9/26/2022 11:00 AM EMG 08 NURSE EMG 8 [facility] [place]   92/81/4502 38:78 AM Anne Bernal MD [facility]ier[facility]

## 2022-10-02 DIAGNOSIS — F33.0 MILD RECURRENT MAJOR DEPRESSION (HCC): ICD-10-CM

## 2022-10-02 DIAGNOSIS — F41.1 ANXIETY STATE: ICD-10-CM

## 2022-10-03 RX ORDER — PAROXETINE HYDROCHLORIDE 40 MG/1
40 TABLET, FILM COATED ORAL EVERY MORNING
Qty: 30 TABLET | Refills: 3 | Status: SHIPPED | OUTPATIENT
Start: 2022-10-03 | End: 2023-02-06

## 2022-10-03 NOTE — TELEPHONE ENCOUNTER
Paroxetine 40 mg  Filled 1-31-22  Qty 30  2 refills  No upcoming appt.   LOV 8-17-22 SM The infant continues working on breastfeeding, her mother's independent with positioning, and latch verified.  Bath completed in room and education reviewed.  Discharge expected in the AM

## 2022-10-14 RX ORDER — ONDANSETRON 4 MG/1
TABLET, ORALLY DISINTEGRATING ORAL
Qty: 30 TABLET | Refills: 0 | Status: SHIPPED | OUTPATIENT
Start: 2022-10-14

## 2022-10-14 NOTE — TELEPHONE ENCOUNTER
Future Appointments   Date Time Provider Larisa Imani   92/88/8374 59:67 AM Marcell Shields MD [facility]     Last office visit 7/25/2022  Last labs taken NONE

## 2022-10-25 ENCOUNTER — OFFICE VISIT (OUTPATIENT)
Dept: RHEUMATOLOGY | Facility: CLINIC | Age: 57
End: 2022-10-25
Payer: MEDICARE

## 2022-10-25 VITALS
OXYGEN SATURATION: 99 % | TEMPERATURE: 98 F | SYSTOLIC BLOOD PRESSURE: 130 MMHG | HEART RATE: 139 BPM | DIASTOLIC BLOOD PRESSURE: 80 MMHG | WEIGHT: 213 LBS | BODY MASS INDEX: 36.37 KG/M2 | HEIGHT: 64 IN

## 2022-10-25 DIAGNOSIS — M19.041 PRIMARY OSTEOARTHRITIS OF BOTH HANDS: ICD-10-CM

## 2022-10-25 DIAGNOSIS — M17.0 PRIMARY OSTEOARTHRITIS OF BOTH KNEES: Primary | ICD-10-CM

## 2022-10-25 DIAGNOSIS — M19.042 PRIMARY OSTEOARTHRITIS OF BOTH HANDS: ICD-10-CM

## 2022-10-25 DIAGNOSIS — M47.896 OTHER OSTEOARTHRITIS OF SPINE, LUMBAR REGION: ICD-10-CM

## 2022-10-25 DIAGNOSIS — R25.2 CRAMPS, EXTREMITY: ICD-10-CM

## 2022-10-25 PROCEDURE — 99214 OFFICE O/P EST MOD 30 MIN: CPT | Performed by: INTERNAL MEDICINE

## 2022-10-25 PROCEDURE — 3008F BODY MASS INDEX DOCD: CPT | Performed by: INTERNAL MEDICINE

## 2022-10-25 PROCEDURE — 3079F DIAST BP 80-89 MM HG: CPT | Performed by: INTERNAL MEDICINE

## 2022-10-25 PROCEDURE — 3075F SYST BP GE 130 - 139MM HG: CPT | Performed by: INTERNAL MEDICINE

## 2022-10-25 RX ORDER — ONDANSETRON 4 MG/1
4 TABLET, ORALLY DISINTEGRATING ORAL EVERY 8 HOURS PRN
Qty: 30 TABLET | Refills: 0 | Status: CANCELLED
Start: 2022-10-25

## 2022-10-25 RX ORDER — HYDROCODONE BITARTRATE AND ACETAMINOPHEN 10; 325 MG/1; MG/1
1-2 TABLET ORAL EVERY 4 HOURS PRN
Qty: 180 TABLET | Refills: 0 | Status: SHIPPED | OUTPATIENT
Start: 2022-12-24 | End: 2023-01-23

## 2022-10-25 RX ORDER — HYDROCODONE BITARTRATE AND ACETAMINOPHEN 10; 325 MG/1; MG/1
1-2 TABLET ORAL EVERY 4 HOURS PRN
Qty: 180 TABLET | Refills: 0 | Status: SHIPPED | OUTPATIENT
Start: 2022-10-25 | End: 2022-11-24

## 2022-10-25 RX ORDER — CELECOXIB 200 MG/1
200 CAPSULE ORAL 2 TIMES DAILY
Qty: 60 CAPSULE | Refills: 2 | Status: SHIPPED | OUTPATIENT
Start: 2022-10-25

## 2022-10-25 RX ORDER — HYDROCODONE BITARTRATE AND ACETAMINOPHEN 10; 325 MG/1; MG/1
1-2 TABLET ORAL EVERY 4 HOURS PRN
Qty: 180 TABLET | Refills: 0 | Status: SHIPPED | OUTPATIENT
Start: 2022-11-24 | End: 2022-12-24

## 2022-10-25 NOTE — PATIENT INSTRUCTIONS
Trial of Celebrex 200 mg as needed twice a day, for treatment of arthritis. Norco 10 mg for pain one -two very 4 hours up to 6 pr day. Norflex was prescibed by your primary for muscle cramps. It can be used one in the evening as needed. Walk for exercise as best you can. RTO 3 months.

## 2022-11-06 DIAGNOSIS — E11.9 TYPE 2 DIABETES MELLITUS WITHOUT COMPLICATION, WITHOUT LONG-TERM CURRENT USE OF INSULIN (HCC): ICD-10-CM

## 2022-11-06 DIAGNOSIS — I15.2 HYPERTENSION ASSOCIATED WITH TYPE 2 DIABETES MELLITUS (HCC): ICD-10-CM

## 2022-11-06 DIAGNOSIS — E11.59 HYPERTENSION ASSOCIATED WITH TYPE 2 DIABETES MELLITUS (HCC): ICD-10-CM

## 2022-11-07 RX ORDER — GLIMEPIRIDE 4 MG/1
TABLET ORAL
Qty: 90 TABLET | Refills: 0 | Status: SHIPPED | OUTPATIENT
Start: 2022-11-07

## 2022-11-07 RX ORDER — LOSARTAN POTASSIUM 100 MG/1
TABLET ORAL
Qty: 90 TABLET | Refills: 0 | Status: SHIPPED | OUTPATIENT
Start: 2022-11-07

## 2022-11-07 RX ORDER — ROSUVASTATIN CALCIUM 10 MG/1
TABLET, COATED ORAL
Qty: 90 TABLET | Refills: 0 | Status: SHIPPED | OUTPATIENT
Start: 2022-11-07

## 2022-11-07 RX ORDER — HYDROCHLOROTHIAZIDE 25 MG/1
TABLET ORAL
Qty: 90 TABLET | Refills: 0 | Status: SHIPPED | OUTPATIENT
Start: 2022-11-07

## 2022-11-07 NOTE — TELEPHONE ENCOUNTER
Metformin HCL 1000 mg  Filled 5-9-22  Qty 180  1 refill  No upcoming appt. LOV 8-17-22   Labs: 8-24-22 Microalb./creat. A1c    hydrochlorothiazide 25 mg  Filled 5-9-22  Qty 90  1 refill  No upcoming appt. LOV 8-17-22     Losartan 100 mg  Filed 5-9-22  Qty 90  1 refill  No upcoming appt. LOV 8-17-22     Glimepiride 4 mg  Filled 5-9-22  Qty 90  1 refill  No upcoming appt. LOV 8-17-22   Labs: 8-24-22 Microalb./creat. A1c    Rosuvastatin 10 mg  Filled 5-9-22  Qty 90  1 refill  No upcoming appt.   LOV 8-17-22   Labs: 8-24-22 Lipid

## 2022-11-08 ENCOUNTER — TELEMEDICINE (OUTPATIENT)
Dept: INTERNAL MEDICINE CLINIC | Facility: CLINIC | Age: 57
End: 2022-11-08
Payer: MEDICARE

## 2022-11-08 DIAGNOSIS — U07.1 COVID: Primary | ICD-10-CM

## 2022-11-08 PROCEDURE — 99213 OFFICE O/P EST LOW 20 MIN: CPT | Performed by: FAMILY MEDICINE

## 2022-11-14 RX ORDER — ONDANSETRON 4 MG/1
TABLET, ORALLY DISINTEGRATING ORAL
Qty: 30 TABLET | Refills: 1 | Status: SHIPPED | OUTPATIENT
Start: 2022-11-14

## 2022-11-14 NOTE — TELEPHONE ENCOUNTER
Future Appointments   Date Time Provider Larisa Imani   11/21/2022 11:30 AM HEAVENLY Vidales EM[facility] 8 [facility] [place]   0/96/0515 08:61 AM Palak Almaraz MD [facility]     Last office visit 10/25/2022  No labs

## 2022-11-19 NOTE — PROGRESS NOTES
[facility]  Dr. Banerjee Exon Progress Note     Subjective:   Gen Guerrero is a(n) 46year old female. Current complaints: Patient presents with:  Osteoarthritis: 3 month f/u.  Pt states 'is not doing good- having more back and hand pain.' Pt would like [id number]:Urgent|| ||00\01||False;

## 2022-11-20 DIAGNOSIS — F41.1 ANXIETY STATE: ICD-10-CM

## 2022-11-20 RX ORDER — ALPRAZOLAM 0.5 MG/1
TABLET ORAL
Qty: 40 TABLET | Refills: 0 | Status: SHIPPED | OUTPATIENT
Start: 2022-11-20

## 2022-11-21 ENCOUNTER — OFFICE VISIT (OUTPATIENT)
Dept: INTERNAL MEDICINE CLINIC | Facility: CLINIC | Age: 57
End: 2022-11-21
Payer: MEDICARE

## 2022-11-21 VITALS
OXYGEN SATURATION: 99 % | BODY MASS INDEX: 35.85 KG/M2 | HEART RATE: 128 BPM | RESPIRATION RATE: 16 BRPM | WEIGHT: 210 LBS | DIASTOLIC BLOOD PRESSURE: 80 MMHG | SYSTOLIC BLOOD PRESSURE: 128 MMHG | TEMPERATURE: 99 F | HEIGHT: 64 IN

## 2022-11-21 DIAGNOSIS — Z23 NEED FOR PNEUMOCOCCAL VACCINATION: ICD-10-CM

## 2022-11-21 DIAGNOSIS — F41.1 ANXIETY STATE: ICD-10-CM

## 2022-11-21 DIAGNOSIS — E78.00 PURE HYPERCHOLESTEROLEMIA: ICD-10-CM

## 2022-11-21 DIAGNOSIS — I10 ESSENTIAL HYPERTENSION, BENIGN: ICD-10-CM

## 2022-11-21 DIAGNOSIS — E11.65 TYPE 2 DIABETES MELLITUS WITH HYPERGLYCEMIA, WITHOUT LONG-TERM CURRENT USE OF INSULIN (HCC): Primary | ICD-10-CM

## 2022-11-21 PROCEDURE — 3079F DIAST BP 80-89 MM HG: CPT | Performed by: FAMILY MEDICINE

## 2022-11-21 PROCEDURE — G0009 ADMIN PNEUMOCOCCAL VACCINE: HCPCS | Performed by: FAMILY MEDICINE

## 2022-11-21 PROCEDURE — 3008F BODY MASS INDEX DOCD: CPT | Performed by: FAMILY MEDICINE

## 2022-11-21 PROCEDURE — 99213 OFFICE O/P EST LOW 20 MIN: CPT | Performed by: FAMILY MEDICINE

## 2022-11-21 PROCEDURE — 3074F SYST BP LT 130 MM HG: CPT | Performed by: FAMILY MEDICINE

## 2022-11-21 PROCEDURE — 90677 PCV20 VACCINE IM: CPT | Performed by: FAMILY MEDICINE

## 2022-11-29 ENCOUNTER — TELEPHONE (OUTPATIENT)
Dept: INTERNAL MEDICINE CLINIC | Facility: CLINIC | Age: 57
End: 2022-11-29

## 2022-12-20 DIAGNOSIS — F41.1 ANXIETY STATE: ICD-10-CM

## 2022-12-20 RX ORDER — ALPRAZOLAM 0.5 MG/1
0.5 TABLET ORAL 3 TIMES DAILY PRN
Qty: 40 TABLET | Refills: 0 | Status: SHIPPED | OUTPATIENT
Start: 2022-12-20

## 2023-01-04 ENCOUNTER — TELEPHONE (OUTPATIENT)
Dept: INTERNAL MEDICINE CLINIC | Facility: CLINIC | Age: 58
End: 2023-01-04

## 2023-01-16 RX ORDER — ONDANSETRON 4 MG/1
TABLET, ORALLY DISINTEGRATING ORAL
Qty: 30 TABLET | Refills: 2 | Status: SHIPPED | OUTPATIENT
Start: 2023-01-16

## 2023-01-16 NOTE — TELEPHONE ENCOUNTER
Future Appointments   Date Time Provider Larisa Imani   3/11/2791 21:21 AM Rip Major MD [facility] [place]     Last office visit: 10/25/2022    Last fill: 11/14/2022 30 tab 1 refill

## 2023-01-19 DIAGNOSIS — F41.1 ANXIETY STATE: ICD-10-CM

## 2023-01-19 RX ORDER — ALPRAZOLAM 0.5 MG/1
TABLET ORAL
Qty: 40 TABLET | Refills: 0 | Status: SHIPPED | OUTPATIENT
Start: 2023-01-19

## 2023-01-25 ENCOUNTER — OFFICE VISIT (OUTPATIENT)
Dept: RHEUMATOLOGY | Facility: CLINIC | Age: 58
End: 2023-01-25
Payer: MEDICARE

## 2023-01-25 VITALS
BODY MASS INDEX: 35.68 KG/M2 | SYSTOLIC BLOOD PRESSURE: 120 MMHG | RESPIRATION RATE: 18 BRPM | TEMPERATURE: 98 F | DIASTOLIC BLOOD PRESSURE: 78 MMHG | HEART RATE: 106 BPM | WEIGHT: 209 LBS | HEIGHT: 64 IN

## 2023-01-25 DIAGNOSIS — M17.0 PRIMARY OSTEOARTHRITIS OF BOTH KNEES: Primary | ICD-10-CM

## 2023-01-25 DIAGNOSIS — M16.0 PRIMARY OSTEOARTHRITIS OF HIPS, BILATERAL: ICD-10-CM

## 2023-01-25 DIAGNOSIS — Z96.642 HISTORY OF TOTAL LEFT HIP REPLACEMENT: ICD-10-CM

## 2023-01-25 DIAGNOSIS — M19.041 PRIMARY OSTEOARTHRITIS OF BOTH HANDS: ICD-10-CM

## 2023-01-25 DIAGNOSIS — M19.042 PRIMARY OSTEOARTHRITIS OF BOTH HANDS: ICD-10-CM

## 2023-01-25 PROCEDURE — 3008F BODY MASS INDEX DOCD: CPT | Performed by: INTERNAL MEDICINE

## 2023-01-25 PROCEDURE — 3074F SYST BP LT 130 MM HG: CPT | Performed by: INTERNAL MEDICINE

## 2023-01-25 PROCEDURE — 3078F DIAST BP <80 MM HG: CPT | Performed by: INTERNAL MEDICINE

## 2023-01-25 PROCEDURE — 99214 OFFICE O/P EST MOD 30 MIN: CPT | Performed by: INTERNAL MEDICINE

## 2023-01-25 RX ORDER — HYDROCODONE BITARTRATE AND ACETAMINOPHEN 10; 325 MG/1; MG/1
1-2 TABLET ORAL EVERY 6 HOURS PRN
Qty: 180 TABLET | Refills: 0 | Status: SHIPPED | OUTPATIENT
Start: 2023-01-25 | End: 2023-02-24

## 2023-01-25 RX ORDER — HYDROCODONE BITARTRATE AND ACETAMINOPHEN 10; 325 MG/1; MG/1
1-2 TABLET ORAL EVERY 6 HOURS PRN
Qty: 180 TABLET | Refills: 0 | Status: SHIPPED | OUTPATIENT
Start: 2023-03-27 | End: 2023-04-26

## 2023-01-25 RX ORDER — HYDROCODONE BITARTRATE AND ACETAMINOPHEN 10; 325 MG/1; MG/1
1 TABLET ORAL EVERY 6 HOURS PRN
COMMUNITY

## 2023-01-25 RX ORDER — HYDROCODONE BITARTRATE AND ACETAMINOPHEN 10; 325 MG/1; MG/1
1-2 TABLET ORAL EVERY 6 HOURS PRN
Qty: 180 TABLET | Refills: 0 | Status: SHIPPED | OUTPATIENT
Start: 2023-02-25 | End: 2023-03-27

## 2023-01-25 NOTE — PATIENT INSTRUCTIONS
Use aleve 220 mg as needed up to 3 per day. \Off Celebrex no help. Norco 10 mg use for pain 1-2 every 6 hours as needed. Use volatren gel/ otc creams prn for your knees and back. Return to office 3 months.

## 2023-01-29 ENCOUNTER — PATIENT MESSAGE (OUTPATIENT)
Dept: INTERNAL MEDICINE CLINIC | Facility: CLINIC | Age: 58
End: 2023-01-29

## 2023-01-30 NOTE — TELEPHONE ENCOUNTER
Lab due 02/24/22 repeat Vit. D, Lipid, and A1c. Next OV, 05/2023  Due for AWV supervisit.     [facility] sent to pt with answered ?s

## 2023-01-30 NOTE — TELEPHONE ENCOUNTER
From: Yola Berkowitz  To: Deyvi CabezasumeshHEAVENLY  Sent: 1/29/2023 7:48 PM CST  Subject: Vitamin D2    Hi Shona   I have finally finished the script of vitamin D2 39223f that you prescribed to me. So, do I need to go for a blood test soon and are the orders in the system? Also, when do I need to see you again? Please advise. Thank you.   Leatha Luis

## 2023-02-16 ENCOUNTER — TELEPHONE (OUTPATIENT)
Dept: INTERNAL MEDICINE CLINIC | Facility: CLINIC | Age: 58
End: 2023-02-16

## 2023-02-17 DIAGNOSIS — F41.1 ANXIETY STATE: ICD-10-CM

## 2023-02-19 RX ORDER — ALPRAZOLAM 0.5 MG/1
TABLET ORAL
Qty: 40 TABLET | Refills: 0 | Status: SHIPPED | OUTPATIENT
Start: 2023-02-19

## 2023-02-21 ENCOUNTER — LAB ENCOUNTER (OUTPATIENT)
Dept: LAB | Age: 58
End: 2023-02-21
Attending: FAMILY MEDICINE
Payer: MEDICARE

## 2023-02-21 DIAGNOSIS — E55.9 VITAMIN D DEFICIENCY: ICD-10-CM

## 2023-02-21 LAB — VIT D+METAB SERPL-MCNC: 89.3 NG/ML (ref 30–100)

## 2023-02-21 PROCEDURE — 80061 LIPID PANEL: CPT | Performed by: FAMILY MEDICINE

## 2023-02-21 PROCEDURE — 36415 COLL VENOUS BLD VENIPUNCTURE: CPT | Performed by: FAMILY MEDICINE

## 2023-02-21 PROCEDURE — 3051F HG A1C>EQUAL 7.0%<8.0%: CPT | Performed by: FAMILY MEDICINE

## 2023-02-21 PROCEDURE — 83036 HEMOGLOBIN GLYCOSYLATED A1C: CPT | Performed by: FAMILY MEDICINE

## 2023-02-21 PROCEDURE — 82306 VITAMIN D 25 HYDROXY: CPT

## 2023-02-22 ENCOUNTER — TELEPHONE (OUTPATIENT)
Dept: INTERNAL MEDICINE CLINIC | Facility: CLINIC | Age: 58
End: 2023-02-22

## 2023-02-22 DIAGNOSIS — E11.9 TYPE 2 DIABETES MELLITUS WITHOUT COMPLICATION, WITHOUT LONG-TERM CURRENT USE OF INSULIN (HCC): Primary | ICD-10-CM

## 2023-02-22 DIAGNOSIS — E78.00 PURE HYPERCHOLESTEROLEMIA: ICD-10-CM

## 2023-03-06 PROBLEM — S06.0X0A CONCUSSION WITHOUT LOSS OF CONSCIOUSNESS: Status: RESOLVED | Noted: 2022-04-27 | Resolved: 2023-03-06

## 2023-03-07 ENCOUNTER — TELEPHONE (OUTPATIENT)
Dept: INTERNAL MEDICINE CLINIC | Facility: CLINIC | Age: 58
End: 2023-03-07

## 2023-03-07 ENCOUNTER — OFFICE VISIT (OUTPATIENT)
Dept: INTERNAL MEDICINE CLINIC | Facility: CLINIC | Age: 58
End: 2023-03-07
Payer: MEDICARE

## 2023-03-07 VITALS
DIASTOLIC BLOOD PRESSURE: 68 MMHG | SYSTOLIC BLOOD PRESSURE: 128 MMHG | OXYGEN SATURATION: 97 % | BODY MASS INDEX: 35.68 KG/M2 | HEART RATE: 112 BPM | WEIGHT: 209 LBS | HEIGHT: 64 IN

## 2023-03-07 DIAGNOSIS — R25.2 CRAMPS, EXTREMITY: ICD-10-CM

## 2023-03-07 DIAGNOSIS — E11.65 TYPE 2 DIABETES MELLITUS WITH HYPERGLYCEMIA, WITHOUT LONG-TERM CURRENT USE OF INSULIN (HCC): Chronic | ICD-10-CM

## 2023-03-07 DIAGNOSIS — F33.0 MILD RECURRENT MAJOR DEPRESSION (HCC): Chronic | ICD-10-CM

## 2023-03-07 DIAGNOSIS — Z01.419 ENCOUNTER FOR GYNECOLOGICAL EXAMINATION WITH PAPANICOLAOU SMEAR OF CERVIX: Primary | ICD-10-CM

## 2023-03-07 DIAGNOSIS — Z00.00 ENCOUNTER FOR ANNUAL HEALTH EXAMINATION: ICD-10-CM

## 2023-03-07 DIAGNOSIS — Z87.442 HISTORY OF KIDNEY STONES: ICD-10-CM

## 2023-03-07 DIAGNOSIS — N18.31 STAGE 3A CHRONIC KIDNEY DISEASE (HCC): Chronic | ICD-10-CM

## 2023-03-07 DIAGNOSIS — M48.02 STENOSIS OF CERVICAL SPINE: ICD-10-CM

## 2023-03-07 DIAGNOSIS — H90.3 SENSORINEURAL HEARING LOSS (SNHL) OF BOTH EARS: ICD-10-CM

## 2023-03-07 DIAGNOSIS — M19.042 PRIMARY OSTEOARTHRITIS OF BOTH HANDS: ICD-10-CM

## 2023-03-07 DIAGNOSIS — M16.0 PRIMARY OSTEOARTHRITIS OF HIPS, BILATERAL: ICD-10-CM

## 2023-03-07 DIAGNOSIS — F41.1 ANXIETY STATE: ICD-10-CM

## 2023-03-07 DIAGNOSIS — D49.2 SKIN GROWTH: ICD-10-CM

## 2023-03-07 DIAGNOSIS — E78.00 PURE HYPERCHOLESTEROLEMIA: ICD-10-CM

## 2023-03-07 DIAGNOSIS — K21.9 GASTROESOPHAGEAL REFLUX DISEASE WITHOUT ESOPHAGITIS: ICD-10-CM

## 2023-03-07 DIAGNOSIS — J41.0 SMOKERS' COUGH (HCC): Chronic | ICD-10-CM

## 2023-03-07 DIAGNOSIS — M17.0 PRIMARY OSTEOARTHRITIS OF BOTH KNEES: ICD-10-CM

## 2023-03-07 DIAGNOSIS — D50.9 IRON DEFICIENCY ANEMIA, UNSPECIFIED IRON DEFICIENCY ANEMIA TYPE: ICD-10-CM

## 2023-03-07 DIAGNOSIS — I10 ESSENTIAL HYPERTENSION, BENIGN: ICD-10-CM

## 2023-03-07 DIAGNOSIS — F17.200 CURRENT SMOKER: ICD-10-CM

## 2023-03-07 DIAGNOSIS — E66.01 CLASS 2 SEVERE OBESITY DUE TO EXCESS CALORIES WITH SERIOUS COMORBIDITY AND BODY MASS INDEX (BMI) OF 35.0 TO 35.9 IN ADULT (HCC): ICD-10-CM

## 2023-03-07 DIAGNOSIS — G43.019 INTRACTABLE MIGRAINE WITHOUT AURA AND WITHOUT STATUS MIGRAINOSUS: ICD-10-CM

## 2023-03-07 DIAGNOSIS — M47.896 OTHER OSTEOARTHRITIS OF SPINE, LUMBAR REGION: ICD-10-CM

## 2023-03-07 DIAGNOSIS — L30.4 INTERTRIGO: ICD-10-CM

## 2023-03-07 DIAGNOSIS — M19.041 PRIMARY OSTEOARTHRITIS OF BOTH HANDS: ICD-10-CM

## 2023-03-07 DIAGNOSIS — Z96.642 HISTORY OF TOTAL LEFT HIP REPLACEMENT: ICD-10-CM

## 2023-03-07 PROCEDURE — 99406 BEHAV CHNG SMOKING 3-10 MIN: CPT | Performed by: FAMILY MEDICINE

## 2023-03-07 PROCEDURE — 3074F SYST BP LT 130 MM HG: CPT | Performed by: FAMILY MEDICINE

## 2023-03-07 PROCEDURE — 3008F BODY MASS INDEX DOCD: CPT | Performed by: FAMILY MEDICINE

## 2023-03-07 PROCEDURE — 3078F DIAST BP <80 MM HG: CPT | Performed by: FAMILY MEDICINE

## 2023-03-07 PROCEDURE — 99396 PREV VISIT EST AGE 40-64: CPT | Performed by: FAMILY MEDICINE

## 2023-03-07 PROCEDURE — G0439 PPPS, SUBSEQ VISIT: HCPCS | Performed by: FAMILY MEDICINE

## 2023-03-07 PROCEDURE — 96160 PT-FOCUSED HLTH RISK ASSMT: CPT | Performed by: FAMILY MEDICINE

## 2023-03-07 RX ORDER — CLOTRIMAZOLE AND BETAMETHASONE DIPROPIONATE 10; .64 MG/G; MG/G
1 CREAM TOPICAL 2 TIMES DAILY PRN
Qty: 45 G | Refills: 1 | Status: SHIPPED | OUTPATIENT
Start: 2023-03-07

## 2023-03-07 RX ORDER — KETOCONAZOLE 20 MG/ML
1 SHAMPOO TOPICAL
Qty: 120 ML | Refills: 1 | Status: SHIPPED | OUTPATIENT
Start: 2023-03-09 | End: 2023-03-07

## 2023-03-07 RX ORDER — MULTIVIT-MIN/IRON/FOLIC ACID/K 18-600-40
1 CAPSULE ORAL DAILY
COMMUNITY

## 2023-03-07 RX ORDER — KETOCONAZOLE 20 MG/ML
SHAMPOO TOPICAL
Qty: 120 ML | Refills: 1 | Status: SHIPPED | OUTPATIENT
Start: 2023-03-07

## 2023-03-07 NOTE — TELEPHONE ENCOUNTER
New rx needed with instructions to \"use twice weekly\" do not include amount in instructions    Ketoconazole 2% external shampoo

## 2023-03-19 DIAGNOSIS — F41.1 ANXIETY STATE: ICD-10-CM

## 2023-03-20 RX ORDER — ALPRAZOLAM 0.5 MG/1
TABLET ORAL
Qty: 40 TABLET | Refills: 0 | Status: SHIPPED | OUTPATIENT
Start: 2023-03-20

## 2023-03-23 LAB — HPV I/H RISK 1 DNA SPEC QL NAA+PROBE: NEGATIVE

## 2023-03-27 ENCOUNTER — TELEPHONE (OUTPATIENT)
Dept: RHEUMATOLOGY | Facility: CLINIC | Age: 58
End: 2023-03-27

## 2023-03-27 RX ORDER — HYDROCODONE BITARTRATE AND ACETAMINOPHEN 10; 325 MG/1; MG/1
1-2 TABLET ORAL EVERY 6 HOURS PRN
Qty: 180 TABLET | Refills: 0 | Status: SHIPPED | OUTPATIENT
Start: 2023-03-27 | End: 2023-04-26

## 2023-03-27 NOTE — TELEPHONE ENCOUNTER
Norco fill due to be filled today. Bradley is currently at [facility] in [place]. Pended to pharmacy she is requesting.

## 2023-03-27 NOTE — TELEPHONE ENCOUNTER
Pt called office, requesting we change prescription for [place] to her [facility] due to out of stock at [facility]

## 2023-03-27 NOTE — TELEPHONE ENCOUNTER
Norco refill 10 mg 1 or 2 tablets every 6 hours #180 sent to [facility] in [place] on [address].   Dr. Neff Number

## 2023-04-10 DIAGNOSIS — F41.1 ANXIETY STATE: ICD-10-CM

## 2023-04-10 DIAGNOSIS — F33.0 MILD RECURRENT MAJOR DEPRESSION (HCC): ICD-10-CM

## 2023-04-10 RX ORDER — ONDANSETRON 4 MG/1
TABLET, ORALLY DISINTEGRATING ORAL
Qty: 30 TABLET | Refills: 0 | Status: SHIPPED | OUTPATIENT
Start: 2023-04-10

## 2023-04-10 RX ORDER — PAROXETINE HYDROCHLORIDE 40 MG/1
40 TABLET, FILM COATED ORAL EVERY MORNING
Qty: 30 TABLET | Refills: 2 | Status: SHIPPED | OUTPATIENT
Start: 2023-04-10

## 2023-04-10 NOTE — TELEPHONE ENCOUNTER
Future Appointments   Date Time Provider Larisa Imani   4/95/7320  7:23 PM Hussein Acharya MD [facility]     LOV   1/25/2023    Last refill 1/16/2023  30 tabs, 2 refills

## 2023-04-19 DIAGNOSIS — F41.1 ANXIETY STATE: ICD-10-CM

## 2023-04-19 RX ORDER — ALPRAZOLAM 0.5 MG/1
TABLET ORAL
Qty: 40 TABLET | Refills: 0 | Status: SHIPPED | OUTPATIENT
Start: 2023-04-19 | End: 2023-04-24

## 2023-04-24 ENCOUNTER — OFFICE VISIT (OUTPATIENT)
Dept: RHEUMATOLOGY | Facility: CLINIC | Age: 58
End: 2023-04-24
Payer: MEDICARE

## 2023-04-24 VITALS
OXYGEN SATURATION: 98 % | DIASTOLIC BLOOD PRESSURE: 68 MMHG | TEMPERATURE: 98 F | HEIGHT: 64 IN | RESPIRATION RATE: 20 BRPM | HEART RATE: 115 BPM | WEIGHT: 209 LBS | SYSTOLIC BLOOD PRESSURE: 104 MMHG | BODY MASS INDEX: 35.68 KG/M2

## 2023-04-24 DIAGNOSIS — M16.0 PRIMARY OSTEOARTHRITIS OF HIPS, BILATERAL: ICD-10-CM

## 2023-04-24 DIAGNOSIS — M16.11 PRIMARY OSTEOARTHRITIS OF RIGHT HIP: ICD-10-CM

## 2023-04-24 DIAGNOSIS — M17.0 PRIMARY OSTEOARTHRITIS OF BOTH KNEES: ICD-10-CM

## 2023-04-24 DIAGNOSIS — M48.02 STENOSIS OF CERVICAL SPINE: ICD-10-CM

## 2023-04-24 DIAGNOSIS — Z96.642 HISTORY OF TOTAL LEFT HIP REPLACEMENT: ICD-10-CM

## 2023-04-24 DIAGNOSIS — M47.896 OTHER OSTEOARTHRITIS OF SPINE, LUMBAR REGION: Primary | ICD-10-CM

## 2023-04-24 RX ORDER — HYDROCODONE BITARTRATE AND ACETAMINOPHEN 10; 325 MG/1; MG/1
1-2 TABLET ORAL EVERY 6 HOURS PRN
Qty: 180 TABLET | Refills: 0 | Status: SHIPPED | OUTPATIENT
Start: 2023-07-21 | End: 2023-08-20

## 2023-04-24 RX ORDER — HYDROCODONE BITARTRATE AND ACETAMINOPHEN 10; 325 MG/1; MG/1
1-2 TABLET ORAL EVERY 6 HOURS PRN
Qty: 180 TABLET | Refills: 0 | Status: SHIPPED | OUTPATIENT
Start: 2023-06-21 | End: 2023-07-21

## 2023-04-24 RX ORDER — HYDROCODONE BITARTRATE AND ACETAMINOPHEN 10; 325 MG/1; MG/1
1-2 TABLET ORAL EVERY 6 HOURS PRN
Qty: 180 TABLET | Refills: 0 | Status: SHIPPED | OUTPATIENT
Start: 2023-05-21 | End: 2023-06-20

## 2023-04-24 NOTE — PATIENT INSTRUCTIONS
Norco 10 mg every 6 hours 1-2 up to 6 per day. Use otc creams if they help - diclofenac gel or aspircreme. Activity as tolerated.   Return to office August.

## 2023-05-02 ENCOUNTER — TELEPHONE (OUTPATIENT)
Dept: RHEUMATOLOGY | Facility: CLINIC | Age: 58
End: 2023-05-02

## 2023-05-02 ENCOUNTER — PATIENT MESSAGE (OUTPATIENT)
Dept: RHEUMATOLOGY | Facility: CLINIC | Age: 58
End: 2023-05-02

## 2023-05-02 RX ORDER — HYDROCODONE BITARTRATE AND ACETAMINOPHEN 10; 325 MG/1; MG/1
1-2 TABLET ORAL EVERY 6 HOURS PRN
Qty: 180 TABLET | Refills: 0 | Status: SHIPPED | OUTPATIENT
Start: 2023-05-21 | End: 2023-06-20

## 2023-05-02 NOTE — TELEPHONE ENCOUNTER
Norco refill changed to [facility] [address] in [facility] at patient's request.  10 mg 1 every 6 hours #120.   Dr. Hector Khoury

## 2023-05-02 NOTE — TELEPHONE ENCOUNTER
From: Jarrell Berkowitz  To: Jelly Soriano MD  Sent: 2/6/1889 1:39 PM CDT  Subject: Ekta Shreyagigi refills    Hi Dr. Dalton Sanchez:  Could you please transfer my [place] prescriptions to [facility] in [place] on [address]. [facility] is just so expensive. Thank you so much.     Angle Atkinson

## 2023-05-07 DIAGNOSIS — I15.2 HYPERTENSION ASSOCIATED WITH TYPE 2 DIABETES MELLITUS (HCC): ICD-10-CM

## 2023-05-07 DIAGNOSIS — E11.9 TYPE 2 DIABETES MELLITUS WITHOUT COMPLICATION, WITHOUT LONG-TERM CURRENT USE OF INSULIN (HCC): ICD-10-CM

## 2023-05-07 DIAGNOSIS — E11.59 HYPERTENSION ASSOCIATED WITH TYPE 2 DIABETES MELLITUS (HCC): ICD-10-CM

## 2023-05-08 DIAGNOSIS — F33.0 MILD RECURRENT MAJOR DEPRESSION (HCC): ICD-10-CM

## 2023-05-08 DIAGNOSIS — F41.1 ANXIETY STATE: ICD-10-CM

## 2023-05-08 RX ORDER — LOSARTAN POTASSIUM 100 MG/1
100 TABLET ORAL DAILY
Qty: 90 TABLET | Refills: 1 | Status: SHIPPED | OUTPATIENT
Start: 2023-05-08

## 2023-05-08 RX ORDER — PAROXETINE HYDROCHLORIDE 40 MG/1
40 TABLET, FILM COATED ORAL EVERY MORNING
Qty: 30 TABLET | Refills: 2 | OUTPATIENT
Start: 2023-05-08

## 2023-05-08 RX ORDER — ROSUVASTATIN CALCIUM 10 MG/1
10 TABLET, COATED ORAL NIGHTLY
Qty: 90 TABLET | Refills: 1 | Status: SHIPPED | OUTPATIENT
Start: 2023-05-08

## 2023-05-08 RX ORDER — HYDROCHLOROTHIAZIDE 25 MG/1
25 TABLET ORAL DAILY
Qty: 90 TABLET | Refills: 1 | Status: SHIPPED | OUTPATIENT
Start: 2023-05-08

## 2023-05-13 DIAGNOSIS — E11.9 TYPE 2 DIABETES MELLITUS WITHOUT COMPLICATION, WITHOUT LONG-TERM CURRENT USE OF INSULIN (HCC): ICD-10-CM

## 2023-05-13 RX ORDER — GLIMEPIRIDE 4 MG/1
TABLET ORAL
Qty: 90 TABLET | Refills: 0 | Status: SHIPPED | OUTPATIENT
Start: 2023-05-13

## 2023-05-15 RX ORDER — ONDANSETRON 4 MG/1
TABLET, ORALLY DISINTEGRATING ORAL
Qty: 30 TABLET | Refills: 1 | Status: SHIPPED | OUTPATIENT
Start: 2023-05-15

## 2023-05-15 NOTE — TELEPHONE ENCOUNTER
Future Appointments   Date Time Provider Larisa Diallo   7/22/3998 90:90 PM Tash Ingram MD [facility] [place]     Last office visit: 4/24/2023    Last fill: 4/10/2023 30 mg, 0 refills

## 2023-05-22 DIAGNOSIS — F41.1 ANXIETY STATE: ICD-10-CM

## 2023-05-22 RX ORDER — ALPRAZOLAM 0.5 MG/1
0.5 TABLET ORAL 3 TIMES DAILY PRN
Qty: 40 TABLET | Refills: 0 | Status: SHIPPED | OUTPATIENT
Start: 2023-05-22

## 2023-06-04 DIAGNOSIS — F33.0 MILD RECURRENT MAJOR DEPRESSION (HCC): ICD-10-CM

## 2023-06-04 DIAGNOSIS — F41.1 ANXIETY STATE: ICD-10-CM

## 2023-06-05 RX ORDER — PAROXETINE HYDROCHLORIDE 40 MG/1
40 TABLET, FILM COATED ORAL EVERY MORNING
Qty: 30 TABLET | Refills: 2 | Status: SHIPPED | OUTPATIENT
Start: 2023-06-05

## 2023-06-20 DIAGNOSIS — F41.1 ANXIETY STATE: ICD-10-CM

## 2023-06-20 RX ORDER — ALPRAZOLAM 0.5 MG/1
0.5 TABLET ORAL 3 TIMES DAILY PRN
Qty: 40 TABLET | Refills: 0 | Status: SHIPPED | OUTPATIENT
Start: 2023-06-20

## 2023-07-06 RX ORDER — ONDANSETRON 4 MG/1
TABLET, ORALLY DISINTEGRATING ORAL
Qty: 30 TABLET | Refills: 0 | Status: SHIPPED | OUTPATIENT
Start: 2023-07-06

## 2023-07-06 NOTE — TELEPHONE ENCOUNTER
Future Appointments   Date Time Provider Larisa Imani   7/6/2023 11:30 AM REF HND SCHEDULED RESOURCE REFEMMGHND REFHND   3/34/5063 28:18 PM MD JESSICA Morgan     Last OV: 4/24/23  Last Fill: 5/15/23, 30 tabs, 1 refills

## 2023-07-13 RX ORDER — ONDANSETRON 4 MG/1
TABLET, ORALLY DISINTEGRATING ORAL
Qty: 30 TABLET | Refills: 0 | OUTPATIENT
Start: 2023-07-13

## 2023-07-18 DIAGNOSIS — F41.1 ANXIETY STATE: ICD-10-CM

## 2023-07-18 RX ORDER — ALPRAZOLAM 0.5 MG/1
0.5 TABLET ORAL 3 TIMES DAILY PRN
Qty: 40 TABLET | Refills: 0 | Status: SHIPPED | OUTPATIENT
Start: 2023-07-18

## 2023-07-25 ENCOUNTER — LAB ENCOUNTER (OUTPATIENT)
Dept: LAB | Facility: REFERENCE LAB | Age: 58
End: 2023-07-25
Attending: FAMILY MEDICINE
Payer: MEDICARE

## 2023-07-25 DIAGNOSIS — E11.9 TYPE 2 DIABETES MELLITUS WITHOUT COMPLICATION, WITHOUT LONG-TERM CURRENT USE OF INSULIN (HCC): ICD-10-CM

## 2023-07-25 LAB
EST. AVERAGE GLUCOSE BLD GHB EST-MCNC: 166 MG/DL (ref 68–126)
HBA1C MFR BLD: 7.4 % (ref ?–5.7)

## 2023-07-25 PROCEDURE — 83036 HEMOGLOBIN GLYCOSYLATED A1C: CPT

## 2023-07-25 PROCEDURE — 36415 COLL VENOUS BLD VENIPUNCTURE: CPT

## 2023-07-25 PROCEDURE — 3051F HG A1C>EQUAL 7.0%<8.0%: CPT | Performed by: FAMILY MEDICINE

## 2023-08-06 DIAGNOSIS — E11.9 TYPE 2 DIABETES MELLITUS WITHOUT COMPLICATION, WITHOUT LONG-TERM CURRENT USE OF INSULIN (HCC): ICD-10-CM

## 2023-08-07 RX ORDER — GLIMEPIRIDE 4 MG/1
4 TABLET ORAL
Qty: 90 TABLET | Refills: 0 | Status: SHIPPED | OUTPATIENT
Start: 2023-08-07

## 2023-08-07 NOTE — TELEPHONE ENCOUNTER
Glimepiride 4 mg  Filled 5-13-23  Qty 90  0 refills  No upcoming appt  LOV 3-7-23 SM  Labs 7-28-23 A1c

## 2023-08-07 NOTE — TELEPHONE ENCOUNTER
Future Appointments   Date Time Provider Larisa Imani   5/89/0646 54:10 PM Lilly Pepper MD [facility] [place]  4/24/2023    Last refill  7/6/2023  30 tabs, 0 refills

## 2023-08-08 RX ORDER — ONDANSETRON 4 MG/1
TABLET, ORALLY DISINTEGRATING ORAL
Qty: 30 TABLET | Refills: 0 | Status: SHIPPED | OUTPATIENT
Start: 2023-08-08

## 2023-08-16 DIAGNOSIS — F41.1 ANXIETY STATE: ICD-10-CM

## 2023-08-17 ENCOUNTER — OFFICE VISIT (OUTPATIENT)
Dept: RHEUMATOLOGY | Facility: CLINIC | Age: 58
End: 2023-08-17
Payer: MEDICARE

## 2023-08-17 VITALS
SYSTOLIC BLOOD PRESSURE: 134 MMHG | HEART RATE: 121 BPM | HEIGHT: 64 IN | DIASTOLIC BLOOD PRESSURE: 80 MMHG | RESPIRATION RATE: 18 BRPM | TEMPERATURE: 97 F | WEIGHT: 207.81 LBS | BODY MASS INDEX: 35.48 KG/M2 | OXYGEN SATURATION: 97 %

## 2023-08-17 DIAGNOSIS — F41.1 ANXIETY STATE: ICD-10-CM

## 2023-08-17 DIAGNOSIS — M17.0 PRIMARY OSTEOARTHRITIS OF BOTH KNEES: ICD-10-CM

## 2023-08-17 DIAGNOSIS — Z96.642 HISTORY OF TOTAL LEFT HIP REPLACEMENT: ICD-10-CM

## 2023-08-17 DIAGNOSIS — M19.042 PRIMARY OSTEOARTHRITIS OF BOTH HANDS: Primary | ICD-10-CM

## 2023-08-17 DIAGNOSIS — M19.041 PRIMARY OSTEOARTHRITIS OF BOTH HANDS: Primary | ICD-10-CM

## 2023-08-17 PROCEDURE — 3075F SYST BP GE 130 - 139MM HG: CPT | Performed by: INTERNAL MEDICINE

## 2023-08-17 PROCEDURE — 99214 OFFICE O/P EST MOD 30 MIN: CPT | Performed by: INTERNAL MEDICINE

## 2023-08-17 PROCEDURE — 3008F BODY MASS INDEX DOCD: CPT | Performed by: INTERNAL MEDICINE

## 2023-08-17 PROCEDURE — 3079F DIAST BP 80-89 MM HG: CPT | Performed by: INTERNAL MEDICINE

## 2023-08-17 RX ORDER — HYDROCODONE BITARTRATE AND ACETAMINOPHEN 10; 325 MG/1; MG/1
TABLET ORAL
Qty: 180 TABLET | Refills: 0 | Status: SHIPPED | OUTPATIENT
Start: 2023-09-18

## 2023-08-17 RX ORDER — ALPRAZOLAM 0.5 MG/1
0.5 TABLET ORAL 3 TIMES DAILY PRN
Qty: 40 TABLET | Refills: 0 | Status: SHIPPED | OUTPATIENT
Start: 2023-08-17

## 2023-08-17 RX ORDER — HYDROCODONE BITARTRATE AND ACETAMINOPHEN 10; 325 MG/1; MG/1
TABLET ORAL
Qty: 180 TABLET | Refills: 0 | Status: SHIPPED | OUTPATIENT
Start: 2023-10-18

## 2023-08-17 RX ORDER — HYDROCODONE BITARTRATE AND ACETAMINOPHEN 10; 325 MG/1; MG/1
TABLET ORAL
Qty: 180 TABLET | Refills: 0 | Status: SHIPPED | OUTPATIENT
Start: 2023-08-19

## 2023-08-17 NOTE — PATIENT INSTRUCTIONS
Norco continue this for pain 10 mg 1 or 2 at a time up to 6 a day. Previous use of multiple different NSAIDs not helpful. Ibuprofen, Naprosyn, Celebrex no help. Pace your activity. Return to office for recheck 3 months.

## 2023-08-18 RX ORDER — ALPRAZOLAM 0.5 MG/1
0.5 TABLET ORAL 3 TIMES DAILY PRN
Qty: 40 TABLET | Refills: 0 | OUTPATIENT
Start: 2023-08-18

## 2023-09-05 DIAGNOSIS — F33.0 MILD RECURRENT MAJOR DEPRESSION (HCC): ICD-10-CM

## 2023-09-05 DIAGNOSIS — F41.1 ANXIETY STATE: ICD-10-CM

## 2023-09-06 RX ORDER — PAROXETINE HYDROCHLORIDE 40 MG/1
40 TABLET, FILM COATED ORAL EVERY MORNING
Qty: 30 TABLET | Refills: 0 | Status: SHIPPED | OUTPATIENT
Start: 2023-09-06

## 2023-09-06 RX ORDER — ONDANSETRON 4 MG/1
TABLET, ORALLY DISINTEGRATING ORAL
Qty: 30 TABLET | Refills: 1 | Status: SHIPPED | OUTPATIENT
Start: 2023-09-06

## 2023-09-06 NOTE — TELEPHONE ENCOUNTER
Future Appointments   Date Time Provider Larisa Diallo   65/81/1367 37:73 PM Edwardo Fierro MD [facility]ONDA CRAIG  8/17/2023    Last refill  8/18/2023  30 tabs, 0 refills

## 2023-09-13 DIAGNOSIS — F41.1 ANXIETY STATE: ICD-10-CM

## 2023-09-14 RX ORDER — ALPRAZOLAM 0.5 MG/1
0.5 TABLET ORAL 3 TIMES DAILY PRN
Qty: 40 TABLET | Refills: 0 | Status: SHIPPED | OUTPATIENT
Start: 2023-09-14

## 2023-09-27 ENCOUNTER — OFFICE VISIT (OUTPATIENT)
Dept: INTERNAL MEDICINE CLINIC | Facility: CLINIC | Age: 58
End: 2023-09-27
Payer: MEDICARE

## 2023-09-27 VITALS
TEMPERATURE: 98 F | OXYGEN SATURATION: 96 % | HEIGHT: 64 IN | RESPIRATION RATE: 16 BRPM | SYSTOLIC BLOOD PRESSURE: 120 MMHG | HEART RATE: 108 BPM | DIASTOLIC BLOOD PRESSURE: 84 MMHG | BODY MASS INDEX: 36.16 KG/M2 | WEIGHT: 211.81 LBS

## 2023-09-27 DIAGNOSIS — Z12.31 ENCOUNTER FOR SCREENING MAMMOGRAM FOR MALIGNANT NEOPLASM OF BREAST: ICD-10-CM

## 2023-09-27 DIAGNOSIS — E11.65 TYPE 2 DIABETES MELLITUS WITH HYPERGLYCEMIA, WITHOUT LONG-TERM CURRENT USE OF INSULIN (HCC): Primary | ICD-10-CM

## 2023-09-27 DIAGNOSIS — E78.00 PURE HYPERCHOLESTEROLEMIA: ICD-10-CM

## 2023-09-27 DIAGNOSIS — F33.0 MILD RECURRENT MAJOR DEPRESSION (HCC): Chronic | ICD-10-CM

## 2023-09-27 DIAGNOSIS — Z23 NEED FOR INFLUENZA VACCINATION: ICD-10-CM

## 2023-09-27 DIAGNOSIS — Z12.11 ENCOUNTER FOR SCREENING FECAL OCCULT BLOOD TESTING: ICD-10-CM

## 2023-09-27 DIAGNOSIS — I10 ESSENTIAL HYPERTENSION, BENIGN: ICD-10-CM

## 2023-09-27 DIAGNOSIS — F41.1 ANXIETY STATE: ICD-10-CM

## 2023-09-27 PROCEDURE — 3074F SYST BP LT 130 MM HG: CPT | Performed by: FAMILY MEDICINE

## 2023-09-27 PROCEDURE — 90686 IIV4 VACC NO PRSV 0.5 ML IM: CPT | Performed by: FAMILY MEDICINE

## 2023-09-27 PROCEDURE — G0008 ADMIN INFLUENZA VIRUS VAC: HCPCS | Performed by: FAMILY MEDICINE

## 2023-09-27 PROCEDURE — 99214 OFFICE O/P EST MOD 30 MIN: CPT | Performed by: FAMILY MEDICINE

## 2023-09-27 PROCEDURE — 3008F BODY MASS INDEX DOCD: CPT | Performed by: FAMILY MEDICINE

## 2023-09-27 PROCEDURE — 3079F DIAST BP 80-89 MM HG: CPT | Performed by: FAMILY MEDICINE

## 2023-10-01 DIAGNOSIS — F33.0 MILD RECURRENT MAJOR DEPRESSION (HCC): ICD-10-CM

## 2023-10-01 DIAGNOSIS — F41.1 ANXIETY STATE: ICD-10-CM

## 2023-10-02 RX ORDER — PAROXETINE HYDROCHLORIDE 40 MG/1
40 TABLET, FILM COATED ORAL EVERY MORNING
Qty: 30 TABLET | Refills: 5 | Status: SHIPPED | OUTPATIENT
Start: 2023-10-02

## 2023-10-02 NOTE — TELEPHONE ENCOUNTER
Paroxetine 40 mg  Filled 9-6-23  Qty 30  0 refills  Future Appointments   Date Time Provider Larisa Diallo   13/55/5347 91:68 PM Marcell Shields MD [facility]   LOV 9-27-23   RTC 6 mo.

## 2023-10-12 DIAGNOSIS — F41.1 ANXIETY STATE: ICD-10-CM

## 2023-10-12 RX ORDER — ALPRAZOLAM 0.5 MG/1
0.5 TABLET ORAL 3 TIMES DAILY PRN
Qty: 40 TABLET | Refills: 0 | Status: SHIPPED | OUTPATIENT
Start: 2023-10-12

## 2023-10-19 ENCOUNTER — PATIENT MESSAGE (OUTPATIENT)
Dept: RHEUMATOLOGY | Facility: CLINIC | Age: 58
End: 2023-10-19

## 2023-10-19 RX ORDER — HYDROCODONE BITARTRATE AND ACETAMINOPHEN 10; 325 MG/1; MG/1
TABLET ORAL
Qty: 180 TABLET | Refills: 0 | Status: SHIPPED | OUTPATIENT
Start: 2023-10-19 | End: 2023-10-20 | Stop reason: ALTCHOICE

## 2023-10-19 NOTE — TELEPHONE ENCOUNTER
Norco refill approved. 10 mg 1 every 4-6 hours. #180. Sent to [facility] [address] [facility] [address] in [place].   Dr. Miguel A Prince

## 2023-10-20 RX ORDER — OXYCODONE AND ACETAMINOPHEN 10; 325 MG/1; MG/1
1 TABLET ORAL
Qty: 150 TABLET | Refills: 0 | Status: SHIPPED | OUTPATIENT
Start: 2023-10-20

## 2023-10-20 NOTE — TELEPHONE ENCOUNTER
Patient is requesting Lanie Octave - due to supply issue. She states she will double her dose. Warned against liver effects of tylenol toxicity. Or Percocet's if appropriate she is fine with that too. LOV:   Future Appointments   Date Time Provider Larisa Diallo   10/26/2023 10:15 AM REF BBK REF EMG8 Ref BBK 8   10/26/2023 10:40 AM BBK [facility]LLA RM1 [facility] [place]   07/85/2484 14:80 PM Glynn Jackson MD EMGRHEUMHCECILN EM[facility] Ama     Labs: MAY need labs ordered.

## 2023-10-20 NOTE — TELEPHONE ENCOUNTER
Percocet ordered 10 mg 1 5 times a day as needed for pain #150 sent to [facility] in [place]. This is done in place of Norco 10 mg good is not available.   Dr. Andrea Castro

## 2023-10-26 ENCOUNTER — LAB ENCOUNTER (OUTPATIENT)
Dept: LAB | Age: 58
End: 2023-10-26
Attending: FAMILY MEDICINE

## 2023-10-26 ENCOUNTER — HOSPITAL ENCOUNTER (OUTPATIENT)
Dept: MAMMOGRAPHY | Age: 58
Discharge: HOME | End: 2023-10-26
Attending: FAMILY MEDICINE

## 2023-10-26 DIAGNOSIS — E78.00 PURE HYPERCHOLESTEROLEMIA: ICD-10-CM

## 2023-10-26 DIAGNOSIS — Z12.31 ENCOUNTER FOR SCREENING MAMMOGRAM FOR MALIGNANT NEOPLASM OF BREAST: ICD-10-CM

## 2023-10-26 LAB
ALBUMIN SERPL-MCNC: 3.8 G/DL (ref 3.4–5)
ALBUMIN/GLOB SERPL: 1 {RATIO} (ref 1–2)
ALP LIVER SERPL-CCNC: 57 U/L
ALT SERPL-CCNC: 17 U/L
ANION GAP SERPL CALC-SCNC: 6 MMOL/L (ref 0–18)
AST SERPL-CCNC: 13 U/L (ref 15–37)
BASOPHILS # BLD AUTO: 0.07 X10(3) UL (ref 0–0.2)
BASOPHILS NFR BLD AUTO: 1.2 %
BILIRUB SERPL-MCNC: 0.4 MG/DL (ref 0.1–2)
BUN BLD-MCNC: 26 MG/DL (ref 7–18)
CALCIUM BLD-MCNC: 9.8 MG/DL (ref 8.5–10.1)
CHLORIDE SERPL-SCNC: 103 MMOL/L (ref 98–112)
CHOLEST SERPL-MCNC: 119 MG/DL (ref ?–200)
CO2 SERPL-SCNC: 27 MMOL/L (ref 21–32)
CREAT BLD-MCNC: 1.37 MG/DL
CREAT UR-SCNC: 125 MG/DL
EGFRCR SERPLBLD CKD-EPI 2021: 44 ML/MIN/1.73M2 (ref 60–?)
EOSINOPHIL # BLD AUTO: 0.67 X10(3) UL (ref 0–0.7)
EOSINOPHIL NFR BLD AUTO: 11.6 %
ERYTHROCYTE [DISTWIDTH] IN BLOOD BY AUTOMATED COUNT: 15.8 %
EST. AVERAGE GLUCOSE BLD GHB EST-MCNC: 163 MG/DL (ref 68–126)
FASTING PATIENT LIPID ANSWER: YES
FASTING STATUS PATIENT QL REPORTED: YES
GLOBULIN PLAS-MCNC: 4 G/DL (ref 2.8–4.4)
GLUCOSE BLD-MCNC: 142 MG/DL (ref 70–99)
HBA1C MFR BLD: 7.3 % (ref ?–5.7)
HCT VFR BLD AUTO: 31.9 %
HDLC SERPL-MCNC: 42 MG/DL (ref 40–59)
HEMOCCULT STL QL: NEGATIVE
HGB BLD-MCNC: 10 G/DL
IMM GRANULOCYTES # BLD AUTO: 0.02 X10(3) UL (ref 0–1)
IMM GRANULOCYTES NFR BLD: 0.3 %
LDLC SERPL CALC-MCNC: 40 MG/DL (ref ?–100)
LYMPHOCYTES # BLD AUTO: 1.59 X10(3) UL (ref 1–4)
LYMPHOCYTES NFR BLD AUTO: 27.6 %
MCH RBC QN AUTO: 25.7 PG (ref 26–34)
MCHC RBC AUTO-ENTMCNC: 31.3 G/DL (ref 31–37)
MCV RBC AUTO: 82 FL
MICROALBUMIN UR-MCNC: 6.92 MG/DL
MICROALBUMIN/CREAT 24H UR-RTO: 55.4 UG/MG (ref ?–30)
MONOCYTES # BLD AUTO: 0.3 X10(3) UL (ref 0.1–1)
MONOCYTES NFR BLD AUTO: 5.2 %
NEUTROPHILS # BLD AUTO: 3.12 X10 (3) UL (ref 1.5–7.7)
NEUTROPHILS # BLD AUTO: 3.12 X10(3) UL (ref 1.5–7.7)
NEUTROPHILS NFR BLD AUTO: 54.1 %
NONHDLC SERPL-MCNC: 77 MG/DL (ref ?–130)
OSMOLALITY SERPL CALC.SUM OF ELEC: 289 MOSM/KG (ref 275–295)
PLATELET # BLD AUTO: 218 10(3)UL (ref 150–450)
POTASSIUM SERPL-SCNC: 3.4 MMOL/L (ref 3.5–5.1)
PROT SERPL-MCNC: 7.8 G/DL (ref 6.4–8.2)
RBC # BLD AUTO: 3.89 X10(6)UL
SODIUM SERPL-SCNC: 136 MMOL/L (ref 136–145)
TRIGL SERPL-MCNC: 240 MG/DL (ref 30–149)
VLDLC SERPL CALC-MCNC: 33 MG/DL (ref 0–30)
WBC # BLD AUTO: 5.8 X10(3) UL (ref 4–11)

## 2023-10-26 PROCEDURE — 83036 HEMOGLOBIN GLYCOSYLATED A1C: CPT | Performed by: FAMILY MEDICINE

## 2023-10-26 PROCEDURE — 82274 ASSAY TEST FOR BLOOD FECAL: CPT | Performed by: FAMILY MEDICINE

## 2023-10-26 PROCEDURE — 36415 COLL VENOUS BLD VENIPUNCTURE: CPT | Performed by: FAMILY MEDICINE

## 2023-10-26 PROCEDURE — 80053 COMPREHEN METABOLIC PANEL: CPT | Performed by: FAMILY MEDICINE

## 2023-10-26 PROCEDURE — 80061 LIPID PANEL: CPT

## 2023-10-26 PROCEDURE — 77067 SCR MAMMO BI INCL CAD: CPT | Performed by: FAMILY MEDICINE

## 2023-10-26 PROCEDURE — 85025 COMPLETE CBC W/AUTO DIFF WBC: CPT | Performed by: FAMILY MEDICINE

## 2023-10-26 PROCEDURE — 77063 BREAST TOMOSYNTHESIS BI: CPT | Performed by: FAMILY MEDICINE

## 2023-10-26 PROCEDURE — 82043 UR ALBUMIN QUANTITATIVE: CPT | Performed by: FAMILY MEDICINE

## 2023-10-26 PROCEDURE — 82570 ASSAY OF URINE CREATININE: CPT | Performed by: FAMILY MEDICINE

## 2023-10-30 ENCOUNTER — PATIENT MESSAGE (OUTPATIENT)
Dept: INTERNAL MEDICINE CLINIC | Facility: CLINIC | Age: 58
End: 2023-10-30

## 2023-10-30 ENCOUNTER — TELEPHONE (OUTPATIENT)
Dept: INTERNAL MEDICINE CLINIC | Facility: CLINIC | Age: 58
End: 2023-10-30

## 2023-10-30 DIAGNOSIS — E87.6 LOW BLOOD POTASSIUM: ICD-10-CM

## 2023-10-30 DIAGNOSIS — R80.9 MICROALBUMINURIA: ICD-10-CM

## 2023-10-30 DIAGNOSIS — R79.89 ELEVATED SERUM CREATININE: ICD-10-CM

## 2023-10-30 DIAGNOSIS — E11.9 TYPE 2 DIABETES MELLITUS WITHOUT COMPLICATION, WITHOUT LONG-TERM CURRENT USE OF INSULIN (HCC): ICD-10-CM

## 2023-10-30 DIAGNOSIS — D64.9 LOW HEMOGLOBIN AND LOW HEMATOCRIT: Primary | ICD-10-CM

## 2023-10-30 RX ORDER — POTASSIUM CHLORIDE 1500 MG/1
20 TABLET, EXTENDED RELEASE ORAL 2 TIMES DAILY
Qty: 8 TABLET | Refills: 0 | Status: SHIPPED | OUTPATIENT
Start: 2023-10-30 | End: 2023-11-03

## 2023-10-30 NOTE — TELEPHONE ENCOUNTER
From: Belen Berkowitz  To: Mere Lowe  Sent: 10/30/2023 1:26 PM CDT  Subject: Immunizations     FYI - on October 28th i got my TDap and latest Covid vaccines of you would kindly put info in my records. Thank you.   Darci Rodarte

## 2023-10-30 NOTE — TELEPHONE ENCOUNTER
----- Message from HEAVENLY Godoy sent at 10/26/2023  5:41 PM CDT -----  Glucose 142 and Hgba1c 7. 3. down a little. Pt to watch the carbs in the diet, take DM meds and stay active. Recheck hgba1c, CMP in 6 months. Micro albumin up a little. Pt to work on making sure BP and DM stay controled. Pt should  also watch  NSAID use, protein shakes, red meat, and high protein diets these things can also cause an elevation in the microalbumin. Recheck in 6 months  HGB and hematocrit low. Pt needs to see hematology- Dr. Sinan Ware. Potassium low. Pt needs K-dur 20 herminia one bid x 4 days. Repeat potassium level in 1 week. Kidneys stable. Rest of labs negative.

## 2023-11-02 RX ORDER — ONDANSETRON 4 MG/1
4 TABLET, ORALLY DISINTEGRATING ORAL EVERY 8 HOURS PRN
Qty: 30 TABLET | Refills: 0 | Status: SHIPPED | OUTPATIENT
Start: 2023-11-02

## 2023-11-02 NOTE — TELEPHONE ENCOUNTER
Future Appointments   Date Time Provider Larisa Diallo   11/10/2023  9:00 AM REF HND SCHEDULED RESOURCE REFEMMGHND REFHND   11/15/2023 10:30 AM Felisa Glynn [facility]   85/62/4499 35:68 PM Alessia Mcrae MD [facility] [place]     Last office visit: 8/17/2023    Last fill: 9/6/2023 30 tab, 1 refills

## 2023-11-05 DIAGNOSIS — E11.9 TYPE 2 DIABETES MELLITUS WITHOUT COMPLICATION, WITHOUT LONG-TERM CURRENT USE OF INSULIN (HCC): ICD-10-CM

## 2023-11-05 DIAGNOSIS — I15.2 HYPERTENSION ASSOCIATED WITH TYPE 2 DIABETES MELLITUS: ICD-10-CM

## 2023-11-05 DIAGNOSIS — E11.59 HYPERTENSION ASSOCIATED WITH TYPE 2 DIABETES MELLITUS: ICD-10-CM

## 2023-11-06 RX ORDER — LOSARTAN POTASSIUM 100 MG/1
100 TABLET ORAL DAILY
Qty: 90 TABLET | Refills: 1 | Status: SHIPPED | OUTPATIENT
Start: 2023-11-06

## 2023-11-06 RX ORDER — HYDROCHLOROTHIAZIDE 25 MG/1
25 TABLET ORAL DAILY
Qty: 90 TABLET | Refills: 1 | Status: SHIPPED | OUTPATIENT
Start: 2023-11-06

## 2023-11-06 RX ORDER — ROSUVASTATIN CALCIUM 10 MG/1
10 TABLET, COATED ORAL NIGHTLY
Qty: 90 TABLET | Refills: 1 | Status: SHIPPED | OUTPATIENT
Start: 2023-11-06

## 2023-11-07 DIAGNOSIS — E11.9 TYPE 2 DIABETES MELLITUS WITHOUT COMPLICATION, WITHOUT LONG-TERM CURRENT USE OF INSULIN (HCC): ICD-10-CM

## 2023-11-08 RX ORDER — GLIMEPIRIDE 4 MG/1
4 TABLET ORAL
Qty: 90 TABLET | Refills: 1 | Status: SHIPPED | OUTPATIENT
Start: 2023-11-08

## 2023-11-08 NOTE — TELEPHONE ENCOUNTER
Glimepiride 4 mg  Filled 8-7-23  Qty 90  0 refills  Future Appointments   Date Time Provider Larisa Diallo   11/10/2023  9:00 AM REF HND SCHEDULED RESOURCE REFEMMGHND REFHND   11/15/2023 10:30 AM Daniel Bush MD [address] [facility] HEM [facility] [place]   67/07/0439 59:37 PM Anne Bernal MD EM[facility]UM[facility]BSN EM[facility] 9-27-23 [facility]  Labs 10-26-23 A1c/ microalb./creat.

## 2023-11-10 ENCOUNTER — LAB ENCOUNTER (OUTPATIENT)
Dept: LAB | Facility: REFERENCE LAB | Age: 58
End: 2023-11-10
Attending: FAMILY MEDICINE
Payer: MEDICARE

## 2023-11-10 DIAGNOSIS — E11.9 TYPE 2 DIABETES MELLITUS WITHOUT COMPLICATION, WITHOUT LONG-TERM CURRENT USE OF INSULIN (HCC): ICD-10-CM

## 2023-11-10 DIAGNOSIS — R79.89 ELEVATED SERUM CREATININE: ICD-10-CM

## 2023-11-10 DIAGNOSIS — E87.6 LOW BLOOD POTASSIUM: ICD-10-CM

## 2023-11-10 LAB
ALBUMIN SERPL-MCNC: 4.3 G/DL (ref 3.2–4.8)
ALBUMIN/GLOB SERPL: 1.6 {RATIO} (ref 1–2)
ALP LIVER SERPL-CCNC: 63 U/L
ALT SERPL-CCNC: 10 U/L
ANION GAP SERPL CALC-SCNC: 10 MMOL/L (ref 0–18)
AST SERPL-CCNC: 20 U/L (ref ?–34)
BILIRUB SERPL-MCNC: 0.3 MG/DL (ref 0.2–1.1)
BUN BLD-MCNC: 21 MG/DL (ref 9–23)
BUN/CREAT SERPL: 13 (ref 10–20)
CALCIUM BLD-MCNC: 8.9 MG/DL (ref 8.7–10.4)
CHLORIDE SERPL-SCNC: 107 MMOL/L (ref 98–112)
CO2 SERPL-SCNC: 24 MMOL/L (ref 21–32)
CREAT BLD-MCNC: 1.61 MG/DL
EGFRCR SERPLBLD CKD-EPI 2021: 36 ML/MIN/1.73M2 (ref 60–?)
EST. AVERAGE GLUCOSE BLD GHB EST-MCNC: 160 MG/DL (ref 68–126)
FASTING STATUS PATIENT QL REPORTED: YES
GLOBULIN PLAS-MCNC: 2.7 G/DL (ref 2.8–4.4)
GLUCOSE BLD-MCNC: 154 MG/DL (ref 70–99)
HBA1C MFR BLD: 7.2 % (ref ?–5.7)
OSMOLALITY SERPL CALC.SUM OF ELEC: 298 MOSM/KG (ref 275–295)
POTASSIUM SERPL-SCNC: 3.8 MMOL/L (ref 3.5–5.1)
PROT SERPL-MCNC: 7 G/DL (ref 5.7–8.2)
SODIUM SERPL-SCNC: 141 MMOL/L (ref 136–145)

## 2023-11-10 PROCEDURE — 80053 COMPREHEN METABOLIC PANEL: CPT

## 2023-11-10 PROCEDURE — 83036 HEMOGLOBIN GLYCOSYLATED A1C: CPT

## 2023-11-10 PROCEDURE — 36415 COLL VENOUS BLD VENIPUNCTURE: CPT

## 2023-11-12 DIAGNOSIS — F41.1 ANXIETY STATE: ICD-10-CM

## 2023-11-12 RX ORDER — ALPRAZOLAM 0.5 MG/1
0.5 TABLET ORAL 3 TIMES DAILY PRN
Qty: 40 TABLET | Refills: 0 | Status: SHIPPED | OUTPATIENT
Start: 2023-11-12

## 2023-11-14 ENCOUNTER — TELEPHONE (OUTPATIENT)
Dept: INTERNAL MEDICINE CLINIC | Facility: CLINIC | Age: 58
End: 2023-11-14

## 2023-11-14 DIAGNOSIS — E11.9 TYPE 2 DIABETES MELLITUS WITHOUT COMPLICATION, WITHOUT LONG-TERM CURRENT USE OF INSULIN (HCC): Primary | ICD-10-CM

## 2023-11-14 NOTE — TELEPHONE ENCOUNTER
----- Message from HEAVENLY Rossi sent at 11/12/2023  6:47 PM CST -----  Hgba1c down a little. Pt to continue to work on her diet, exercise and taking her DM meds. Pt keeping her DM and HTN under control will help her kidney functions. Will continue to monitor. Recheck in 6 months.

## 2023-11-15 ENCOUNTER — OFFICE VISIT (OUTPATIENT)
Dept: HEMATOLOGY/ONCOLOGY | Facility: HOSPITAL | Age: 58
End: 2023-11-15
Attending: INTERNAL MEDICINE
Payer: MEDICARE

## 2023-11-15 ENCOUNTER — TELEPHONE (OUTPATIENT)
Dept: HEMATOLOGY/ONCOLOGY | Facility: HOSPITAL | Age: 58
End: 2023-11-15

## 2023-11-15 ENCOUNTER — OFFICE VISIT (OUTPATIENT)
Dept: RHEUMATOLOGY | Facility: CLINIC | Age: 58
End: 2023-11-15
Payer: MEDICARE

## 2023-11-15 VITALS
DIASTOLIC BLOOD PRESSURE: 71 MMHG | BODY MASS INDEX: 35 KG/M2 | RESPIRATION RATE: 20 BRPM | SYSTOLIC BLOOD PRESSURE: 130 MMHG | TEMPERATURE: 97 F | WEIGHT: 206.63 LBS | OXYGEN SATURATION: 98 % | HEART RATE: 120 BPM

## 2023-11-15 VITALS
TEMPERATURE: 99 F | SYSTOLIC BLOOD PRESSURE: 100 MMHG | HEART RATE: 112 BPM | OXYGEN SATURATION: 96 % | WEIGHT: 207 LBS | BODY MASS INDEX: 35.34 KG/M2 | RESPIRATION RATE: 18 BRPM | DIASTOLIC BLOOD PRESSURE: 64 MMHG | HEIGHT: 64 IN

## 2023-11-15 DIAGNOSIS — M16.11 PRIMARY OSTEOARTHRITIS OF RIGHT HIP: ICD-10-CM

## 2023-11-15 DIAGNOSIS — M17.0 PRIMARY OSTEOARTHRITIS OF BOTH KNEES: Primary | ICD-10-CM

## 2023-11-15 DIAGNOSIS — D64.9 ANEMIA, UNSPECIFIED TYPE: Primary | ICD-10-CM

## 2023-11-15 DIAGNOSIS — D50.9 IRON DEFICIENCY ANEMIA, UNSPECIFIED IRON DEFICIENCY ANEMIA TYPE: ICD-10-CM

## 2023-11-15 DIAGNOSIS — Z96.642 HISTORY OF TOTAL LEFT HIP REPLACEMENT: ICD-10-CM

## 2023-11-15 PROBLEM — D64.89 OTHER SPECIFIED ANEMIAS: Status: ACTIVE | Noted: 2023-11-15

## 2023-11-15 LAB
DEPRECATED HBV CORE AB SER IA-ACNC: 10.3 NG/ML
FOLATE SERPL-MCNC: 8.1 NG/ML (ref 8.7–?)
HAPTOGLOB SERPL-MCNC: 189 MG/DL (ref 30–200)
HGB RETIC QN AUTO: 31.1 PG (ref 28.2–36.6)
IMM RETICS NFR: 0.2 RATIO (ref 0.1–0.3)
IRON SATN MFR SERPL: 8 %
IRON SERPL-MCNC: 41 UG/DL
LDH SERPL L TO P-CCNC: 174 U/L
RETICS # AUTO: 37.4 X10(3) UL (ref 22.5–147.5)
RETICS/RBC NFR AUTO: 1 %
TIBC SERPL-MCNC: 523 UG/DL (ref 240–450)
TRANSFERRIN SERPL-MCNC: 351 MG/DL (ref 200–360)
VIT B12 SERPL-MCNC: 274 PG/ML (ref 193–986)

## 2023-11-15 PROCEDURE — 3075F SYST BP GE 130 - 139MM HG: CPT | Performed by: INTERNAL MEDICINE

## 2023-11-15 PROCEDURE — 3008F BODY MASS INDEX DOCD: CPT | Performed by: INTERNAL MEDICINE

## 2023-11-15 PROCEDURE — 3078F DIAST BP <80 MM HG: CPT | Performed by: INTERNAL MEDICINE

## 2023-11-15 PROCEDURE — 99205 OFFICE O/P NEW HI 60 MIN: CPT | Performed by: INTERNAL MEDICINE

## 2023-11-15 PROCEDURE — 99214 OFFICE O/P EST MOD 30 MIN: CPT | Performed by: INTERNAL MEDICINE

## 2023-11-15 PROCEDURE — 3074F SYST BP LT 130 MM HG: CPT | Performed by: INTERNAL MEDICINE

## 2023-11-15 RX ORDER — CETIRIZINE HYDROCHLORIDE 10 MG/1
10 TABLET ORAL DAILY
Qty: 30 TABLET | Refills: 3 | Status: SHIPPED | OUTPATIENT
Start: 2023-11-15

## 2023-11-15 RX ORDER — OXYCODONE AND ACETAMINOPHEN 10; 325 MG/1; MG/1
1 TABLET ORAL
Qty: 150 TABLET | Refills: 0 | Status: SHIPPED | OUTPATIENT
Start: 2023-11-19

## 2023-11-15 RX ORDER — OXYCODONE AND ACETAMINOPHEN 10; 325 MG/1; MG/1
1 TABLET ORAL
Qty: 150 TABLET | Refills: 0 | Status: SHIPPED | OUTPATIENT
Start: 2023-12-19

## 2023-11-15 RX ORDER — HYDROCODONE BITARTRATE AND ACETAMINOPHEN 10; 325 MG/1; MG/1
1-2 TABLET ORAL EVERY 6 HOURS PRN
Qty: 180 TABLET | Refills: 0 | Status: CANCELLED | OUTPATIENT
Start: 2023-12-18 | End: 2024-01-17

## 2023-11-15 RX ORDER — HYDROCODONE BITARTRATE AND ACETAMINOPHEN 10; 325 MG/1; MG/1
1-2 TABLET ORAL EVERY 6 HOURS PRN
Qty: 180 TABLET | Refills: 0 | Status: CANCELLED | OUTPATIENT
Start: 2023-11-18 | End: 2023-12-18

## 2023-11-15 RX ORDER — OXYCODONE AND ACETAMINOPHEN 10; 325 MG/1; MG/1
1 TABLET ORAL
Qty: 150 TABLET | Refills: 0 | Status: SHIPPED | OUTPATIENT
Start: 2024-01-18

## 2023-11-15 RX ORDER — HYDROCODONE BITARTRATE AND ACETAMINOPHEN 10; 325 MG/1; MG/1
1-2 TABLET ORAL EVERY 6 HOURS PRN
Qty: 180 TABLET | Refills: 0 | Status: CANCELLED | OUTPATIENT
Start: 2024-01-17 | End: 2024-02-16

## 2023-11-15 RX ORDER — FOLIC ACID 1 MG/1
1 TABLET ORAL DAILY
Qty: 30 TABLET | Refills: 0 | Status: SHIPPED | OUTPATIENT
Start: 2023-11-15

## 2023-11-15 NOTE — PATIENT INSTRUCTIONS
Percocet 10 mg/325mg for pain one every 4-6 hours up to 5 per day. Zyrtec for itching one in the morning. Benadrytl 25 mg as needed later in the day if needed. Aquaphor moisturizing cream or Cetaphil thein cream.   Return to office 3 months.

## 2023-11-15 NOTE — PROGRESS NOTES
Education Record    Learner:  Patient and Family Member    Disease / Althea Shows    Barriers / Limitations:  None   Comments:    Method:  Discussion   Comments:    General Topics:  Plan of care reviewed   Comments:    Outcome:  Shows understanding   Comments:    Patient here as a new consult. Energy levels are poor. States she is lightheaded and experiencing dyspnea. Recently performed cologuard. Denies any bleeding or bruising.

## 2023-11-16 ENCOUNTER — TELEPHONE (OUTPATIENT)
Dept: HEMATOLOGY/ONCOLOGY | Facility: HOSPITAL | Age: 58
End: 2023-11-16

## 2023-11-17 LAB
ALBUMIN SERPL ELPH-MCNC: 3.97 G/DL (ref 3.75–5.21)
ALBUMIN/GLOB SERPL: 1.23 {RATIO} (ref 1–2)
ALPHA1 GLOB SERPL ELPH-MCNC: 0.28 G/DL (ref 0.19–0.46)
ALPHA2 GLOB SERPL ELPH-MCNC: 0.99 G/DL (ref 0.48–1.05)
B-GLOBULIN SERPL ELPH-MCNC: 0.94 G/DL (ref 0.68–1.23)
GAMMA GLOB SERPL ELPH-MCNC: 1.02 G/DL (ref 0.62–1.7)
KAPPA LC FREE SER-MCNC: 5.75 MG/DL (ref 0.33–1.94)
KAPPA LC FREE/LAMBDA FREE SER NEPH: 1.7 {RATIO} (ref 0.26–1.65)
LAMBDA LC FREE SERPL-MCNC: 3.38 MG/DL (ref 0.57–2.63)
PROT SERPL-MCNC: 7.2 G/DL (ref 5.7–8.2)

## 2023-11-29 RX ORDER — ONDANSETRON 4 MG/1
4 TABLET, ORALLY DISINTEGRATING ORAL EVERY 8 HOURS PRN
Qty: 60 TABLET | Refills: 1 | Status: SHIPPED | OUTPATIENT
Start: 2023-11-29

## 2023-11-29 NOTE — TELEPHONE ENCOUNTER
LOV: 11/15/2023    RTC: 3 months   Future Appointments   Date Time Provider Larisa Diallo   11/30/2023  1:00 PM [address] [place]   2/8/2024  1:00 PM Candice Virk MD [address]   6/28/5491 13:68 AM Ilir Strickland MD [facility] EM[facility]   LABS:   Component      Latest Ref Rng 10/26/2023   WBC      4.0 - 11.0 x10(3) uL 5.8    RBC      3.80 - 5.30 x10(6)uL 3.89    Hemoglobin      12.0 - 16.0 g/dL 10.0 (L)    Hematocrit      35.0 - 48.0 % 31.9 (L)    Platelet Count      799.5 - 450.0 10(3)uL 218.0    MCV      80.0 - 100.0 fL 82.0    MCH      26.0 - 34.0 pg 25.7 (L)    MCHC      31.0 - 37.0 g/dL 31.3    RDW      % 15.8    Prelim Neutrophil Abs      1.50 - 7.70 x10 (3) uL 3.12    Neutrophils Absolute      1.50 - 7.70 x10(3) uL 3.12    Lymphocytes Absolute      1.00 - 4.00 x10(3) uL 1.59    Monocytes Absolute      0.10 - 1.00 x10(3) uL 0.30    Eosinophils Absolute      0.00 - 0.70 x10(3) uL 0.67    Basophils Absolute      0.00 - 0.20 x10(3) uL 0.07    Immature Granulocyte Absolute      0.00 - 1.00 x10(3) uL 0.02    Neutrophils %      % 54.1    Lymphocytes %      % 27.6    Monocytes %      % 5.2    Eosinophils %      % 11.6    Basophils %      % 1.2    Immature Granulocyte %      % 0.3       Legend:  (L) Low  Component      Latest Ref Rng 11/10/2023   Glucose      70 - 99 mg/dL 154 (H)    Sodium      136 - 145 mmol/L 141    Potassium      3.5 - 5.1 mmol/L 3.8    Chloride      98 - 112 mmol/L 107    Carbon Dioxide, Total      21.0 - 32.0 mmol/L 24.0    ANION GAP      0 - 18 mmol/L 10    BUN      9 - 23 mg/dL 21    CREATININE      0.55 - 1.02 mg/dL 1.61 (H)    BUN/CREATININE RATIO      10.0 - 20.0  13.0    CALCIUM      8.7 - 10.4 mg/dL 8.9    CALCULATED OSMOLALITY      275 - 295 mOsm/kg 298 (H)    EGFR      >=60 mL/min/1.73m2 36 (L)    ALT (SGPT)      10 - 49 U/L 10    AST (SGOT)      <=34 U/L 20    ALKALINE PHOSPHATASE      46 - 118 U/L 63    Total Bilirubin      0.2 - 1.1 mg/dL 0.3 PROTEIN, TOTAL      5.7 - 8.2 g/dL 7.0    Albumin      3.2 - 4.8 g/dL 4.3    Globulin      2.8 - 4.4 g/dL 2.7 (L)    A/G Ratio      1.0 - 2.0  1.6    Patient Fasting for CMP? Yes       Legend:  (H) High  (L) Low  Component      Latest Ref Rng 11/15/2023   Iron, Serum      50 - 170 ug/dL 41 (L)    Transferrin      200 - 360 mg/dL 351    Iron Bind. Cap.(TIBC)      240 - 450 ug/dL 523 (H)    Iron Saturation      15 - 50 % 8 (L)       Legend:  (L) Low  (H) High  Component      Latest Ref Rng 11/15/2023   Vitamin B12      193 - 986 pg/mL 274        Component      Latest Ref Rng 23/35/4587   FOLATE (FOLIC ACID), SERUM      >=8.7 ng/mL 8.1 (L)       Legend:  (L) Low    LAST REFILL: 11/2/2023, Quantity 30 tablets, Refill 0     Dr aRmin Sarabia-- orders pending, approve if agreeable.

## 2023-11-30 ENCOUNTER — OFFICE VISIT (OUTPATIENT)
Dept: HEMATOLOGY/ONCOLOGY | Facility: HOSPITAL | Age: 58
End: 2023-11-30
Attending: INTERNAL MEDICINE
Payer: MEDICARE

## 2023-11-30 VITALS
HEART RATE: 85 BPM | DIASTOLIC BLOOD PRESSURE: 68 MMHG | RESPIRATION RATE: 18 BRPM | SYSTOLIC BLOOD PRESSURE: 113 MMHG | OXYGEN SATURATION: 99 % | TEMPERATURE: 98 F

## 2023-11-30 DIAGNOSIS — D64.89 ANEMIA DUE TO OTHER CAUSE, NOT CLASSIFIED: Primary | ICD-10-CM

## 2023-11-30 PROCEDURE — 96376 TX/PRO/DX INJ SAME DRUG ADON: CPT

## 2023-11-30 PROCEDURE — 96365 THER/PROPH/DIAG IV INF INIT: CPT

## 2023-11-30 NOTE — PROGRESS NOTES
Pt here for infed . Pt denies any issues or concerns. Ordering MD: Jon  Order Exp: once     Pt tolerated infusion without difficulty or complaint.  Reviewed next apt date/time:       Education Record  Learner:  Patient and Family Member  Disease / Diagnosis: JOSE  Barriers / Limitations:  None  Method:  Brief focused  General Topics:  Plan of care reviewed  Outcome:  Shows understanding

## 2023-12-10 DIAGNOSIS — F41.1 ANXIETY STATE: ICD-10-CM

## 2023-12-11 RX ORDER — FOLIC ACID 1 MG/1
1 TABLET ORAL DAILY
Qty: 30 TABLET | Refills: 3 | Status: SHIPPED | OUTPATIENT
Start: 2023-12-11

## 2023-12-11 RX ORDER — ALPRAZOLAM 0.5 MG/1
0.5 TABLET ORAL 3 TIMES DAILY PRN
Qty: 40 TABLET | Refills: 0 | Status: SHIPPED | OUTPATIENT
Start: 2023-12-11

## 2023-12-11 NOTE — TELEPHONE ENCOUNTER
Alprazolam 0.5 mg  Filled 11-12-23  Qty 40  0 refills  Future Appointments   Date Time Provider [facility] Imani   2/8/2024  1:00 PM Mayo Yao MD [facility]   1/19/4032 89:09 AM Geraldine Baer MD EMGR[facility]UM[facility]BSN EMG [facility] 9-27-23

## 2024-01-10 DIAGNOSIS — F41.1 ANXIETY STATE: ICD-10-CM

## 2024-01-10 RX ORDER — ALPRAZOLAM 0.5 MG/1
0.5 TABLET ORAL 3 TIMES DAILY PRN
Qty: 40 TABLET | Refills: 0 | Status: SHIPPED | OUTPATIENT
Start: 2024-01-10

## 2024-01-10 NOTE — TELEPHONE ENCOUNTER
Alprazolam 0.5 mg  Filled 12-11-23  Qty 40  0 refills  Future Appointments   Date Time Provider Department Center   2/8/2024  1:00 PM Jesus Webb MD  [facility]   2/13/2024 11:00 AM Wayne Negrete MD EMGEUBSN [facility] [place] 9-27-23

## 2024-02-06 DIAGNOSIS — F41.1 ANXIETY STATE: ICD-10-CM

## 2024-02-06 RX ORDER — ALPRAZOLAM 0.5 MG/1
0.5 TABLET ORAL 3 TIMES DAILY PRN
Qty: 40 TABLET | Refills: 0 | Status: SHIPPED | OUTPATIENT
Start: 2024-02-06

## 2024-02-06 NOTE — TELEPHONE ENCOUNTER
Alprazolam 0.5 mg  Filled 1-10-24  Qty 40  0 refills  Future Appointments   Date Time Provider Department Center   2/13/2024 11:00 AM [facility]

## 2024-02-08 ENCOUNTER — APPOINTMENT (OUTPATIENT)
Dept: HEMATOLOGY/ONCOLOGY | Facility: HOSPITAL | Age: 59
End: 2024-02-08
Attending: INTERNAL MEDICINE
Payer: MEDICARE

## 2024-02-13 ENCOUNTER — OFFICE VISIT (OUTPATIENT)
Dept: RHEUMATOLOGY | Facility: CLINIC | Age: 59
End: 2024-02-13
Payer: MEDICARE

## 2024-02-13 VITALS
BODY MASS INDEX: 34.31 KG/M2 | SYSTOLIC BLOOD PRESSURE: 114 MMHG | HEART RATE: 122 BPM | RESPIRATION RATE: 16 BRPM | WEIGHT: 201 LBS | TEMPERATURE: 99 F | OXYGEN SATURATION: 99 % | HEIGHT: 64 IN | DIASTOLIC BLOOD PRESSURE: 76 MMHG

## 2024-02-13 DIAGNOSIS — M16.11 PRIMARY OSTEOARTHRITIS OF ONE HIP, RIGHT: ICD-10-CM

## 2024-02-13 DIAGNOSIS — M47.896 OTHER OSTEOARTHRITIS OF SPINE, LUMBAR REGION: ICD-10-CM

## 2024-02-13 DIAGNOSIS — M17.0 PRIMARY OSTEOARTHRITIS OF BOTH KNEES: Primary | ICD-10-CM

## 2024-02-13 PROCEDURE — 3074F SYST BP LT 130 MM HG: CPT | Performed by: INTERNAL MEDICINE

## 2024-02-13 PROCEDURE — 3078F DIAST BP <80 MM HG: CPT | Performed by: INTERNAL MEDICINE

## 2024-02-13 PROCEDURE — 99214 OFFICE O/P EST MOD 30 MIN: CPT | Performed by: INTERNAL MEDICINE

## 2024-02-13 PROCEDURE — 3008F BODY MASS INDEX DOCD: CPT | Performed by: INTERNAL MEDICINE

## 2024-02-13 RX ORDER — HYDROCODONE BITARTRATE AND ACETAMINOPHEN 10; 325 MG/1; MG/1
1 TABLET ORAL EVERY 4 HOURS PRN
Qty: 180 TABLET | Refills: 0 | Status: SHIPPED | OUTPATIENT
Start: 2024-02-15

## 2024-02-13 RX ORDER — OXYCODONE AND ACETAMINOPHEN 10; 325 MG/1; MG/1
1 TABLET ORAL
Qty: 150 TABLET | Refills: 0 | Status: CANCELLED | OUTPATIENT
Start: 2024-02-13

## 2024-02-13 RX ORDER — HYDROCODONE BITARTRATE AND ACETAMINOPHEN 10; 325 MG/1; MG/1
1 TABLET ORAL EVERY 4 HOURS PRN
Qty: 180 TABLET | Refills: 0 | Status: SHIPPED | OUTPATIENT
Start: 2024-04-15

## 2024-02-13 RX ORDER — HYDROCODONE BITARTRATE AND ACETAMINOPHEN 10; 325 MG/1; MG/1
1 TABLET ORAL EVERY 4 HOURS PRN
Qty: 180 TABLET | Refills: 0 | Status: SHIPPED | OUTPATIENT
Start: 2024-03-16

## 2024-02-13 NOTE — PROGRESS NOTES
[facility]  Dr. Negrete Progress Note     Subjective:   Lizbeth Berkowitz is a(n) 58 year old female.   Current complaints:   Chief Complaint   Patient presents with    Follow - Up     Primary osteoarthritis - Pt is feeling worse in knees and cramping in both hands since11/15/23 visit  Percocet not as effective as Pt would like   History of multiple joints with osteoarthritis, knees, right hip. .  S/P L THR,      Feeling worse.  Replaced left hip ok.  Debating to do the left knee next.  Sees Dr. Candelario Barrios of [facility].  Stress with huband's diabetes.  Diabetes medication super expensive.  Prefers Norco to Percocet.   Objective:   /76   Pulse (!) 122   Temp 98.7 °F (37.1 °C) (Tympanic)   Resp 16   Ht 5' 4\" (1.626 m)   Wt 201 lb (91.2 kg)   LMP 11/06/2015   SpO2 99%   BMI 34.50 kg/m²   Lungs clear  Heart nsr   Knees  tender   Walking with a cane.   Assessment:     Encounter Diagnoses   Name Primary?    Primary osteoarthritis of both knees Yes    Other osteoarthritis of spine, lumbar region     Primary osteoarthritis of one hip, right      Plan:     Patient Instructions   Norco 10 mg every 4 hours as needed for pain.  Xanax for stress s needed.  Zofran as needed.  Return to office 3 months.          Wayne Negrete MD 2/13/2024 11:21 AM

## 2024-02-13 NOTE — PATIENT INSTRUCTIONS
Norco 10 mg every 4 hours as needed for pain.  Xanax for stress s needed.  Zofran as needed.  Return to office 3 months.

## 2024-02-15 ENCOUNTER — PATIENT OUTREACH (OUTPATIENT)
Dept: INTERNAL MEDICINE CLINIC | Facility: CLINIC | Age: 59
End: 2024-02-15

## 2024-02-15 NOTE — PROGRESS NOTES
Sched. Appt.    Future Appointments   Date Time Provider Department Center   2/22/2024 11:30 AM Meera Navarrete APRN EMG 8 [facility] [place]   5/14/2024  1:00 PM Wayne Negrete MD EMGRHEUM[facility]BSN EM[facility] [place]

## 2024-02-28 PROBLEM — E66.812 CLASS 2 SEVERE OBESITY DUE TO EXCESS CALORIES WITH SERIOUS COMORBIDITY IN ADULT (HCC): Status: RESOLVED | Noted: 2020-09-15 | Resolved: 2024-02-28

## 2024-02-28 PROBLEM — E66.01 CLASS 2 SEVERE OBESITY DUE TO EXCESS CALORIES WITH SERIOUS COMORBIDITY IN ADULT (HCC): Status: RESOLVED | Noted: 2020-09-15 | Resolved: 2024-02-28

## 2024-02-28 PROBLEM — E66.811 OBESITY (BMI 30.0-34.9): Status: ACTIVE | Noted: 2024-02-28

## 2024-02-28 PROBLEM — E66.01 SEVERE OBESITY (BMI 35.0-39.9) WITH COMORBIDITY (HCC): Chronic | Status: RESOLVED | Noted: 2020-09-15 | Resolved: 2024-02-28

## 2024-02-28 PROBLEM — E55.9 VITAMIN D DEFICIENCY: Status: ACTIVE | Noted: 2024-02-28

## 2024-02-28 PROBLEM — E66.9 OBESITY (BMI 30.0-34.9): Status: ACTIVE | Noted: 2024-02-28

## 2024-03-05 ENCOUNTER — OFFICE VISIT (OUTPATIENT)
Dept: INTERNAL MEDICINE CLINIC | Facility: CLINIC | Age: 59
End: 2024-03-05
Payer: MEDICARE

## 2024-03-05 ENCOUNTER — LAB ENCOUNTER (OUTPATIENT)
Dept: LAB | Age: 59
End: 2024-03-05
Attending: FAMILY MEDICINE
Payer: MEDICARE

## 2024-03-05 VITALS
WEIGHT: 198.63 LBS | HEART RATE: 128 BPM | TEMPERATURE: 97 F | OXYGEN SATURATION: 97 % | BODY MASS INDEX: 33.91 KG/M2 | SYSTOLIC BLOOD PRESSURE: 130 MMHG | RESPIRATION RATE: 16 BRPM | DIASTOLIC BLOOD PRESSURE: 82 MMHG | HEIGHT: 64 IN

## 2024-03-05 DIAGNOSIS — E55.9 VITAMIN D DEFICIENCY: ICD-10-CM

## 2024-03-05 DIAGNOSIS — M16.0 PRIMARY OSTEOARTHRITIS OF HIPS, BILATERAL: ICD-10-CM

## 2024-03-05 DIAGNOSIS — Z78.0 POSTMENOPAUSAL ESTROGEN DEFICIENCY: ICD-10-CM

## 2024-03-05 DIAGNOSIS — M48.02 STENOSIS OF CERVICAL SPINE: ICD-10-CM

## 2024-03-05 DIAGNOSIS — F17.200 CURRENT SMOKER: ICD-10-CM

## 2024-03-05 DIAGNOSIS — F41.1 ANXIETY STATE: ICD-10-CM

## 2024-03-05 DIAGNOSIS — E66.9 OBESITY (BMI 30.0-34.9): ICD-10-CM

## 2024-03-05 DIAGNOSIS — E11.65 TYPE 2 DIABETES MELLITUS WITH HYPERGLYCEMIA, WITHOUT LONG-TERM CURRENT USE OF INSULIN (HCC): ICD-10-CM

## 2024-03-05 DIAGNOSIS — M19.042 PRIMARY OSTEOARTHRITIS OF BOTH HANDS: ICD-10-CM

## 2024-03-05 DIAGNOSIS — N18.31 STAGE 3A CHRONIC KIDNEY DISEASE (HCC): ICD-10-CM

## 2024-03-05 DIAGNOSIS — K21.9 GASTROESOPHAGEAL REFLUX DISEASE WITHOUT ESOPHAGITIS: ICD-10-CM

## 2024-03-05 DIAGNOSIS — M17.0 PRIMARY OSTEOARTHRITIS OF BOTH KNEES: ICD-10-CM

## 2024-03-05 DIAGNOSIS — D50.9 IRON DEFICIENCY ANEMIA, UNSPECIFIED IRON DEFICIENCY ANEMIA TYPE: ICD-10-CM

## 2024-03-05 DIAGNOSIS — E78.00 PURE HYPERCHOLESTEROLEMIA: ICD-10-CM

## 2024-03-05 DIAGNOSIS — G43.019 INTRACTABLE MIGRAINE WITHOUT AURA AND WITHOUT STATUS MIGRAINOSUS: ICD-10-CM

## 2024-03-05 DIAGNOSIS — I10 ESSENTIAL HYPERTENSION, BENIGN: ICD-10-CM

## 2024-03-05 DIAGNOSIS — M47.896 OTHER OSTEOARTHRITIS OF SPINE, LUMBAR REGION: ICD-10-CM

## 2024-03-05 DIAGNOSIS — M19.041 PRIMARY OSTEOARTHRITIS OF BOTH HANDS: ICD-10-CM

## 2024-03-05 DIAGNOSIS — J41.0 SMOKERS' COUGH (HCC): ICD-10-CM

## 2024-03-05 DIAGNOSIS — H90.3 SENSORINEURAL HEARING LOSS (SNHL) OF BOTH EARS: ICD-10-CM

## 2024-03-05 DIAGNOSIS — F33.0 MILD RECURRENT MAJOR DEPRESSION (HCC): ICD-10-CM

## 2024-03-05 DIAGNOSIS — Z00.00 ENCOUNTER FOR ANNUAL HEALTH EXAMINATION: Primary | ICD-10-CM

## 2024-03-05 LAB
BASOPHILS # BLD AUTO: 0.1 X10(3) UL (ref 0–0.2)
BASOPHILS NFR BLD AUTO: 1.4 %
CREAT UR-SCNC: 172 MG/DL
EOSINOPHIL # BLD AUTO: 0.53 X10(3) UL (ref 0–0.7)
EOSINOPHIL NFR BLD AUTO: 7.3 %
ERYTHROCYTE [DISTWIDTH] IN BLOOD BY AUTOMATED COUNT: 15 %
EST. AVERAGE GLUCOSE BLD GHB EST-MCNC: 154 MG/DL (ref 68–126)
HBA1C MFR BLD: 7 % (ref ?–5.7)
HCT VFR BLD AUTO: 38.9 %
HGB BLD-MCNC: 13 G/DL
IMM GRANULOCYTES # BLD AUTO: 0.02 X10(3) UL (ref 0–1)
IMM GRANULOCYTES NFR BLD: 0.3 %
LYMPHOCYTES # BLD AUTO: 1.95 X10(3) UL (ref 1–4)
LYMPHOCYTES NFR BLD AUTO: 27 %
MCH RBC QN AUTO: 30 PG (ref 26–34)
MCHC RBC AUTO-ENTMCNC: 33.4 G/DL (ref 31–37)
MCV RBC AUTO: 89.6 FL
MICROALBUMIN UR-MCNC: 9.22 MG/DL
MICROALBUMIN/CREAT 24H UR-RTO: 53.6 UG/MG (ref ?–30)
MONOCYTES # BLD AUTO: 0.46 X10(3) UL (ref 0.1–1)
MONOCYTES NFR BLD AUTO: 6.4 %
NEUTROPHILS # BLD AUTO: 4.16 X10 (3) UL (ref 1.5–7.7)
NEUTROPHILS # BLD AUTO: 4.16 X10(3) UL (ref 1.5–7.7)
NEUTROPHILS NFR BLD AUTO: 57.6 %
PLATELET # BLD AUTO: 212 10(3)UL (ref 150–450)
RBC # BLD AUTO: 4.34 X10(6)UL
VIT D+METAB SERPL-MCNC: 58 NG/ML (ref 30–100)
WBC # BLD AUTO: 7.2 X10(3) UL (ref 4–11)

## 2024-03-05 PROCEDURE — 83036 HEMOGLOBIN GLYCOSYLATED A1C: CPT

## 2024-03-05 PROCEDURE — 82306 VITAMIN D 25 HYDROXY: CPT

## 2024-03-05 PROCEDURE — 85025 COMPLETE CBC W/AUTO DIFF WBC: CPT

## 2024-03-05 PROCEDURE — 80053 COMPREHEN METABOLIC PANEL: CPT

## 2024-03-05 PROCEDURE — 84443 ASSAY THYROID STIM HORMONE: CPT

## 2024-03-05 PROCEDURE — 80061 LIPID PANEL: CPT

## 2024-03-05 PROCEDURE — 82570 ASSAY OF URINE CREATININE: CPT

## 2024-03-05 PROCEDURE — 82043 UR ALBUMIN QUANTITATIVE: CPT

## 2024-03-05 PROCEDURE — 36415 COLL VENOUS BLD VENIPUNCTURE: CPT

## 2024-03-05 RX ORDER — ALPRAZOLAM 0.5 MG/1
0.5 TABLET ORAL 3 TIMES DAILY PRN
Qty: 40 TABLET | Refills: 0 | Status: SHIPPED | OUTPATIENT
Start: 2024-03-05

## 2024-03-05 NOTE — PATIENT INSTRUCTIONS
Lizbeth Berkowitz's SCREENING SCHEDULE   Tests on this list are recommended by your physician but may not be covered, or covered at this frequency, by your insurer.   Please check with your insurance carrier before scheduling to verify coverage.   PREVENTATIVE SERVICES FREQUENCY &  COVERAGE DETAILS LAST COMPLETION DATE   Diabetes Screening    Fasting Blood Sugar /  Glucose    One screening every 12 months if never tested or if previously tested but not diagnosed with pre-diabetes   One screening every 6 months if diagnosed with pre-diabetes Lab Results   Component Value Date     (H) 11/10/2023        Cardiovascular Disease Screening    Lipid Panel  Cholesterol  Lipoprotein (HDL)  Triglycerides Covered every 5 years for all Medicare beneficiaries without apparent signs or symptoms of cardiovascular disease Lab Results   Component Value Date    CHOLEST 119 10/26/2023    HDL 42 10/26/2023    LDL 40 10/26/2023    LDLD 56 08/09/2018    LDLD 56 08/09/2018    TRIG 240 (H) 10/26/2023         Electrocardiogram (EKG)   Covered if needed at Welcome to Medicare, and non-screening if indicated for medical reasons 09/07/2021      Ultrasound Screening for Abdominal Aortic Aneurysm (AAA) Covered once in a lifetime for one of the following risk factors   • Men who are 65-75 years old and have ever smoked   • Anyone with a family history -     Colorectal Cancer Screening  Covered for ages 50-85; only need ONE of the following:    Colonoscopy   Covered every 10 years    Covered every 2 years if patient is at high risk or previous colonoscopy was abnormal -    Health Maintenance   Topic Date Due   • Colorectal Cancer Screening  10/26/2024       Flexible Sigmoidoscopy   Covered every 4 years -    Fecal Occult Blood Test Covered annually 10/26/2023   Bone Density Screening    Bone density screening    Covered every 2 years after age 65 if diagnosed with risk of osteoporosis or estrogen deficiency.    Covered yearly for long-term  glucocorticoid medication use (Steroids) No results found for this or any previous visit.      No recommendations at this time   Pap and Pelvic    Pap   Covered every 2 years for women at normal risk; Annually if at high risk 03/07/2023  Health Maintenance   Topic Date Due   • Pap Smear  03/07/2026       Chlamydia Annually if high risk 10/26/2023  No recommendations at this time   Screening Mammogram    Mammogram     Recommend annually for all female patients aged 40 and older    One baseline mammogram covered for patients aged 35-39 10/26/2023    Health Maintenance   Topic Date Due   • Mammogram  10/26/2024       Immunizations    Influenza Covered once per flu season  Please get every year 09/27/2023  No recommendations at this time    Pneumococcal Each vaccine ([id number] & [id number]) covered once after 65 Prevnar 13: -    Vjycjyfvw01: 07/28/2020     No recommendations at this time    Hepatitis B One screening covered for patients with certain risk factors   -  No recommendations at this time    Tetanus Toxoid Not covered by Medicare Part B unless medically necessary (cut with metal); may be covered with your pharmacy prescription benefits 04/01/2000    Tetanus, Diptheria and Pertusis TD and TDaP Not covered by Medicare Part B -  No recommendations at this time    Zoster Not covered by Medicare Part B; may be covered with your pharmacy  prescription benefits -  Zoster Vaccines(1 of 2) Never done     Diabetes      Hemoglobin A1C Annually; if last result is elevated, may be asked to retest more frequently.    Medicare covers every 3 months Lab Results   Component Value Date     (H) 11/10/2023    A1C 7.2 (H) 11/10/2023       No recommendations at this time    Creat/alb ratio Annually Lab Results   Component Value Date    MICROALBCREA 55.4 (H) 10/26/2023    MALBCRECALC 5.2 08/09/2018       LDL Annually Lab Results   Component Value Date    LDL 40 10/26/2023       Dilated Eye Exam Annually [unfilled]      Annual Monitoring of Persistent Medications (ACE/ARB, digoxin diuretics, anticonvulsants)    Potassium Annually Lab Results   Component Value Date    K 3.8 11/10/2023         Creatinine   Annually Lab Results   Component Value Date    CREATSERUM 1.61 (H) 11/10/2023         BUN Annually Lab Results   Component Value Date    BUN 21 11/10/2023       Drug Serum Conc Annually No results found for: \"DIGOXIN\", \"DIG\", \"VALP\"         Chronic Obstructive Pulmonary Disease (COPD)    Spirometry Annually Spirometry date:

## 2024-03-05 NOTE — PROGRESS NOTES
Subjective:   Lizbeth Berkowitz is a 58 year old female who presents for a MA (Medicare Advantage) Supervisit (Once per calendar year) and scheduled follow up of multiple significant but stable problems.     Pt is here for a recheck of anxiety/depression. Has been tolerating the meds well. Denies any side effects from the meds. Mood has been good. Patient's appetite is good.Pt denies headaches,tics,or insomnia.No depressive symptoms or suicidal ideations. Would like to continue the same medication. Does not want to see a counselor now.    Lizbeth Berkowitz is a 58 year old female who presents for recheck of her diabetes.   Patient’s does not check her blood sugars.   Last visit with ophthalmologist was 11/28/22.  Pt aware she is due.  Pt has been checking her feet on a regular basis. Pt denies any tingling of the feet.      Patient presents for recheck of their hypertension.hyperlipidemia. Pt has been taking medications as instructed, no medication side effects, home BP monitoring has not been done.  Currently asymptomatic, no chest pains, jaw pains, arm pains. No headaches, dizziness or edema.  No SOB on exertion or rest.  Pt has been trying to follow a low fat diet and has not been exercising but does try to walk more but he knees are bothering her a lot. Pt has tried injections in the knees but the injection only lasted about a month. Pt has talked to her Rheumatologist about her arthritic pain and he may be changing her pain medication. Pt will see him again in May.    History/Other:   Fall Risk Assessment:   She has been screened for Falls and is High Risk. Fall Prevention information provided to patient in After Visit Summary.    Do you feel unsteady when standing or walking?: Yes  Do you worry about falling?: Yes  Have you fallen in the past year?: Yes  How many times have you fallen?: 2  Were you injured?: Yes     Cognitive Assessment:   She had a completely normal cognitive assessment - see flowsheet entries      Functional Ability/Status:   Lizbeth Berkowitz has some abnormal functions as listed below:  She has Dressing and/or Bathing issues based on screening of functional status.  Difficulty dressing or bathing?: Yes  Bathing or Showering: Able without help  Dressing: Need some help  She has Meal Preparation difficulties based on screening of functional status.She has difficulties Shopping for Groceries based on screening of functional status. She has Hearing problems based on screening of functional status.She has Vision problems based on screening of functional status. She has Walking problems based on screening of functional status. She has problems with Daily Activities based on screening of functional status.       Depression Screening (PHQ-2/PHQ-9): PHQ-2 SCORE: 0  , done 3/5/2024        <5 minutes spent screening and counseling for depression    Advanced Directives:   She does NOT have a Living Will. [Do you have a living will?: No]  She does NOT have a Power of  for Health Care. [Do you have a healthcare power of ?: No]  Discussed Advance Care Planning with patient (and family/surrogate if present). Standard forms made available to patient in After Visit Summary.      Patient Active Problem List   Diagnosis    Pure hypercholesterolemia    Anemia, unspecified    Anxiety state    Esophageal reflux    Diabetes mellitus (HCC)    Essential hypertension, benign    Migraine without aura    Primary osteoarthritis of both knees    Cramps, extremity    Osteoarthritis of lumbar spine    History of kidney stones    Current smoker    Sensorineural hearing loss (SNHL) of both ears    Mild recurrent major depression (HCC)    CKD (chronic kidney disease) stage 3, GFR 30-59 ml/min ([facility])    Smokers' cough ([facility])    Primary osteoarthritis of left hip    History of total left hip replacement    Primary osteoarthritis of right hip    Type 2 diabetes mellitus with hyperglycemia, without long-term current use of insulin  (HCC)    Stenosis of cervical spine    Primary osteoarthritis of both hands    Other specified anemias    Obesity (BMI 30.0-34.9)    Vitamin D deficiency     Allergies:  She is allergic to etodolac and diclofenac.    Current Medications:  Outpatient Medications Marked as Taking for the 3/5/24 encounter (Office Visit) with Meera Navarrete APRN   Medication Sig    ALPRAZolam 0.5 MG Oral Tab Take 1 tablet (0.5 mg total) by mouth 3 (three) times daily as needed.    HYDROcodone-acetaminophen (NORCO)  MG Oral Tab Take 1 tablet by mouth every 4 (four) hours as needed for Pain.    folic acid 1 MG Oral Tab Take 1 tablet (1 mg total) by mouth daily.    ondansetron 4 MG Oral Tablet Dispersible DISSOLVE 1 TABLET IN MOUTH EVERY 8 HOURS AS NEEDED FOR NAUSEA    glimepiride 4 MG Oral Tab Take 1 tablet (4 mg total) by mouth before breakfast.    rosuvastatin 10 MG Oral Tab TAKE 1 TABLET BY MOUTH NIGHTLY    hydroCHLOROthiazide 25 MG Oral Tab TAKE 1 TABLET BY MOUTH EVERY DAY    losartan 100 MG Oral Tab TAKE 1 TABLET BY MOUTH EVERY DAY    metFORMIN HCl 1000 MG Oral Tab TAKE 1 TABLET BY MOUTH 2 TIMES A DAY WITH MEALS    PARoxetine HCl 40 MG Oral Tab Take 1 tablet (40 mg total) by mouth every morning.    Cholecalciferol (VITAMIN D) 50 MCG (2000 UT) Oral Cap Take 1 capsule (2,000 Units total) by mouth daily.    Esomeprazole Magnesium 20 MG Oral Powd Pack Take 20 mg by mouth daily.       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  Surgical History:  She  has a past surgical history that includes other surgical history; d & c; femur/knee surg  unlisted; tubal ligation;  (); cholecystectomy; other; upper gi endoscopy,exam; and hip replacement surgery (Left).   Family History:  Her family history includes Arthritis in her father; Blood Disorder in her mother; Heart Disorder in her maternal grandfather and maternal grandmother; Pulmonary Disease in her father.  Social History:  She  reports that she has been smoking cigarettes. She has been smoking an average of 0.5 packs per day. She has never used smokeless tobacco. She reports that she does not drink alcohol and does not use drugs.    Tobacco:  She currently smokes tobacco.  Social History    Tobacco Use      Smoking status: Every Day        Packs/day: .5        Types: Cigarettes      Smokeless tobacco: Never      Tobacco comments: Smokes 4 cigarettes daily     Tobacco Cessation Documentation (Smoking and Smokeless included):  I had an in depth therapy session with Lizbeth Berkowitz about her tobacco use risks and options using the USPSTF's Five A's approach:    Ask: Lizbeth is using tobacco products.  Assess: We asked about/assessed behavioral health risk and factors affecting choice of behavior change goals/methods.  Specifically I asked about readiness to quit tobacco.  Pt does not want to quit. Pt states smoking helps her anxiety levels. Pt has cut down a bit.  Time Counseled: <1 minutes       CAGE Alcohol Screen:   CAGE screening score of 0 on 3/5/2024, showing low risk of alcohol abuse.      Patient Care Team:  Elaine Gandhi MD as PCP - General (Family Practice)  Parker Nunn MD (OBSTETRICS & GYNECOLOGY)  Wayne Negrete MD (RHEUMATOLOGY)    Review of Systems  GENERAL: feels well otherwise  SKIN: denies any unusual skin lesions  EYES: denies blurred vision or double vision  HEENT: denies nasal congestion, sinus pain or ST  LUNGS: denies shortness of breath with exertion,smoker  CARDIOVASCULAR: denies chest pain on exertion,+HTN  GI: denies abdominal pain, +GERD  : denies dysuria,  vaginal discharge or itching, no complaint of urinary incontinence   MUSCULOSKELETAL:+OA back, hips, knees and hands  NEURO: + migraine headaches  PSYCHE: +depression and anxiety  HEMATOLOGIC: +anemia  ENDOCRINE: denies thyroid history,+DM  ALL/ASTHMA: denies hx of allergy or asthma    Objective:   Physical Exam  General Appearance:  Alert, cooperative, no distress, appears stated age   Head:  Normocephalic, without obvious abnormality, atraumatic   Eyes:  PERRL, conjunctiva/corneas clear, EOM's intact both eyes   Ears:  Normal TM's and external ear canals, both ears   Nose: Nares normal, septum midline,mucosa normal, no drainage or sinus tenderness   Throat: Lips, mucosa, and tongue normal; teeth and gums normal   Neck: Supple, symmetrical, trachea midline, no adenopathy;  thyroid: not enlarged, symmetric, no tenderness/mass/nodules; no carotid bruit or JVD   Back:   Symmetric, no curvature, ROM normal, no CVA tenderness   Lungs:   Clear to auscultation bilaterally, respirations unlabored   Heart:  Regular rate and rhythm, S1 and S2 normal, no murmur, rub, or gallop   Abdomen:   Soft, non-tender, bowel sounds active all four quadrants,  no masses, no organomegaly   Pelvic: Deferred   Extremities: Extremities normal, atraumatic, no cyanosis or edema   Pulses: 2+ and symmetric   Skin: Skin color, texture, turgor normal, no rashes or lesions   Lymph nodes: Cervical, supraclavicular, and axillary nodes normal   Neurologic: Normal       /82 (BP Location: Left arm, Patient Position: Sitting, Cuff Size: adult)   Pulse (!) 128   Temp 97.1 °F (36.2 °C) (Temporal)   Resp 16   Ht 5' 4\" (1.626 m)   Wt 198 lb 9.6 oz (90.1 kg)   LMP 11/06/2015   SpO2 97%   BMI 34.09 kg/m²  Estimated body mass index is 34.09 kg/m² as calculated from the following:    Height as of this encounter: 5' 4\" (1.626 m).    Weight as of this encounter: 198 lb 9.6 oz (90.1 kg).    Medicare Hearing Assessment:   Hearing Screening    Time  taken: 3/5/2024  9:28 AM  Entry User: Hilda Soriano CMA  Screening Method: Finger Rub  Finger Rub Result: Pass               Visual Acuity:   Right Eye Visual Acuity: Corrected Right Eye Chart Acuity: 20/70   Left Eye Visual Acuity: Corrected Left Eye Chart Acuity: 20/70   Both Eyes Visual Acuity: Corrected Both Eyes Chart Acuity: 20/50   Able To Tolerate Visual Acuity: Yes        Assessment & Plan:   Lizbeth Berkowitz is a 58 year old female who presents for a Medicare Assessment.     1. Encounter for annual health examination (Primary)  2. Smokers' cough (HCC)  3. Mild recurrent major depression (HCC)  4. Type 2 diabetes mellitus with hyperglycemia, without long-term current use of insulin (HCC)  -     Comp Metabolic Panel (14); Future; Expected date: 03/05/2024  -     Microalb/Creat Ratio, Random Urine; Future; Expected date: 03/05/2024  -     Hemoglobin A1C; Future; Expected date: 03/05/2024  5. Stage 3a chronic kidney disease (HCC)  6. Obesity (BMI 30.0-34.9)  7. Vitamin D deficiency  -     Vitamin D; Future; Expected date: 03/05/2024  8. Anxiety state  -     ALPRAZolam; Take 1 tablet (0.5 mg total) by mouth 3 (three) times daily as needed.  Dispense: 40 tablet; Refill: 0  -     TSH W Reflex To Free T4; Future; Expected date: 03/05/2024  9. Iron deficiency anemia, unspecified iron deficiency anemia type  -     CBC With Differential With Platelet; Future; Expected date: 03/05/2024  10. Essential hypertension, benign  -     Comp Metabolic Panel (14); Future; Expected date: 03/05/2024  -     Lipid Panel; Future; Expected date: 03/05/2024  11. Pure hypercholesterolemia  -     Comp Metabolic Panel (14); Future; Expected date: 03/05/2024  -     Lipid Panel; Future; Expected date: 03/05/2024  12. Current smoker  13. Intractable migraine without aura and without status migrainosus  14. Primary osteoarthritis of both knees  15. Primary osteoarthritis of hips, bilateral  16. Stenosis of cervical spine  17.  Gastroesophageal reflux disease without esophagitis  18. Sensorineural hearing loss (SNHL) of both ears  19. Other osteoarthritis of spine, lumbar region  20. Primary osteoarthritis of both hands  21. Postmenopausal estrogen deficiency  -     XR DEXA BONE DENSITOMETRY (CPT=77080); Future; Expected date: 03/05/2024      PLAN:  1. Smokers' cough (HCC)  - Pt is trying to stop smoking. Pt vapes now instead of smoking cigarettes.     2. Mild recurrent major depression (HCC)  - stable, continue mediccation    3. Type 2 diabetes mellitus with hyperglycemia, without long-term current use of insulin (HCC)  Continue present medications. Check sugars daily or as directed.  Eat a low fat, low carb, low cholesterol diet. Call with sugars less than 70 or greater than 300.   Fasting labs due  Please see your specialists as recommended.  Keep up with regular eye exams and Check feet daily. Stay on your low salt, diabetic diet.  Stay as active as you can.  See me back as scheduled, every 6 months.     - Comp Metabolic Panel (14); Future  - Microalb/Creat Ratio, Random Urine; Future  - Hemoglobin A1C; Future    4. Stage 3a chronic kidney disease (HCC)  Pt to keep HTN and DM under control to help protect her kidneys. Recheck CMP     5. Obesity (BMI 30.0-34.9)  Pt to work on eating less, plenty of vegetables. Pt to try and stay around 1200 calories per day     6. Vitamin D deficiency  Check level  - Vitamin D; Future    7. Anxiety state  - stable, continue medication  - ALPRAZolam 0.5 MG Oral Tab; Take 1 tablet (0.5 mg total) by mouth 3 (three) times daily as needed.  Dispense: 40 tablet; Refill: 0  - TSH W Reflex To Free T4; Future    8. Iron deficiency anemia, unspecified iron deficiency anemia type    - CBC With Differential With Platelet; Future    9. Essential hypertension, benign  Conservative measures dicussed. Continue medication.  Diet and exercise explained and encouraged.  Fasting labs due.  Home blood pressure monitoring.  Pt should measure BP’s two to three times per week. Goal blood pressure at home - < 135/85.     - Comp Metabolic Panel (14); Future  - Lipid Panel; Future    10. Pure hypercholesterolemia  Pt to continue their medication, increase exercise,  choose better fats,  increase fiber and avoid processed foods.    - Comp Metabolic Panel (14); Future  - Lipid Panel; Future    11. Current smoker  - Discussed stop smoking with Pt. Pt is trying to cut down using her vape pen. Pt is not ready to totally stop due to her anxiety     12. Intractable migraine without aura and without status migrainosus  stable, continue medication as needed     13. Primary osteoarthritis of both knees  -managed by Dr. Barrios and Dr. Negrete     14. Primary osteoarthritis of hips, bilateral  -managed by Dr. Barrios and Dr. Negrete     15. Stenosis of cervical spine  Stable, continue medication as needed    16. Gastroesophageal reflux disease without esophagitis  Otc nexium as needed    17. Sensorineural hearing loss (SNHL) of both ears  Managed by Dr. Bedoya    18. Other osteoarthritis of spine, lumbar region  -managed by Dr. Barrios and Dr. Negrete     19. Primary osteoarthritis of both hands  -managed by Dr. Barrios and Dr. Negrete     20. Postmenopausal estrogen deficiency    - XR DEXA BONE DENSITOMETRY (CPT=77080); Future    21. Encounter for annual health examination  - overall stable. Pt to f/u in one year for her annual exam       The patient indicates understanding of these issues and agrees to the plan.  Continue with current treatment plan.  Follow up in  6  month(s).  Imaging studies ordered.  Lab work ordered.  Prescription medication ordered.  Reinforced healthy diet, lifestyle, and exercise.      Return in 6 months (on 9/5/2024).     HEAVENLY Sy, 3/5/2024     Supplementary Documentation:   General Health:  In the past six months, have you lost more than 10 pounds without trying?: 2 - No  Has your appetite been poor?: Yes  Type  of Diet: Diabetic;Low Salt  How does the patient maintain a good energy level?: Stretching  How would you describe your daily physical activity?: Light  How would you describe your current health state?: Poor  How do you maintain positive mental well-being?: Social Interaction;Games;Visiting Friends;Visiting Family  On a scale of 0 to 10, with 0 being no pain and 10 being severe pain, what is your pain level?: 9 - (Severe)  In the past six months, have you experienced urine leakage?: 0-No  At any time do you feel concerned for the safety/well-being of yourself and/or your children, in your home or elsewhere?: No  Have you had any immunizations at another office such as Influenza, Hepatitis B, Tetanus, or Pneumococcal?: Yes       Lizbeth Berkowitz's SCREENING SCHEDULE   Tests on this list are recommended by your physician but may not be covered, or covered at this frequency, by your insurer.   Please check with your insurance carrier before scheduling to verify coverage.   PREVENTATIVE SERVICES FREQUENCY &  COVERAGE DETAILS LAST COMPLETION DATE   Diabetes Screening    Fasting Blood Sugar /  Glucose    One screening every 12 months if never tested or if previously tested but not diagnosed with pre-diabetes   One screening every 6 months if diagnosed with pre-diabetes Lab Results   Component Value Date     (H) 11/10/2023        Cardiovascular Disease Screening    Lipid Panel  Cholesterol  Lipoprotein (HDL)  Triglycerides Covered every 5 years for all Medicare beneficiaries without apparent signs or symptoms of cardiovascular disease Lab Results   Component Value Date    CHOLEST 119 10/26/2023    HDL 42 10/26/2023    LDL 40 10/26/2023    LDLD 56 08/09/2018    LDLD 56 08/09/2018    TRIG 240 (H) 10/26/2023         Electrocardiogram (EKG)   Covered if needed at Welcome to Medicare, and non-screening if indicated for medical reasons 09/07/2021      Ultrasound Screening for Abdominal Aortic Aneurysm (AAA) Covered once  in a lifetime for one of the following risk factors    Men who are 65-75 years old and have ever smoked    Anyone with a family history -     Colorectal Cancer Screening  Covered for ages 50-85; only need ONE of the following:    Colonoscopy   Covered every 10 years    Covered every 2 years if patient is at high risk or previous colonoscopy was abnormal -    Health Maintenance   Topic Date Due    Colorectal Cancer Screening  10/26/2024       Flexible Sigmoidoscopy   Covered every 4 years -    Fecal Occult Blood Test Covered annually 10/26/2023   Bone Density Screening    Bone density screening    Covered every 2 years after age 65 if diagnosed with risk of osteoporosis or estrogen deficiency.    Covered yearly for long-term glucocorticoid medication use (Steroids) No results found for this or any previous visit.      No recommendations at this time   Pap and Pelvic    Pap   Covered every 2 years for women at normal risk; Annually if at high risk 03/07/2023  Health Maintenance   Topic Date Due    Pap Smear  03/07/2026       Chlamydia Annually if high risk 10/26/2023  No recommendations at this time   Screening Mammogram    Mammogram     Recommend annually for all female patients aged 40 and older    One baseline mammogram covered for patients aged 35-39 10/26/2023    Health Maintenance   Topic Date Due    Mammogram  10/26/2024       Immunizations    Influenza Covered once per flu season  Please get every year 09/27/2023  No recommendations at this time    Pneumococcal Each vaccine ([id number] & [id number]) covered once after 65 Prevnar 13: -    Tcsosbzfv09: 07/28/2020     No recommendations at this time    Hepatitis B One screening covered for patients with certain risk factors   -  No recommendations at this time    Tetanus Toxoid Not covered by Medicare Part B unless medically necessary (cut with metal); may be covered with your pharmacy prescription benefits 04/01/2000    Tetanus, Diptheria and Pertusis TD and  TDaP Not covered by Medicare Part B -  No recommendations at this time    Zoster Not covered by Medicare Part B; may be covered with your pharmacy  prescription benefits -  Zoster Vaccines(1 of 2) Never done     Diabetes      Hemoglobin A1C Annually; if last result is elevated, may be asked to retest more frequently.    Medicare covers every 3 months Lab Results   Component Value Date     (H) 11/10/2023    A1C 7.2 (H) 11/10/2023       No recommendations at this time    Creat/alb ratio Annually Lab Results   Component Value Date    MICROALBCREA 55.4 (H) 10/26/2023    MALBCRECALC 5.2 08/09/2018       LDL Annually Lab Results   Component Value Date    LDL 40 10/26/2023       Dilated Eye Exam Annually Last Diabetic Eye Exam:  No data recorded  No data recorded       Annual Monitoring of Persistent Medications (ACE/ARB, digoxin diuretics, anticonvulsants)    Potassium Annually Lab Results   Component Value Date    K 3.8 11/10/2023         Creatinine   Annually Lab Results   Component Value Date    CREATSERUM 1.61 (H) 11/10/2023         BUN Annually Lab Results   Component Value Date    BUN 21 11/10/2023       Drug Serum Conc Annually No results found for: \"DIGOXIN\", \"DIG\", \"VALP\"           Chronic Obstructive Pulmonary Disease (COPD)    Spirometry Annually Spirometry date:

## 2024-03-06 LAB
ALBUMIN SERPL-MCNC: 4.1 G/DL (ref 3.4–5)
ALBUMIN/GLOB SERPL: 1.1 {RATIO} (ref 1–2)
ALP LIVER SERPL-CCNC: 51 U/L
ALT SERPL-CCNC: 19 U/L
ANION GAP SERPL CALC-SCNC: 7 MMOL/L (ref 0–18)
AST SERPL-CCNC: 16 U/L (ref 15–37)
BILIRUB SERPL-MCNC: 0.5 MG/DL (ref 0.1–2)
BUN BLD-MCNC: 25 MG/DL (ref 9–23)
CALCIUM BLD-MCNC: 9.9 MG/DL (ref 8.5–10.1)
CHLORIDE SERPL-SCNC: 106 MMOL/L (ref 98–112)
CHOLEST SERPL-MCNC: 119 MG/DL (ref ?–200)
CO2 SERPL-SCNC: 27 MMOL/L (ref 21–32)
CREAT BLD-MCNC: 1.4 MG/DL
EGFRCR SERPLBLD CKD-EPI 2021: 43 ML/MIN/1.73M2 (ref 60–?)
FASTING PATIENT LIPID ANSWER: YES
FASTING STATUS PATIENT QL REPORTED: YES
GLOBULIN PLAS-MCNC: 3.9 G/DL (ref 2.8–4.4)
GLUCOSE BLD-MCNC: 132 MG/DL (ref 70–99)
HDLC SERPL-MCNC: 37 MG/DL (ref 40–59)
LDLC SERPL CALC-MCNC: 46 MG/DL (ref ?–100)
NONHDLC SERPL-MCNC: 82 MG/DL (ref ?–130)
OSMOLALITY SERPL CALC.SUM OF ELEC: 296 MOSM/KG (ref 275–295)
POTASSIUM SERPL-SCNC: 3.6 MMOL/L (ref 3.5–5.1)
PROT SERPL-MCNC: 8 G/DL (ref 6.4–8.2)
SODIUM SERPL-SCNC: 140 MMOL/L (ref 136–145)
TRIGL SERPL-MCNC: 224 MG/DL (ref 30–149)
TSI SER-ACNC: 1.16 MIU/ML (ref 0.36–3.74)
VLDLC SERPL CALC-MCNC: 32 MG/DL (ref 0–30)

## 2024-03-08 DIAGNOSIS — E78.00 PURE HYPERCHOLESTEROLEMIA: ICD-10-CM

## 2024-03-08 DIAGNOSIS — E11.65 TYPE 2 DIABETES MELLITUS WITH HYPERGLYCEMIA, WITHOUT LONG-TERM CURRENT USE OF INSULIN (HCC): Primary | ICD-10-CM

## 2024-03-08 DIAGNOSIS — I10 ESSENTIAL HYPERTENSION, BENIGN: ICD-10-CM

## 2024-03-31 DIAGNOSIS — F33.0 MILD RECURRENT MAJOR DEPRESSION (HCC): ICD-10-CM

## 2024-03-31 DIAGNOSIS — F41.1 ANXIETY STATE: ICD-10-CM

## 2024-04-01 DIAGNOSIS — F41.1 ANXIETY STATE: ICD-10-CM

## 2024-04-01 RX ORDER — PAROXETINE HYDROCHLORIDE 40 MG/1
40 TABLET, FILM COATED ORAL EVERY MORNING
Qty: 30 TABLET | Refills: 5 | Status: SHIPPED | OUTPATIENT
Start: 2024-04-01

## 2024-04-01 RX ORDER — ALPRAZOLAM 0.5 MG/1
0.5 TABLET ORAL 3 TIMES DAILY PRN
Qty: 40 TABLET | Refills: 0 | Status: SHIPPED | OUTPATIENT
Start: 2024-04-01

## 2024-04-01 NOTE — TELEPHONE ENCOUNTER
Paroxetine 40 mg  Filled 10-2-23  Qty 30  5 refills  Future Appointments   Date Time Provider Department Center   4/16/2024  1:00 PM [facility] DEXA RM1 [facility] DEXA EM [facility]   5/14/2024  1:00 PM Wayne Negrete MD EM[facility]BSN EMG [facility] 3-5-24 SM

## 2024-04-25 DIAGNOSIS — R11.0 NAUSEA: Primary | ICD-10-CM

## 2024-04-25 DIAGNOSIS — F41.1 ANXIETY STATE: ICD-10-CM

## 2024-04-25 DIAGNOSIS — F33.0 MILD RECURRENT MAJOR DEPRESSION (HCC): ICD-10-CM

## 2024-04-25 DIAGNOSIS — E11.9 TYPE 2 DIABETES MELLITUS WITHOUT COMPLICATION, WITHOUT LONG-TERM CURRENT USE OF INSULIN (HCC): ICD-10-CM

## 2024-04-26 RX ORDER — ONDANSETRON 4 MG/1
4 TABLET, ORALLY DISINTEGRATING ORAL EVERY 8 HOURS PRN
Qty: 60 TABLET | Refills: 1 | Status: SHIPPED | OUTPATIENT
Start: 2024-04-26

## 2024-04-26 RX ORDER — PAROXETINE HYDROCHLORIDE 40 MG/1
40 TABLET, FILM COATED ORAL EVERY MORNING
Qty: 30 TABLET | Refills: 5 | OUTPATIENT
Start: 2024-04-26

## 2024-04-26 RX ORDER — ALPRAZOLAM 0.5 MG/1
0.5 TABLET ORAL 3 TIMES DAILY PRN
Qty: 40 TABLET | Refills: 0 | Status: SHIPPED | OUTPATIENT
Start: 2024-04-26

## 2024-04-26 NOTE — TELEPHONE ENCOUNTER
Metformin 1000 mg    Diabetes Medication Protocol [id number]/25/2024 05:22 PM   Protocol Details EGFRCR or GFRNAA > 50      Filled 11-6-23  Qty 180  1 refill  Future Appointments   Date Time Provider Department Center   5/14/2024  1:00 PM Wayne Negrete MD [facility]   5/24/2024  1:20 PM PF DEXA RM1 PF DEXA [place]   LOV 3-5-24 SM  Labs 3-5-24 A1c/microalb./Creat.     Alprazolam 0.5 mg  Filled 4-1-24  Qty 40  0 refills  Future Appointments   Date Time Provider Department Center   5/14/2024  1:00 PM Wayne Negrete MD [facility]   5/24/2024  1:20 PM PF DEXA RM1 PF DEXA [place]   LOV 3-5-24 SM    Paroxetine 40 mg- too early for refill  Filled 4-1-24  Qty 30  5 refills

## 2024-04-26 NOTE — TELEPHONE ENCOUNTER
Future Appointments   Date Time Provider Department Center   5/14/2024  1:00 PM Wayne Negrete MD EMGRHE[facility]BSN EMG [place]   5/24/2024  1:20 PM PF JOHN [facility] PF DEXSRINIVAS [place]     Last office visit: 2/13/2024    Last fill: 11/29/2023 60 tab, 1 refill

## 2024-05-07 NOTE — TELEPHONE ENCOUNTER
Alprazolam 0.5 mg  Filled 9-14-23  Qty 40  0 refills  Future Appointments   Date Time Provider Larisa Imani   10/26/2023 10:15 AM REF [facility]K REF EMG8 Ref BBK 8   10/26/2023 10:40 AM BBK FIORELLA RM1 BBK JOSE MANUEL [place]   [id number] 58:10 PM Lilly Pepper MD EMGRHEUMHBSN EMG [facility] [place] 9-27-23  For information on Fall & Injury Prevention, visit: [email or web address] OR  [email or web address] OR  https://www.cdc.gov/steadi/patient.html

## 2024-05-08 DIAGNOSIS — E11.9 TYPE 2 DIABETES MELLITUS WITHOUT COMPLICATION, WITHOUT LONG-TERM CURRENT USE OF INSULIN (HCC): ICD-10-CM

## 2024-05-08 DIAGNOSIS — I15.2 HYPERTENSION ASSOCIATED WITH TYPE 2 DIABETES MELLITUS (HCC): ICD-10-CM

## 2024-05-08 DIAGNOSIS — E11.59 HYPERTENSION ASSOCIATED WITH TYPE 2 DIABETES MELLITUS (HCC): ICD-10-CM

## 2024-05-08 RX ORDER — HYDROCHLOROTHIAZIDE 25 MG/1
25 TABLET ORAL DAILY
Qty: 90 TABLET | Refills: 0 | Status: SHIPPED | OUTPATIENT
Start: 2024-05-08

## 2024-05-08 RX ORDER — GLIMEPIRIDE 4 MG/1
4 TABLET ORAL
Qty: 90 TABLET | Refills: 0 | Status: SHIPPED | OUTPATIENT
Start: 2024-05-08

## 2024-05-08 RX ORDER — ROSUVASTATIN CALCIUM 10 MG/1
10 TABLET, COATED ORAL NIGHTLY
Qty: 90 TABLET | Refills: 0 | Status: SHIPPED | OUTPATIENT
Start: 2024-05-08

## 2024-05-08 RX ORDER — LOSARTAN POTASSIUM 100 MG/1
100 TABLET ORAL DAILY
Qty: 90 TABLET | Refills: 0 | Status: SHIPPED | OUTPATIENT
Start: 2024-05-08

## 2024-05-13 RX ORDER — HYDROCODONE BITARTRATE AND ACETAMINOPHEN 10; 325 MG/1; MG/1
1 TABLET ORAL EVERY 4 HOURS PRN
Qty: 180 TABLET | Refills: 0 | Status: CANCELLED
Start: 2024-05-13

## 2024-05-14 ENCOUNTER — OFFICE VISIT (OUTPATIENT)
Dept: RHEUMATOLOGY | Facility: CLINIC | Age: 59
End: 2024-05-14

## 2024-05-14 VITALS
DIASTOLIC BLOOD PRESSURE: 80 MMHG | SYSTOLIC BLOOD PRESSURE: 112 MMHG | RESPIRATION RATE: 16 BRPM | WEIGHT: 191 LBS | TEMPERATURE: 98 F | HEART RATE: 142 BPM | OXYGEN SATURATION: 98 % | HEIGHT: 64 IN | BODY MASS INDEX: 32.61 KG/M2

## 2024-05-14 DIAGNOSIS — M16.11 PRIMARY OSTEOARTHRITIS OF RIGHT HIP: ICD-10-CM

## 2024-05-14 DIAGNOSIS — M17.0 PRIMARY OSTEOARTHRITIS OF BOTH KNEES: Primary | ICD-10-CM

## 2024-05-14 DIAGNOSIS — M47.896 OTHER OSTEOARTHRITIS OF SPINE, LUMBAR REGION: ICD-10-CM

## 2024-05-14 DIAGNOSIS — Z96.642 HISTORY OF TOTAL LEFT HIP REPLACEMENT: ICD-10-CM

## 2024-05-14 PROCEDURE — 99214 OFFICE O/P EST MOD 30 MIN: CPT | Performed by: INTERNAL MEDICINE

## 2024-05-14 PROCEDURE — 3074F SYST BP LT 130 MM HG: CPT | Performed by: INTERNAL MEDICINE

## 2024-05-14 PROCEDURE — 3008F BODY MASS INDEX DOCD: CPT | Performed by: INTERNAL MEDICINE

## 2024-05-14 PROCEDURE — 3079F DIAST BP 80-89 MM HG: CPT | Performed by: INTERNAL MEDICINE

## 2024-05-14 RX ORDER — HYDROMORPHONE HYDROCHLORIDE 4 MG/1
4 TABLET ORAL EVERY 4 HOURS PRN
Qty: 180 TABLET | Refills: 0 | Status: SHIPPED | OUTPATIENT
Start: 2024-05-14

## 2024-05-14 RX ORDER — HYDROMORPHONE HYDROCHLORIDE 4 MG/1
4 TABLET ORAL EVERY 4 HOURS PRN
Qty: 180 TABLET | Refills: 0 | Status: SHIPPED | OUTPATIENT
Start: 2024-06-13

## 2024-05-14 RX ORDER — HYDROMORPHONE HYDROCHLORIDE 4 MG/1
4 TABLET ORAL EVERY 4 HOURS PRN
Qty: 180 TABLET | Refills: 0 | Status: SHIPPED | OUTPATIENT
Start: 2024-07-13

## 2024-05-14 NOTE — PROGRESS NOTES
[facility]  Dr. Negrete Progress Note     Subjective:   Lizbeth Berkowitz is a(n) 58 year old female.   Current complaints:   Chief Complaint   Patient presents with    Follow - Up     Patient here for 3 month follow up wants alternative for norco states medication doesn't work like it use to left knee pain knee is giving out patient states she has problems walking x 1 month patient states other symptoms are unchanged    Chronic osteoarthritis of the left knee. S/P L THR.  Left knee is painful and gives out at times.    30 years ago ACL surgery left knee.  Norco not helping like it used too.   Right hip hurts too.    Can't drive her car.  Needs right hip replaced eventually.   Orthopedic surgeon Dr. Candelario Barrios.   Left hip pain is worse than right hip pain.  Walks with a cane.  Objective:   /80   Pulse (!) 142   Temp 98 °F (36.7 °C)   Resp 16   Ht 5' 4\" (1.626 m)   Wt 191 lb (86.6 kg)   LMP 11/06/2015   SpO2 98%   BMI 32.79 kg/m²   Left knee tender and with valgus drift   Right hip decreased internal rotation.  Lungs clear Heart nsr   11/7/23  Xray Left Knee - [place] - severe tricompartmental osteoarthritis with complete loss of joint space.   Assessment:     Encounter Diagnoses   Name Primary?    Primary osteoarthritis of both knees Yes    Other osteoarthritis of spine, lumbar region     History of total left hip replacement      Plan:     Patient Instructions   Trial of a new medicine for control of arthritis pain.  Discontinue Norco.  Trial of hydromorphone/Dilaudid 4 mg 1 every 4 hours as needed for pain control.  Patient has advanced arthritis in both left knee and right hip.  Eventually needs both replaced.  Cannot take NSAIDs.  If the hydromorphone is not helpful call me and we will try some other pain medicine.  Hydromorphone can lead to addiction to take as few as possible.  Use Zofran as needed for nausea.  Continue paroxetine 40 mg a day for depression.  See Dr. Candelario Godinez your  orthopedic surgeon.  Return to office for recheck 3 months.        Wayne Negrete MD 5/14/2024 1:03 PM

## 2024-05-14 NOTE — PATIENT INSTRUCTIONS
Trial of a new medicine for control of arthritis pain.  Discontinue Norco.  Trial of hydromorphone/Dilaudid 4 mg 1 every 4 hours as needed for pain control.  Patient has advanced arthritis in both left knee and right hip.  Eventually needs both replaced.  Cannot take NSAIDs.  If the hydromorphone is not helpful call me and we will try some other pain medicine.  Hydromorphone can lead to addiction to take as few as possible.  Use Zofran as needed for nausea.  Continue paroxetine 40 mg a day for depression.  See Dr. Candelario Godinez your orthopedic surgeon.  Return to office for recheck 3 months.

## 2024-05-23 DIAGNOSIS — F41.1 ANXIETY STATE: ICD-10-CM

## 2024-05-23 RX ORDER — ALPRAZOLAM 0.5 MG/1
0.5 TABLET ORAL 3 TIMES DAILY PRN
Qty: 40 TABLET | Refills: 0 | Status: SHIPPED | OUTPATIENT
Start: 2024-05-23

## 2024-05-23 NOTE — TELEPHONE ENCOUNTER
Alprazolam 0.5 mg  Filled 4-26-24  Qty 40  0 refills  Future Appointments   Date Time Provider Department Center   6/27/2024  8:40 AM [facility] DEXA RM1 [facility] DEXA EM [facility]   8/20/2024 10:40 AM Wayne Negrete MD [facility] 3-5-24

## 2024-05-27 ENCOUNTER — PATIENT MESSAGE (OUTPATIENT)
Dept: RHEUMATOLOGY | Facility: CLINIC | Age: 59
End: 2024-05-27

## 2024-05-27 DIAGNOSIS — M16.11 PRIMARY OSTEOARTHRITIS OF ONE HIP, RIGHT: ICD-10-CM

## 2024-05-27 DIAGNOSIS — M17.0 PRIMARY OSTEOARTHRITIS OF BOTH KNEES: ICD-10-CM

## 2024-05-27 DIAGNOSIS — M47.896 OTHER OSTEOARTHRITIS OF SPINE, LUMBAR REGION: ICD-10-CM

## 2024-05-28 RX ORDER — HYDROCODONE BITARTRATE AND ACETAMINOPHEN 10; 325 MG/1; MG/1
1 TABLET ORAL EVERY 4 HOURS PRN
Qty: 180 TABLET | Refills: 0 | Status: SHIPPED | OUTPATIENT
Start: 2024-07-27

## 2024-05-28 RX ORDER — HYDROCODONE BITARTRATE AND ACETAMINOPHEN 10; 325 MG/1; MG/1
1 TABLET ORAL EVERY 4 HOURS PRN
Qty: 180 TABLET | Refills: 0 | Status: SHIPPED | OUTPATIENT
Start: 2024-06-27

## 2024-05-28 RX ORDER — HYDROCODONE BITARTRATE AND ACETAMINOPHEN 10; 325 MG/1; MG/1
1 TABLET ORAL EVERY 4 HOURS PRN
Qty: 180 TABLET | Refills: 0 | Status: SHIPPED | OUTPATIENT
Start: 2024-05-28

## 2024-05-28 NOTE — TELEPHONE ENCOUNTER
From: Lizbeth Berkowitz  To: Wayne Negrete  Sent: 5/27/2024 1:43 PM CDT  Subject: Hydromorphone med    Dear Dr. Negrete,  In reference to the above subject med, I am not doing very well with this med. I have much stomach pain, constipation, not feeling well overall, it's not controlling my pain any better than Norco. So, with all this in mind, I would like to go back on the Norco 10mg/325 dose. If you could please send this over to [facility] in [place] on [address]. I would greatly appreciate it. And also for the remaining June and July scripts until I see you again in August. Also, could you please cancel the two refills of the hydromophone at [facility] in [place] on [address].  Thank you.  Lizbeth Berkowitz

## 2024-05-29 ENCOUNTER — TELEPHONE (OUTPATIENT)
Dept: RHEUMATOLOGY | Facility: CLINIC | Age: 59
End: 2024-05-29

## 2024-05-29 NOTE — TELEPHONE ENCOUNTER
Dr Negrete-- see pt message and advise. Last dispense of hydromorphone was on 5/14/2024 at 180 tablets for 30 day supply. MCM also sent stating due to shortage to call local pharmacy for in stock availability. Awaiting possible response.

## 2024-05-29 NOTE — TELEPHONE ENCOUNTER
Called pharmacy and spoke with pharmacist. Informed him patient is switching back due to side effects with the hydromorphone.   [facility] pharmacist verbalized understanding and is having the patient bring in excess medication to dispose. Informed me if she is unable to bring in the correct left over quantity, then he will be unable to dispense norco.     Patient informed.

## 2024-05-30 NOTE — TELEPHONE ENCOUNTER
Dr. Negrete, I called [facility] pharmacy in [place] to authorize marcelina to refill [place] for Lizbeth.  Lizbeth could not take the hydromorphone, she is to discontinue the hydromorphone, and marcelina to start Norco 10 mg 1 every 4-6 hours as needed for pain.  Dr. Negrete

## 2024-06-10 DIAGNOSIS — F41.1 ANXIETY STATE: ICD-10-CM

## 2024-06-10 RX ORDER — ALPRAZOLAM 0.5 MG/1
0.5 TABLET ORAL 3 TIMES DAILY PRN
Qty: 40 TABLET | Refills: 0 | Status: SHIPPED | OUTPATIENT
Start: 2024-06-10

## 2024-06-10 NOTE — TELEPHONE ENCOUNTER
Alprazolam 0.5 mg  Filled 5-23-24  Qty 40  0 refills  Future Appointments   Date Time Provider Department Center   6/27/2024  8:40 AM [facility] DEXA RM1 [facility] DEXA [facility]   8/20/2024 10:40 AM Wayne Negrete MD [facility] 3-5-24

## 2024-06-27 DIAGNOSIS — F41.1 ANXIETY STATE: ICD-10-CM

## 2024-06-27 RX ORDER — ALPRAZOLAM 0.5 MG/1
0.5 TABLET ORAL 3 TIMES DAILY PRN
Qty: 40 TABLET | Refills: 0 | Status: SHIPPED | OUTPATIENT
Start: 2024-06-27

## 2024-06-27 NOTE — TELEPHONE ENCOUNTER
Alprazolam 0.5 mg  Filled 6-10-24  Qty 40  0 refills  Future Appointments   Date Time Provider Department Center   8/20/2024 10:40 AM [facility] 3-5-24

## 2024-07-01 ENCOUNTER — HOSPITAL ENCOUNTER (OUTPATIENT)
Dept: BONE DENSITY | Age: 59
Discharge: HOME | End: 2024-07-01
Attending: FAMILY MEDICINE
Payer: MEDICARE

## 2024-07-01 DIAGNOSIS — Z78.0 POSTMENOPAUSAL ESTROGEN DEFICIENCY: ICD-10-CM

## 2024-07-01 PROCEDURE — 77080 DXA BONE DENSITY AXIAL: CPT | Performed by: FAMILY MEDICINE

## 2024-07-14 DIAGNOSIS — F41.1 ANXIETY STATE: ICD-10-CM

## 2024-07-15 RX ORDER — ALPRAZOLAM 0.5 MG/1
0.5 TABLET ORAL 3 TIMES DAILY PRN
Qty: 40 TABLET | Refills: 0 | Status: SHIPPED | OUTPATIENT
Start: 2024-07-15

## 2024-07-30 DIAGNOSIS — F41.1 ANXIETY STATE: ICD-10-CM

## 2024-07-30 RX ORDER — ALPRAZOLAM 0.5 MG/1
0.5 TABLET ORAL 3 TIMES DAILY PRN
Qty: 40 TABLET | Refills: 0 | Status: SHIPPED | OUTPATIENT
Start: 2024-07-30

## 2024-08-01 DIAGNOSIS — E11.59 HYPERTENSION ASSOCIATED WITH TYPE 2 DIABETES MELLITUS (HCC): ICD-10-CM

## 2024-08-01 DIAGNOSIS — I15.2 HYPERTENSION ASSOCIATED WITH TYPE 2 DIABETES MELLITUS (HCC): ICD-10-CM

## 2024-08-02 DIAGNOSIS — E11.9 TYPE 2 DIABETES MELLITUS WITHOUT COMPLICATION, WITHOUT LONG-TERM CURRENT USE OF INSULIN (HCC): ICD-10-CM

## 2024-08-02 RX ORDER — ROSUVASTATIN CALCIUM 10 MG/1
10 TABLET, COATED ORAL NIGHTLY
Qty: 90 TABLET | Refills: 0 | Status: SHIPPED | OUTPATIENT
Start: 2024-08-02

## 2024-08-02 RX ORDER — LOSARTAN POTASSIUM 100 MG/1
100 TABLET ORAL DAILY
Qty: 90 TABLET | Refills: 0 | Status: SHIPPED | OUTPATIENT
Start: 2024-08-02

## 2024-08-02 RX ORDER — HYDROCHLOROTHIAZIDE 25 MG/1
25 TABLET ORAL DAILY
Qty: 90 TABLET | Refills: 0 | Status: SHIPPED | OUTPATIENT
Start: 2024-08-02

## 2024-08-02 RX ORDER — GLIMEPIRIDE 4 MG/1
4 TABLET ORAL
Qty: 90 TABLET | Refills: 0 | Status: SHIPPED | OUTPATIENT
Start: 2024-08-02

## 2024-08-02 NOTE — TELEPHONE ENCOUNTER
Glimepiride 4 mg    Diabetes Medication Protocol [id number]/02/2024 10:24 AM   Protocol Details EGFRCR or GFRNAA > 50      Filled 5-8-24  Qty 90  0 refills  Future Appointments   Date Time Provider Department Center   8/13/2024  [facility] [place]   8/20/2024 10:40 AM Wayne Negrete MD [facility] [place]   LOV 3-5-24   Labs 3-5-24 A1c/ microalb./Creat.

## 2024-08-15 DIAGNOSIS — F41.1 ANXIETY STATE: ICD-10-CM

## 2024-08-15 RX ORDER — ALPRAZOLAM 0.5 MG/1
0.5 TABLET ORAL 3 TIMES DAILY PRN
Qty: 40 TABLET | Refills: 0 | Status: SHIPPED | OUTPATIENT
Start: 2024-08-15

## 2024-08-20 ENCOUNTER — OFFICE VISIT (OUTPATIENT)
Dept: RHEUMATOLOGY | Facility: CLINIC | Age: 59
End: 2024-08-20
Payer: MEDICARE

## 2024-08-20 VITALS
SYSTOLIC BLOOD PRESSURE: 120 MMHG | BODY MASS INDEX: 33.09 KG/M2 | HEART RATE: 94 BPM | HEIGHT: 64 IN | DIASTOLIC BLOOD PRESSURE: 70 MMHG | TEMPERATURE: 96 F | WEIGHT: 193.81 LBS | OXYGEN SATURATION: 96 % | RESPIRATION RATE: 18 BRPM

## 2024-08-20 DIAGNOSIS — M25.512 TRIGGER POINT OF LEFT SHOULDER REGION: Primary | ICD-10-CM

## 2024-08-20 DIAGNOSIS — M47.896 OTHER OSTEOARTHRITIS OF SPINE, LUMBAR REGION: ICD-10-CM

## 2024-08-20 DIAGNOSIS — M16.11 PRIMARY OSTEOARTHRITIS OF ONE HIP, RIGHT: ICD-10-CM

## 2024-08-20 DIAGNOSIS — M25.512 CHRONIC LEFT SHOULDER PAIN: ICD-10-CM

## 2024-08-20 DIAGNOSIS — G89.29 CHRONIC LEFT SHOULDER PAIN: ICD-10-CM

## 2024-08-20 DIAGNOSIS — M17.0 PRIMARY OSTEOARTHRITIS OF BOTH KNEES: ICD-10-CM

## 2024-08-20 PROCEDURE — 99214 OFFICE O/P EST MOD 30 MIN: CPT | Performed by: INTERNAL MEDICINE

## 2024-08-20 PROCEDURE — 3008F BODY MASS INDEX DOCD: CPT | Performed by: INTERNAL MEDICINE

## 2024-08-20 PROCEDURE — 3074F SYST BP LT 130 MM HG: CPT | Performed by: INTERNAL MEDICINE

## 2024-08-20 PROCEDURE — 20552 NJX 1/MLT TRIGGER POINT 1/2: CPT | Performed by: INTERNAL MEDICINE

## 2024-08-20 PROCEDURE — 3078F DIAST BP <80 MM HG: CPT | Performed by: INTERNAL MEDICINE

## 2024-08-20 RX ORDER — METHYLPREDNISOLONE ACETATE 40 MG/ML
10 INJECTION, SUSPENSION INTRA-ARTICULAR; INTRALESIONAL; INTRAMUSCULAR; SOFT TISSUE ONCE
Status: COMPLETED | OUTPATIENT
Start: 2024-08-20 | End: 2024-08-20

## 2024-08-20 RX ORDER — HYDROCODONE BITARTRATE AND ACETAMINOPHEN 10; 325 MG/1; MG/1
1 TABLET ORAL EVERY 4 HOURS PRN
Qty: 180 TABLET | Refills: 0 | Status: SHIPPED | OUTPATIENT
Start: 2024-09-09

## 2024-08-20 RX ORDER — HYDROCODONE BITARTRATE AND ACETAMINOPHEN 10; 325 MG/1; MG/1
1 TABLET ORAL EVERY 4 HOURS PRN
Qty: 180 TABLET | Refills: 0 | Status: SHIPPED | OUTPATIENT
Start: 2024-11-09

## 2024-08-20 RX ORDER — CYCLOBENZAPRINE HCL 10 MG
10 TABLET ORAL 3 TIMES DAILY
Qty: 90 TABLET | Refills: 1 | Status: SHIPPED | OUTPATIENT
Start: 2024-08-20 | End: 2024-10-19

## 2024-08-20 RX ORDER — HYDROCODONE BITARTRATE AND ACETAMINOPHEN 10; 325 MG/1; MG/1
1 TABLET ORAL EVERY 4 HOURS PRN
Qty: 180 TABLET | Refills: 0 | Status: SHIPPED | OUTPATIENT
Start: 2024-10-09

## 2024-08-20 RX ADMIN — METHYLPREDNISOLONE ACETATE 10 MG: 40 INJECTION, SUSPENSION INTRA-ARTICULAR; INTRALESIONAL; INTRAMUSCULAR; SOFT TISSUE at 11:25:00

## 2024-08-20 NOTE — PROGRESS NOTES
[facility]  Dr. Negrete Progress Note     Subjective:   Lizbeth Berkowitz is a(n) 58 year old female.   Current complaints:   Chief Complaint   Patient presents with    Follow - Up     3 month follow up. C/o bilateral knees. Left more than right. Bilateral shoulders from neck to elbow. Neck pain. C/o new pain experienced this week   Chronic osteoarthritis of the left knee.  History of ACL surgery left knee years ago.  Status post left total hip replacement.  Also has osteoarthritis right knee.  Sees Dr. Candelario Godinez of [facility].  Pain in the,left shoulder started last week.  Can't lift the left shoulder overhead.  Pain in back left upper back.  Taking pain medicine but still having significant pain in the left upper back and left shoulder.  Objective:   /70 (BP Location: Left arm, Patient Position: Sitting, Cuff Size: adult)   Pulse 94   Temp (!) 96.4 °F (35.8 °C) (Temporal)   Resp 18   Ht 5' 4\" (1.626 m)   Wt 193 lb 12.8 oz (87.9 kg)   LMP 11/06/2015   SpO2 96%   BMI 33.27 kg/m² on exam lungs are clear.  Heart normal sinus rhythm.  Has a trigger point in the left upper back, edge of left scapula.  Knees are bilaterally tender.  11/7/2023 x-ray left knee severe tricompartmental osteoarthritis.  Done at [facility].  Assessment:     Encounter Diagnoses   Name Primary?    Primary osteoarthritis of both knees     Other osteoarthritis of spine, lumbar region     Primary osteoarthritis of one hip, right     Trigger point of left shoulder region Yes    Chronic left shoulder pain      Plan:     Patient Instructions   Trigger point injection done in the left upper region medial to the scapula.  Cortisone and lidocaine were injected.  Hopefully this will help kill some of the left shoulder left upper back pain.  Norco for pain relief 10 mg 1 every 4 hours up to 6 a day.  Also use cyclobenzaprine 10 mg 1 3 times a day if needed for muscle spasm.  Okay to use a heating pad to the left shoulder  left upper back.  Also okay to apply diclofenac gel or Aspercreme.  Return to office for recheck 3 months.        Wayne Negrete MD 8/20/2024 11:14 AM

## 2024-08-20 NOTE — PATIENT INSTRUCTIONS
Trigger point injection done in the left upper region medial to the scapula.  Cortisone and lidocaine were injected.  Hopefully this will help kill some of the left shoulder left upper back pain.  Norco for pain relief 10 mg 1 every 4 hours up to 6 a day.  Also use cyclobenzaprine 10 mg 1 3 times a day if needed for muscle spasm.  Okay to use a heating pad to the left shoulder left upper back.  Also okay to apply diclofenac gel or Aspercreme.  Return to office for recheck 3 months.  
Normal vision: sees adequately in most situations; can see medication labels, newsprint

## 2024-08-21 NOTE — PROCEDURES
17307  Trigger point injection left upper back  Trigger point pain left upper back.  Left shoulder pain.  After obtaining consent from the patient, the left upper back over the site of medial aspect of the scapula was cleansed with Hibiclens and alcohol wipes.  Then the trigger point was injected with 10 mg of methylprednisolone and 2 cc of 1% lidocaine.  Patient tolerated the trigger point injection without incident.

## 2024-08-23 ENCOUNTER — LAB ENCOUNTER (OUTPATIENT)
Dept: LAB | Age: 59
End: 2024-08-23
Attending: FAMILY MEDICINE
Payer: MEDICARE

## 2024-08-23 DIAGNOSIS — E11.65 TYPE 2 DIABETES MELLITUS WITH HYPERGLYCEMIA, WITHOUT LONG-TERM CURRENT USE OF INSULIN (HCC): ICD-10-CM

## 2024-08-23 DIAGNOSIS — R80.9 MICROALBUMINURIA: ICD-10-CM

## 2024-08-23 DIAGNOSIS — I10 ESSENTIAL HYPERTENSION, BENIGN: ICD-10-CM

## 2024-08-23 DIAGNOSIS — E78.00 PURE HYPERCHOLESTEROLEMIA: ICD-10-CM

## 2024-08-23 LAB
ALBUMIN SERPL-MCNC: 4.7 G/DL (ref 3.2–4.8)
ALBUMIN/GLOB SERPL: 1.6 {RATIO} (ref 1–2)
ALP LIVER SERPL-CCNC: 59 U/L
ALT SERPL-CCNC: 11 U/L
ANION GAP SERPL CALC-SCNC: 4 MMOL/L (ref 0–18)
AST SERPL-CCNC: 24 U/L (ref ?–34)
BILIRUB SERPL-MCNC: 0.3 MG/DL (ref 0.2–1.1)
BUN BLD-MCNC: 19 MG/DL (ref 9–23)
CALCIUM BLD-MCNC: 10.3 MG/DL (ref 8.7–10.4)
CHLORIDE SERPL-SCNC: 102 MMOL/L (ref 98–112)
CHOLEST SERPL-MCNC: 130 MG/DL (ref ?–200)
CO2 SERPL-SCNC: 33 MMOL/L (ref 21–32)
CREAT BLD-MCNC: 1.24 MG/DL
CREAT UR-SCNC: 70.9 MG/DL
EGFRCR SERPLBLD CKD-EPI 2021: 50 ML/MIN/1.73M2 (ref 60–?)
EST. AVERAGE GLUCOSE BLD GHB EST-MCNC: 134 MG/DL (ref 68–126)
FASTING PATIENT LIPID ANSWER: YES
FASTING STATUS PATIENT QL REPORTED: YES
GLOBULIN PLAS-MCNC: 2.9 G/DL (ref 2–3.5)
GLUCOSE BLD-MCNC: 90 MG/DL (ref 70–99)
HBA1C MFR BLD: 6.3 % (ref ?–5.7)
HDLC SERPL-MCNC: 41 MG/DL (ref 40–59)
LDLC SERPL CALC-MCNC: 62 MG/DL (ref ?–100)
MICROALBUMIN UR-MCNC: 2.1 MG/DL
MICROALBUMIN/CREAT 24H UR-RTO: 29.6 UG/MG (ref ?–30)
NONHDLC SERPL-MCNC: 89 MG/DL (ref ?–130)
OSMOLALITY SERPL CALC.SUM OF ELEC: 290 MOSM/KG (ref 275–295)
POTASSIUM SERPL-SCNC: 4.1 MMOL/L (ref 3.5–5.1)
PROT SERPL-MCNC: 7.6 G/DL (ref 5.7–8.2)
SODIUM SERPL-SCNC: 139 MMOL/L (ref 136–145)
TRIGL SERPL-MCNC: 160 MG/DL (ref 30–149)
VLDLC SERPL CALC-MCNC: 24 MG/DL (ref 0–30)

## 2024-08-23 PROCEDURE — 36415 COLL VENOUS BLD VENIPUNCTURE: CPT

## 2024-08-23 PROCEDURE — 82570 ASSAY OF URINE CREATININE: CPT

## 2024-08-23 PROCEDURE — 83036 HEMOGLOBIN GLYCOSYLATED A1C: CPT

## 2024-08-23 PROCEDURE — 82043 UR ALBUMIN QUANTITATIVE: CPT

## 2024-08-23 PROCEDURE — 80061 LIPID PANEL: CPT

## 2024-08-23 PROCEDURE — 80053 COMPREHEN METABOLIC PANEL: CPT

## 2024-08-27 ENCOUNTER — PATIENT MESSAGE (OUTPATIENT)
Dept: INTERNAL MEDICINE CLINIC | Facility: CLINIC | Age: 59
End: 2024-08-27

## 2024-08-27 NOTE — TELEPHONE ENCOUNTER
From: Lizbeth Berkowitz  To: Meera Velazquezur  Sent: 8/27/2024 2:30 PM CDT  Subject: Flu shot    Homero Nagel     Got my flu shot today, Tuesday, August 27th, 2024, at [facility] in [place], [place] on [address]. Please put in my file.     Thank you.    Lizbeth Berkowitz

## 2024-08-31 DIAGNOSIS — F41.1 ANXIETY STATE: ICD-10-CM

## 2024-09-01 RX ORDER — ALPRAZOLAM 0.5 MG
0.5 TABLET ORAL 3 TIMES DAILY PRN
Qty: 40 TABLET | Refills: 0 | OUTPATIENT
Start: 2024-09-01

## 2024-09-03 NOTE — TELEPHONE ENCOUNTER
Faxed Rx to pharmacy. Rx confirmation is in faxed rx file on my desk. [FreeTextEntry1] : 61-year-old male with history of hypercholesteremia, nonobstructive carotid artery disease, HTN, and a family history of premature CAD, who presents to the office for preoperative cardiac assessment for upcoming right arthroscopic rotator cuff repair on 9/4/2024. Discussed with Dr. Flanagan.  Assessment/ Plan: Patient denies cardiac symptoms. BP is stable. EKG reviewed from [facility] revealing normal sinus rhythm and no ischemic ST/T changes. Last TTE in 2022 revealed LVEF ~50, mild AR and MR. Nuclear stress test was normal 1/2022. Patient is stable from a cardiac perspective to proceed with right arthroscopic rotator cuff repair as planned. He may hold Aspirin for 7 days prior to surgery, then resume asap after surgery when deemed safe by surgical team. Follow up with Dr. Flanagan in 1 year or sooner if needed.  Patient verbalized understanding and is in agreement with the above plan.  Yaritza DENNEY I have reviewed the patient's history, physical exam, assessment, and plan. I agree with the assessment and plan. David Flanagan MD

## 2024-09-04 DIAGNOSIS — F41.1 ANXIETY STATE: ICD-10-CM

## 2024-09-04 RX ORDER — ALPRAZOLAM 0.5 MG
0.5 TABLET ORAL 3 TIMES DAILY PRN
Qty: 40 TABLET | Refills: 0 | OUTPATIENT
Start: 2024-09-04

## 2024-09-04 NOTE — TELEPHONE ENCOUNTER
Alprazolam 0.5 mg  Filled 8-15-24   Qty 40  0 refills  Future Appointments   Date Time Provider Department Center   11/20/2024 10:00 AM Wayne Negrete MD [facility] 3-5-24     Message sent to pt to schedule an appt. This month

## 2024-09-05 RX ORDER — ALPRAZOLAM 0.5 MG
0.5 TABLET ORAL 3 TIMES DAILY PRN
Qty: 40 TABLET | Refills: 0 | Status: SHIPPED | OUTPATIENT
Start: 2024-09-05

## 2024-09-05 NOTE — TELEPHONE ENCOUNTER
Future Appointments   Date Time Provider Department Center   9/10/2024 11:30 AM Meera Navarrete APRN [facility]   11/20/2024 10:00 AM Wayne Negrete MD [facility] [place]

## 2024-09-10 ENCOUNTER — OFFICE VISIT (OUTPATIENT)
Dept: INTERNAL MEDICINE CLINIC | Facility: CLINIC | Age: 59
End: 2024-09-10
Payer: MEDICARE

## 2024-09-10 VITALS
HEIGHT: 63.78 IN | SYSTOLIC BLOOD PRESSURE: 118 MMHG | BODY MASS INDEX: 32.6 KG/M2 | OXYGEN SATURATION: 97 % | HEART RATE: 112 BPM | RESPIRATION RATE: 16 BRPM | WEIGHT: 188.63 LBS | DIASTOLIC BLOOD PRESSURE: 76 MMHG | TEMPERATURE: 97 F

## 2024-09-10 DIAGNOSIS — F41.1 ANXIETY STATE: ICD-10-CM

## 2024-09-10 DIAGNOSIS — F33.0 MILD RECURRENT MAJOR DEPRESSION (HCC): Chronic | ICD-10-CM

## 2024-09-10 DIAGNOSIS — E11.65 TYPE 2 DIABETES MELLITUS WITH HYPERGLYCEMIA, WITHOUT LONG-TERM CURRENT USE OF INSULIN (HCC): Chronic | ICD-10-CM

## 2024-09-10 DIAGNOSIS — E78.00 PURE HYPERCHOLESTEROLEMIA: ICD-10-CM

## 2024-09-10 DIAGNOSIS — Z12.11 ENCOUNTER FOR SCREENING FECAL OCCULT BLOOD TESTING: Primary | ICD-10-CM

## 2024-09-10 DIAGNOSIS — Z12.31 ENCOUNTER FOR SCREENING MAMMOGRAM FOR MALIGNANT NEOPLASM OF BREAST: ICD-10-CM

## 2024-09-10 DIAGNOSIS — I10 ESSENTIAL HYPERTENSION, BENIGN: ICD-10-CM

## 2024-09-10 PROCEDURE — 99213 OFFICE O/P EST LOW 20 MIN: CPT | Performed by: FAMILY MEDICINE

## 2024-09-10 PROCEDURE — 3061F NEG MICROALBUMINURIA REV: CPT | Performed by: FAMILY MEDICINE

## 2024-09-10 PROCEDURE — 3078F DIAST BP <80 MM HG: CPT | Performed by: FAMILY MEDICINE

## 2024-09-10 PROCEDURE — 3044F HG A1C LEVEL LT 7.0%: CPT | Performed by: FAMILY MEDICINE

## 2024-09-10 PROCEDURE — 3051F HG A1C>EQUAL 7.0%<8.0%: CPT | Performed by: FAMILY MEDICINE

## 2024-09-10 PROCEDURE — 3074F SYST BP LT 130 MM HG: CPT | Performed by: FAMILY MEDICINE

## 2024-09-10 PROCEDURE — 3060F POS MICROALBUMINURIA REV: CPT | Performed by: FAMILY MEDICINE

## 2024-09-10 PROCEDURE — 3008F BODY MASS INDEX DOCD: CPT | Performed by: FAMILY MEDICINE

## 2024-09-10 NOTE — PROGRESS NOTES
HPI:   Lizbeth Berkowitz is a 58 year old female who presents for recheck of her diabetes.   Patient’ does not check her blood sugars  Last visit with ophthalmologist- Pt has appt next thursday.    Pt has been checking her feet on a regular basis. Pt has some nunbness of the feet.     Patient presents for recheck of their hypertension.hyperlipidemia. Pt has been taking medications as instructed, no medication side effects, home BP monitoring has not been done.  Currently asymptomatic, no chest pains, jaw pains, arm pains. No headaches, dizziness or edema.  No SOB on exertion or rest.  Pt has been trying to follow a low fat diet and has not been exercising but does try to walk more and goes up and down stairs all day     Pt is here for a recheck of anxiety and depression. Has been tolerating the meds well. Denies any side effects from the meds. Mood has been good. Patient's appetite is good.Pt denies headaches,tics,or insomnia.No depressive symptoms or suicidal ideations. Would like to continue the same medication. .    Wt Readings from Last 6 Encounters:   09/10/24 188 lb 9.6 oz (85.5 kg)   08/20/24 193 lb 12.8 oz (87.9 kg)   05/14/24 191 lb (86.6 kg)   03/05/24 198 lb 9.6 oz (90.1 kg)   02/13/24 201 lb (91.2 kg)   11/15/23 207 lb (93.9 kg)     Body mass index is 32.6 kg/m².     HEMOGLOBIN A1c (% of total Hgb)   Date Value   05/01/2019 6.0 (H)   03/05/2013 5.7 (H)   12/28/2011 6.4 (H)     HGBA1C (%)   Date Value   09/19/2013 6.0 (H)     HgbA1C (%)   Date Value   08/23/2024 6.3 (H)   03/05/2024 7.0 (H)   11/10/2023 7.2 (H)   08/09/2018 6.1     Cholesterol, Total (mg/dL)   Date Value   08/23/2024 130   03/05/2024 119   10/26/2023 119   08/09/2018 126   08/09/2018 126     CHOLESTEROL, TOTAL (mg/dL)   Date Value   05/01/2019 142   03/05/2013 147   12/28/2011 166     CHOLESTEROL (mg/dL)   Date Value   09/19/2013 165     HDL Cholesterol (mg/dL)   Date Value   08/23/2024 41   03/05/2024 37 (L)   10/26/2023 42   08/09/2018 40    08/09/2018 50     HDL CHOLESTEROL (mg/dL)   Date Value   05/01/2019 41 (L)   03/05/2013 45 (L)   12/28/2011 48     HDL CHOL (mg/dL)   Date Value   09/19/2013 43 (L)     LDL Cholesterol (mg/dL)   Date Value   08/23/2024 62   03/05/2024 46   10/26/2023 40     LDL-CHOLESTEROL (mg/dL (calc))   Date Value   05/01/2019 71   03/05/2013 61   12/28/2011 80     LDL CHOLESTROL (mg/dL)   Date Value   09/19/2013 76     AST (U/L)   Date Value   08/23/2024 24   03/05/2024 16   11/10/2023 20   05/01/2019 17   09/19/2013 29   03/05/2013 14   12/28/2011 19     ALT (U/L)   Date Value   08/23/2024 11   03/05/2024 19   11/10/2023 10   05/01/2019 12   09/19/2013 26   03/05/2013 11   12/28/2011 7      Malb/Cre Calc   Date Value Ref Range Status   08/23/2024 29.6 <=30.0 ug/mg Final     Comment:     <30 ug/mg creatinine       Normal     ug/mg creatinine   Microalbuminuria   >300 ug/mg creatinine      Albuminuria       03/05/2024 53.6 (H) <=30.0 ug/mg Final     Comment:     <30 ug/mg creatinine       Normal     ug/mg creatinine   Microalbuminuria   >300 ug/mg creatinine      Albuminuria       10/26/2023 55.4 (H) <=30.0 ug/mg Final     Comment:     <30 ug/mg creatinine       Normal     ug/mg creatinine   Microalbuminuria   >300 ug/mg creatinine      Albuminuria           Current Outpatient Medications   Medication Sig Dispense Refill    ALPRAZolam 0.5 MG Oral Tab Take 1 tablet (0.5 mg total) by mouth 3 (three) times daily as needed. APPT DUE SEPTEMBER 40 tablet 0    HYDROcodone-acetaminophen (NORCO)  MG Oral Tab Take 1 tablet by mouth every 4 (four) hours as needed for Pain. 180 tablet 0    cyclobenzaprine 10 MG Oral Tab Take 1 tablet (10 mg total) by mouth 3 (three) times daily. 90 tablet 1    LOSARTAN 100 MG Oral Tab TAKE 1 TABLET BY MOUTH EVERY DAY 90 tablet 0    HYDROCHLOROTHIAZIDE 25 MG Oral Tab TAKE 1 TABLET BY MOUTH EVERY DAY 90 tablet 0    ROSUVASTATIN 10 MG Oral Tab TAKE 1 TABLET BY MOUTH NIGHTLY 90 tablet 0     GLIMEPIRIDE 4 MG Oral Tab TAKE 1 TABLET BY MOUTH BEFORE BREAKFAST 90 tablet 0    metFORMIN HCl 1000 MG Oral Tab Take 1 tablet (1,000 mg total) by mouth 2 (two) times daily with meals. 180 tablet 1    ondansetron 4 MG Oral Tablet Dispersible Place 1 tablet (4 mg total) under the tongue every 8 (eight) hours as needed for Nausea. 60 tablet 1    PARoxetine HCl 40 MG Oral Tab Take 1 tablet (40 mg total) by mouth every morning. 30 tablet 5    Cholecalciferol (VITAMIN D) 50 MCG (2000 UT) Oral Cap Take 1 capsule (2,000 Units total) by mouth daily.      Esomeprazole Magnesium 20 MG Oral Powd Pack Take 20 mg by mouth daily.      [START ON 10/9/2024] HYDROcodone-acetaminophen (NORCO)  MG Oral Tab Take 1 tablet by mouth every 4 (four) hours as needed for Pain. 180 tablet 0    [START ON 2024] HYDROcodone-acetaminophen (NORCO)  MG Oral Tab Take 1 tablet by mouth every 4 (four) hours as needed for Pain. 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    biopsy    Concussion without loss of consciousness    20    Diabetes (HCC)    Essential hypertension    Hearing impairment    no hearing aids L>R    High blood pressure    High cholesterol    Osteoarthritis    Personal history of urinary calculi    Rheumatoid arthritis (HCC)    Uterus, adenomyosis    Visual impairment    glasses      Past Surgical History:   Procedure Laterality Date      1993    x 2    Cholecystectomy      D & c      after miscarriage    Femur/knee surg unlisted      repair of left knee ligament cruciate anterior    Hip replacement surgery Left         Other      lithotripsy x 3    Other surgical history      cystoscopy (diagnostic)    Tubal ligation      Upper gi endoscopy,exam        Social History:   Social History      Socioeconomic History    Marital status:    Tobacco Use    Smoking status: Every Day     Current packs/day: 0.50     Types: Cigarettes    Smokeless tobacco: Never    Tobacco comments:     Smokes 4 cigarettes daily   Vaping Use    Vaping status: Never Used   Substance and Sexual Activity    Alcohol use: No     Alcohol/week: 0.0 standard drinks of alcohol    Drug use: No   Other Topics Concern    Caffeine Concern Yes     Comment: At least 3 cans of Dr. Pepper daily.     Exercise No     Social Determinants of Health      Received from [facility]     Exercise: minimal, walking.  Diet: watches fats closely and watches sugar closely     REVIEW OF SYSTEMS:   Constitutional: Negative for fatigue and unexpected weight change.   HENT: Negative for dental problem.    Eyes: Negative for visual disturbance.   Respiratory: Negative for cough and shortness of breath.    Cardiovascular: Negative for chest pain, palpitations and leg swelling.   Gastrointestinal: Negative for nausea, diarrhea and constipation.   Endocrine: Negative for polydipsia, polyphagia and polyuria.   Genitourinary: Negative for dysuria.   Musculoskeletal: Negative for joint pain.   Skin: Negative for rashes  Neurological: Negative for numbness.   Hematological:Negative for hypoglycemia   Psychiatric/Behavioral: Negative for sleep disturbance.    EXAM:   /76 (BP Location: Right arm, Patient Position: Sitting, Cuff Size: adult)   Pulse 112   Temp 96.6 °F (35.9 °C) (Temporal)   Resp 16   Ht 5' 3.78\" (1.62 m)   Wt 188 lb 9.6 oz (85.5 kg)   LMP 11/06/2015   SpO2 97%   BMI 32.60 kg/m²   GENERAL: well developed, well nourished,in no apparent distress, pleasant, overweight  SKIN: no rashes,no suspicious lesions  HEENT: NC/AT, PERRLA, EOMs intact b/l. Ears clear b/l, throat without erythema or exudate.   NECK: supple,no adenopathy,no bruits.  No thyromegaly.   LUNGS: clear to auscultation, no W/R/R.  CARDIO: RRR  without murmur  EXTREMITIES: no cyanosis, clubbing or edema; Shoes and socks were removed-- Bilateral barefoot skin diabetic exam is normal, visualized feet sore noted on the top of right great toe. Area redden,non tender,no drg.other wise feet normal.  Bilateral monofilament/sensation of both feet. Right foot less sensation on the outer aspect of the foot, and with the left foot middle of the foot less sensation  Pulsation pedal pulse exam of both lower legs/feet is normal as well.  NEURO: CN II-XII intact. DTRs 2/4 B/L UE.        ASSESSMENT AND PLAN:   Lizbeth Berkowitz is a 58 year old female who presents for a recheck of her    1. Encounter for screening fecal occult blood testing    - Occult Blood, Fecal, FIT Immunoassay; Future    2. Encounter for screening mammogram for malignant neoplasm of breast    - [facility] SAMUEL 2D+3D SCREENING BILAT (CPT=77067/33963); Future    3. Mild recurrent major depression (HCC)  -stable, continue paxil    4. Type 2 diabetes mellitus with hyperglycemia, without long-term current use of insulin (HCC)  Continue present medications. Pt encouraged to check sugars daily  Please see your specialists as recommended.  Keep up with regular eye exams and Check feet daily. Stay on your low salt, diabetic diet.  Stay as active as you can.  See me back as scheduled, every 6 months.       5. Pure hypercholesterolemia  Pt to continue their medication, increase exercise,  choose better fats,  increase fiber and avoid processed foods.    6. Essential hypertension, benign  Conservative measures dicussed. Continue medication.  Diet and exercise explained and encouraged.  Home blood pressure monitoring. Pt should measure BP’s two to three times per week. Goal blood pressure at home - < 135/85.       7. Anxiety state  Stable, continue Paxil and Xanax as needed    The patient indicates understanding of these issues and agrees to the plan.  The patient is asked to return in 6 months,sooner if needed. Pt will  call when she needs refills in the next 1-2 months..

## 2024-09-13 DIAGNOSIS — R11.0 NAUSEA: ICD-10-CM

## 2024-09-16 RX ORDER — ONDANSETRON 4 MG/1
4 TABLET, ORALLY DISINTEGRATING ORAL EVERY 8 HOURS PRN
Qty: 60 TABLET | Refills: 1 | Status: SHIPPED | OUTPATIENT
Start: 2024-09-16

## 2024-09-16 NOTE — TELEPHONE ENCOUNTER
Future Appointments   Date Time Provider Department Center   10/28/2024 11:40 AM [facility] RM1 [facility] [place]   11/20/2024 10:00 AM Wayne Negrete MD [facility] [place]     Last office visit: 8/20/2024    Last fill: 4/26/2024 60 tab, 1 refill

## 2024-09-20 DIAGNOSIS — F41.1 ANXIETY STATE: ICD-10-CM

## 2024-09-22 RX ORDER — ALPRAZOLAM 0.5 MG
0.5 TABLET ORAL 3 TIMES DAILY PRN
Qty: 40 TABLET | Refills: 0 | Status: SHIPPED | OUTPATIENT
Start: 2024-09-22

## 2024-09-30 DIAGNOSIS — F41.1 ANXIETY STATE: ICD-10-CM

## 2024-09-30 DIAGNOSIS — F33.0 MILD RECURRENT MAJOR DEPRESSION (HCC): ICD-10-CM

## 2024-10-01 DIAGNOSIS — F33.0 MILD RECURRENT MAJOR DEPRESSION (HCC): ICD-10-CM

## 2024-10-01 DIAGNOSIS — F41.1 ANXIETY STATE: ICD-10-CM

## 2024-10-01 RX ORDER — PAROXETINE 40 MG/1
40 TABLET, FILM COATED ORAL EVERY MORNING
Qty: 30 TABLET | Refills: 0 | OUTPATIENT
Start: 2024-10-01

## 2024-10-01 RX ORDER — PAROXETINE 40 MG/1
40 TABLET, FILM COATED ORAL EVERY MORNING
Qty: 30 TABLET | Refills: 5 | Status: SHIPPED | OUTPATIENT
Start: 2024-10-01

## 2024-10-01 NOTE — TELEPHONE ENCOUNTER
Paroxetine 40 mg  Filled 4-1-24  Qty 30  5 refills  Future Appointments   Date Time Provider Department Center   10/28/2024 11:40 AM OSWALD [place] [address] [facility] [place]   11/20/2024 10:00 AM Wayne Negrete MD [facility] 9-10-24

## 2024-10-06 DIAGNOSIS — F41.1 ANXIETY STATE: ICD-10-CM

## 2024-10-07 RX ORDER — ALPRAZOLAM 0.5 MG
0.5 TABLET ORAL 3 TIMES DAILY PRN
Qty: 40 TABLET | Refills: 0 | Status: SHIPPED | OUTPATIENT
Start: 2024-10-07

## 2024-10-07 NOTE — TELEPHONE ENCOUNTER
Alprazolam 0.5 mg  Filled 9-22-24  Qty 40  0 refills  Future Appointments   Date Time Provider Department Center   10/28/2024 11:40 AM OSWALD [facility] 1 [facility] [place]   11/20/2024 10:00 AM Wayne Negrete MD [facility] 9-10-24

## 2024-10-20 DIAGNOSIS — F41.1 ANXIETY STATE: ICD-10-CM

## 2024-10-21 ENCOUNTER — PATIENT MESSAGE (OUTPATIENT)
Dept: INTERNAL MEDICINE CLINIC | Facility: CLINIC | Age: 59
End: 2024-10-21

## 2024-10-21 RX ORDER — ALPRAZOLAM 0.5 MG
0.5 TABLET ORAL 3 TIMES DAILY PRN
Qty: 40 TABLET | Refills: 0 | OUTPATIENT
Start: 2024-10-21

## 2024-10-22 DIAGNOSIS — E11.9 TYPE 2 DIABETES MELLITUS WITHOUT COMPLICATION, WITHOUT LONG-TERM CURRENT USE OF INSULIN (HCC): ICD-10-CM

## 2024-10-22 NOTE — TELEPHONE ENCOUNTER
Metformin 1000 mg    Diabetes Medication Protocol [id number]/22/2024 10:18 AM   Protocol Details EGFRCR or GFRNAA > 50      Filled 4-26-24  Qty 180  1 refill  Future Appointments   Date Time Provider Department Center   11/14/2024 10:00 AM MATILDA [facility] MATILDA [facility]   11/20/2024 10:00 AM Wayne Negrete MD [facility] [place]   LOV 9-10-24 SM  Labs 8-23-24 A1c/ microalb./Creat.

## 2024-10-27 DIAGNOSIS — F41.1 ANXIETY STATE: ICD-10-CM

## 2024-10-28 RX ORDER — ALPRAZOLAM 0.5 MG
0.5 TABLET ORAL 3 TIMES DAILY PRN
Qty: 40 TABLET | Refills: 0 | Status: SHIPPED | OUTPATIENT
Start: 2024-10-28

## 2024-10-28 NOTE — TELEPHONE ENCOUNTER
Alprazolam 0.5 mg  Filled 10-7-24  Qty 40  0 refills  Future Appointments   Date Time Provider Department Center   11/14/2024 10:00 AM MATILDA [facility] D [facility]   11/20/2024 10:00 AM Wayne Negrete MD [facility] 9-10-24

## 2024-10-31 DIAGNOSIS — E11.9 TYPE 2 DIABETES MELLITUS WITHOUT COMPLICATION, WITHOUT LONG-TERM CURRENT USE OF INSULIN (HCC): ICD-10-CM

## 2024-10-31 DIAGNOSIS — I15.2 HYPERTENSION ASSOCIATED WITH TYPE 2 DIABETES MELLITUS (HCC): ICD-10-CM

## 2024-10-31 DIAGNOSIS — E11.59 HYPERTENSION ASSOCIATED WITH TYPE 2 DIABETES MELLITUS (HCC): ICD-10-CM

## 2024-11-01 RX ORDER — GLIMEPIRIDE 4 MG/1
4 TABLET ORAL
Qty: 90 TABLET | Refills: 0 | Status: SHIPPED | OUTPATIENT
Start: 2024-11-01

## 2024-11-01 RX ORDER — LOSARTAN POTASSIUM 100 MG/1
100 TABLET ORAL DAILY
Qty: 90 TABLET | Refills: 0 | Status: SHIPPED | OUTPATIENT
Start: 2024-11-01

## 2024-11-01 RX ORDER — ROSUVASTATIN CALCIUM 10 MG/1
10 TABLET, COATED ORAL NIGHTLY
Qty: 90 TABLET | Refills: 0 | Status: SHIPPED | OUTPATIENT
Start: 2024-11-01

## 2024-11-01 RX ORDER — HYDROCHLOROTHIAZIDE 25 MG/1
25 TABLET ORAL DAILY
Qty: 90 TABLET | Refills: 0 | Status: SHIPPED | OUTPATIENT
Start: 2024-11-01

## 2024-11-01 NOTE — TELEPHONE ENCOUNTER
Losartan 100 mg    Hypertension Medications Protocol Gphspn24/31/2024 07:16 PM   Protocol Details EGFRCR or GFRNAA > 50      Filled 8-2-24  Qty 90  0 refills  Future Appointments   Date Time Provider Department Center   11/14/2024 10:00 AM HND [facility] RM1 [facility]   11/20/2024 10:00 AM Wayne Negrete MD [facility]   LOV 9-10-24 SM    Hydrochlorothiazide 25 mg    Hypertension Medications Protocol Dtscoj44/31/2024 07:16 PM   Protocol Details EGFRCR or GFRNAA > 50      Filled 8-2-24  Qty 90  0 refills  Future Appointments   Date Time Provider Department Center   11/14/2024 10:00 AM H[facility] RM1 [facility]   11/20/2024 10:00 AM Wayne Negrete MD [facility]   LOV 9-10-24 SM    Glimepiride 4 mg    Diabetes Medication Protocol Tadbpf07/31/2024 07:16 PM   Protocol Details EGFRCR or GFRNAA > 50      Filled 8-2-24  Qty 90  0 refills  Future Appointments   Date Time Provider Department Center   11/14/2024 10:00 AM HND [facility] RM1 [facility]   11/20/2024 10:00 AM Wayne Negrete MD [facility] [place]   LOV 9-10-24 SM    Rosuvastatin 10 mg  Filled 8-2-24  Qty 90  0 refills  Future Appointments   Date Time Provider Department Center   11/14/2024 10:00 AM HND [facility] RM1 [facility]   11/20/2024 10:00 AM Wayne Negrete MD [facility]   LOV 9-10-24 SM

## 2024-11-12 ENCOUNTER — PATIENT MESSAGE (OUTPATIENT)
Dept: INTERNAL MEDICINE CLINIC | Facility: CLINIC | Age: 59
End: 2024-11-12

## 2024-11-19 ENCOUNTER — TELEPHONE (OUTPATIENT)
Dept: INTERNAL MEDICINE CLINIC | Facility: CLINIC | Age: 59
End: 2024-11-19

## 2024-11-20 ENCOUNTER — OFFICE VISIT (OUTPATIENT)
Dept: RHEUMATOLOGY | Facility: CLINIC | Age: 59
End: 2024-11-20
Payer: MEDICARE

## 2024-11-20 VITALS
BODY MASS INDEX: 34.01 KG/M2 | SYSTOLIC BLOOD PRESSURE: 116 MMHG | TEMPERATURE: 98 F | DIASTOLIC BLOOD PRESSURE: 80 MMHG | HEIGHT: 64 IN | OXYGEN SATURATION: 98 % | HEART RATE: 108 BPM | RESPIRATION RATE: 18 BRPM | WEIGHT: 199.19 LBS

## 2024-11-20 DIAGNOSIS — Z96.642 HISTORY OF TOTAL LEFT HIP REPLACEMENT: Primary | ICD-10-CM

## 2024-11-20 DIAGNOSIS — M47.896 OTHER OSTEOARTHRITIS OF SPINE, LUMBAR REGION: ICD-10-CM

## 2024-11-20 DIAGNOSIS — M17.0 PRIMARY OSTEOARTHRITIS OF BOTH KNEES: ICD-10-CM

## 2024-11-20 DIAGNOSIS — M16.11 PRIMARY OSTEOARTHRITIS OF ONE HIP, RIGHT: ICD-10-CM

## 2024-11-20 PROCEDURE — 3074F SYST BP LT 130 MM HG: CPT | Performed by: INTERNAL MEDICINE

## 2024-11-20 PROCEDURE — 99214 OFFICE O/P EST MOD 30 MIN: CPT | Performed by: INTERNAL MEDICINE

## 2024-11-20 PROCEDURE — 3008F BODY MASS INDEX DOCD: CPT | Performed by: INTERNAL MEDICINE

## 2024-11-20 PROCEDURE — 3079F DIAST BP 80-89 MM HG: CPT | Performed by: INTERNAL MEDICINE

## 2024-11-20 RX ORDER — HYDROCODONE BITARTRATE AND ACETAMINOPHEN 10; 325 MG/1; MG/1
1 TABLET ORAL EVERY 4 HOURS PRN
Qty: 180 TABLET | Refills: 0 | Status: SHIPPED | OUTPATIENT
Start: 2025-01-08

## 2024-11-20 RX ORDER — HYDROCODONE BITARTRATE AND ACETAMINOPHEN 10; 325 MG/1; MG/1
1 TABLET ORAL EVERY 4 HOURS PRN
Qty: 180 TABLET | Refills: 0 | Status: SHIPPED | OUTPATIENT
Start: 2025-02-07

## 2024-11-20 RX ORDER — HYDROCODONE BITARTRATE AND ACETAMINOPHEN 10; 325 MG/1; MG/1
1 TABLET ORAL EVERY 4 HOURS PRN
Qty: 180 TABLET | Refills: 0 | Status: SHIPPED | OUTPATIENT
Start: 2024-12-09

## 2024-11-20 NOTE — PROGRESS NOTES
[facility]  Dr. Negrete Progress Note     Subjective:   Lizbeth Berkowitz is a(n) 59 year old female.   Current complaints:   Chief Complaint   Patient presents with    Follow - Up     LOV:08/20/24 Pain in left shoulder is better. Fell Friday, fell forward. Did not go to ER. Complain of pain to left knee. Walks with cane.Takes care of . Plans on seeing ortho. For a possible knee replacement.    Chronic osteoarthritis left knee and right knee.  .  S/P L THR.   Dr. Candelario Godinez of [facility].  On Norco for pain 10 mg every 4 hours as needed.    3 weeks post op from left knee replacement surgery.    Fell Friday, tripped , fell forward, landed on left knee.  Left knee hurts and has underlying arthritis.  Use Norco 10 mg 1 every 4 hours.  Uses cyclobenzaprine for muscle spasm.  Uses xanax for stress.   Objective:   /80 (BP Location: Right arm, Patient Position: Sitting, Cuff Size: adult)   Pulse 108   Temp 98 °F (36.7 °C) (Temporal)   Resp 18   Ht 5' 4\" (1.626 m)   Wt 199 lb 3.2 oz (90.4 kg)   LMP 11/06/2015   SpO2 98%   BMI 34.19 kg/m²   Lungs clear  Heart nsr    Left knee tender, hypertrophic, trace sweollen  Right knee tender hypertrophic  11/7/23  xray left knee - severe osteoarthritis done at [facility].  Assessment:     Encounter Diagnoses   Name Primary?    Primary osteoarthritis of both knees     Other osteoarthritis of spine, lumbar region     Primary osteoarthritis of one hip, right     History of total left hip replacement Yes     Plan:     Patient Instructions   For chronic arthritis pain, use Norco 10 mg, 1 every 4 hours as needed.  For muscle spasm take cyclobenzaprine 10 mg as needed.  Apply over-the-counter arthritis cream to your left knee and right knee 2-3 times a day.  Okay to apply ice to the left knee to bring down swelling.  See Dr. Candelario Godinez regarding left knee replacement surgery.  Return to office in 3 check 3 months.        Wayne Negrete MD 11/20/2024  10:06 AM

## 2024-11-20 NOTE — PATIENT INSTRUCTIONS
For chronic arthritis pain, use Norco 10 mg, 1 every 4 hours as needed.  For muscle spasm take cyclobenzaprine 10 mg as needed.  Apply over-the-counter arthritis cream to your left knee and right knee 2-3 times a day.  Okay to apply ice to the left knee to bring down swelling.  See Dr. Candelario Godinez regarding left knee replacement surgery.  Return to office in 3 check 3 months.

## 2024-11-22 DIAGNOSIS — F41.1 ANXIETY STATE: ICD-10-CM

## 2024-11-23 RX ORDER — ALPRAZOLAM 0.5 MG
0.5 TABLET ORAL 3 TIMES DAILY PRN
Qty: 40 TABLET | Refills: 0 | Status: SHIPPED | OUTPATIENT
Start: 2024-11-23

## 2024-12-04 ENCOUNTER — HOSPITAL ENCOUNTER (OUTPATIENT)
Dept: MAMMOGRAPHY | Age: 59
Discharge: HOME | End: 2024-12-04
Attending: FAMILY MEDICINE
Payer: MEDICARE

## 2024-12-04 DIAGNOSIS — Z12.31 ENCOUNTER FOR SCREENING MAMMOGRAM FOR MALIGNANT NEOPLASM OF BREAST: ICD-10-CM

## 2024-12-04 PROCEDURE — 77067 SCR MAMMO BI INCL CAD: CPT | Performed by: FAMILY MEDICINE

## 2024-12-04 PROCEDURE — 77063 BREAST TOMOSYNTHESIS BI: CPT | Performed by: FAMILY MEDICINE

## 2024-12-11 DIAGNOSIS — F41.1 ANXIETY STATE: ICD-10-CM

## 2024-12-12 RX ORDER — ALPRAZOLAM 0.5 MG
0.5 TABLET ORAL 3 TIMES DAILY PRN
Qty: 40 TABLET | Refills: 0 | Status: SHIPPED | OUTPATIENT
Start: 2024-12-12

## 2024-12-12 NOTE — TELEPHONE ENCOUNTER
Alprazolam 0.5 mg  Filled 11-23-24  Qty 40  0 refills  Future Appointments   Date Time Provider Department Center   2/19/2025 10:00 AM Wayne Negrete MD [facility] [place]   5/19/2025 11:00 AM Wayne Negrete MD [facility] 9-10-24

## 2024-12-28 DIAGNOSIS — F41.1 ANXIETY STATE: ICD-10-CM

## 2024-12-29 RX ORDER — ALPRAZOLAM 0.5 MG
0.5 TABLET ORAL 3 TIMES DAILY PRN
Qty: 40 TABLET | Refills: 0 | OUTPATIENT
Start: 2024-12-29

## 2024-12-30 ENCOUNTER — PATIENT MESSAGE (OUTPATIENT)
Dept: INTERNAL MEDICINE CLINIC | Facility: CLINIC | Age: 59
End: 2024-12-30

## 2024-12-30 DIAGNOSIS — F41.1 ANXIETY STATE: ICD-10-CM

## 2024-12-31 RX ORDER — ALPRAZOLAM 0.5 MG
0.5 TABLET ORAL 3 TIMES DAILY PRN
Qty: 40 TABLET | Refills: 0
Start: 2024-12-31

## 2024-12-31 NOTE — TELEPHONE ENCOUNTER
Pt is requesting refill before the end of the year. Has upcoming appt. With you.    Alprazolam 0.5 mg  Filled 12-12-24  Qty 40  0 refills  Future Appointments   Date Time Provider Department Center   1/9/2025 10:15 AM Meera Navarrete APRN EMG 8 [facility] [place]   2/19/2025 10:00 AM Wayne Negrete MD [facility] [place]   5/19/2025 11:00 AM Wayne Negrete MD [facility] 9-10-24

## 2025-01-07 ENCOUNTER — PATIENT MESSAGE (OUTPATIENT)
Dept: INTERNAL MEDICINE CLINIC | Facility: CLINIC | Age: 60
End: 2025-01-07

## 2025-01-07 DIAGNOSIS — F41.1 ANXIETY STATE: ICD-10-CM

## 2025-01-07 RX ORDER — ALPRAZOLAM 0.5 MG
0.5 TABLET ORAL 3 TIMES DAILY PRN
Qty: 40 TABLET | Refills: 0 | Status: SHIPPED | OUTPATIENT
Start: 2025-01-07

## 2025-01-07 NOTE — TELEPHONE ENCOUNTER
Alprazolam 0.5 mg  Filled 12-12-24  Qty 40  0 refills  Future Appointments   Date Time Provider Department Center   1/9/2025 10:15 AM Meera Navarrete APRN [facility] 8 [facility]   2/19/2025 10:00 AM Wayne Negrete MD [facility]   5/19/2025 11:00 AM Wayne Negrete MD [facility] 9-10-24

## 2025-01-09 ENCOUNTER — OFFICE VISIT (OUTPATIENT)
Dept: INTERNAL MEDICINE CLINIC | Facility: CLINIC | Age: 60
End: 2025-01-09
Payer: MEDICARE

## 2025-01-09 VITALS
RESPIRATION RATE: 14 BRPM | BODY MASS INDEX: 32.84 KG/M2 | OXYGEN SATURATION: 99 % | WEIGHT: 192.38 LBS | HEIGHT: 64 IN | TEMPERATURE: 98 F | HEART RATE: 98 BPM | SYSTOLIC BLOOD PRESSURE: 128 MMHG | DIASTOLIC BLOOD PRESSURE: 78 MMHG

## 2025-01-09 DIAGNOSIS — Z00.00 ENCOUNTER FOR ANNUAL HEALTH EXAMINATION: Primary | ICD-10-CM

## 2025-01-09 DIAGNOSIS — E11.65 TYPE 2 DIABETES MELLITUS WITH HYPERGLYCEMIA, WITHOUT LONG-TERM CURRENT USE OF INSULIN (HCC): Chronic | ICD-10-CM

## 2025-01-09 DIAGNOSIS — M16.12 PRIMARY OSTEOARTHRITIS OF LEFT HIP: ICD-10-CM

## 2025-01-09 DIAGNOSIS — I15.2 HYPERTENSION ASSOCIATED WITH TYPE 2 DIABETES MELLITUS (HCC): ICD-10-CM

## 2025-01-09 DIAGNOSIS — E78.00 PURE HYPERCHOLESTEROLEMIA: ICD-10-CM

## 2025-01-09 DIAGNOSIS — R25.2 CRAMPS, EXTREMITY: ICD-10-CM

## 2025-01-09 DIAGNOSIS — E55.9 VITAMIN D DEFICIENCY: ICD-10-CM

## 2025-01-09 DIAGNOSIS — E11.9 TYPE 2 DIABETES MELLITUS WITHOUT COMPLICATION, WITHOUT LONG-TERM CURRENT USE OF INSULIN (HCC): ICD-10-CM

## 2025-01-09 DIAGNOSIS — M48.02 STENOSIS OF CERVICAL SPINE: ICD-10-CM

## 2025-01-09 DIAGNOSIS — N18.31 STAGE 3A CHRONIC KIDNEY DISEASE (HCC): Chronic | ICD-10-CM

## 2025-01-09 DIAGNOSIS — E11.59 HYPERTENSION ASSOCIATED WITH TYPE 2 DIABETES MELLITUS (HCC): ICD-10-CM

## 2025-01-09 DIAGNOSIS — Z87.442 HISTORY OF KIDNEY STONES: ICD-10-CM

## 2025-01-09 DIAGNOSIS — R11.0 NAUSEA: Primary | ICD-10-CM

## 2025-01-09 DIAGNOSIS — Z96.642 HISTORY OF TOTAL LEFT HIP REPLACEMENT: ICD-10-CM

## 2025-01-09 DIAGNOSIS — M17.0 PRIMARY OSTEOARTHRITIS OF BOTH KNEES: ICD-10-CM

## 2025-01-09 DIAGNOSIS — H90.3 SENSORINEURAL HEARING LOSS (SNHL) OF BOTH EARS: ICD-10-CM

## 2025-01-09 DIAGNOSIS — G43.019 INTRACTABLE MIGRAINE WITHOUT AURA AND WITHOUT STATUS MIGRAINOSUS: ICD-10-CM

## 2025-01-09 DIAGNOSIS — I10 ESSENTIAL HYPERTENSION, BENIGN: ICD-10-CM

## 2025-01-09 DIAGNOSIS — E66.811 OBESITY (BMI 30.0-34.9): ICD-10-CM

## 2025-01-09 DIAGNOSIS — D50.9 IRON DEFICIENCY ANEMIA, UNSPECIFIED IRON DEFICIENCY ANEMIA TYPE: ICD-10-CM

## 2025-01-09 DIAGNOSIS — F41.1 ANXIETY STATE: ICD-10-CM

## 2025-01-09 DIAGNOSIS — F17.200 CURRENT SMOKER: ICD-10-CM

## 2025-01-09 DIAGNOSIS — J41.0 SMOKERS' COUGH (HCC): Chronic | ICD-10-CM

## 2025-01-09 DIAGNOSIS — F33.0 MILD RECURRENT MAJOR DEPRESSION (HCC): Chronic | ICD-10-CM

## 2025-01-09 DIAGNOSIS — K21.9 GASTROESOPHAGEAL REFLUX DISEASE WITHOUT ESOPHAGITIS: ICD-10-CM

## 2025-01-09 DIAGNOSIS — M47.896 OTHER OSTEOARTHRITIS OF SPINE, LUMBAR REGION: ICD-10-CM

## 2025-01-09 DIAGNOSIS — M19.041 PRIMARY OSTEOARTHRITIS OF BOTH HANDS: ICD-10-CM

## 2025-01-09 DIAGNOSIS — M19.042 PRIMARY OSTEOARTHRITIS OF BOTH HANDS: ICD-10-CM

## 2025-01-09 DIAGNOSIS — M16.11 PRIMARY OSTEOARTHRITIS OF RIGHT HIP: ICD-10-CM

## 2025-01-09 RX ORDER — GLIMEPIRIDE 4 MG/1
4 TABLET ORAL
Qty: 90 TABLET | Refills: 0 | Status: SHIPPED | OUTPATIENT
Start: 2025-01-09

## 2025-01-09 RX ORDER — HYDROCHLOROTHIAZIDE 25 MG/1
25 TABLET ORAL DAILY
Qty: 90 TABLET | Refills: 0 | Status: SHIPPED | OUTPATIENT
Start: 2025-01-09

## 2025-01-09 RX ORDER — ONDANSETRON 4 MG/1
4 TABLET, ORALLY DISINTEGRATING ORAL EVERY 8 HOURS PRN
Qty: 60 TABLET | Refills: 1 | Status: SHIPPED | OUTPATIENT
Start: 2025-01-09

## 2025-01-09 RX ORDER — ROSUVASTATIN CALCIUM 10 MG/1
10 TABLET, COATED ORAL NIGHTLY
Qty: 90 TABLET | Refills: 0 | Status: SHIPPED | OUTPATIENT
Start: 2025-01-09

## 2025-01-09 RX ORDER — LOSARTAN POTASSIUM 100 MG/1
100 TABLET ORAL DAILY
Qty: 90 TABLET | Refills: 0 | Status: SHIPPED | OUTPATIENT
Start: 2025-01-09

## 2025-01-09 NOTE — TELEPHONE ENCOUNTER
Last office visit: 11/20/24    Next Rheum Apt:2/19/2025 Wayne Negrete MD    Last fill: 9/16/24    Labs:   Lab Results   Component Value Date    CREATSERUM 1.24 (H) 08/23/2024    GFR 59 (L) 09/05/2014    ALKPHO 59 08/23/2024    AST 24 08/23/2024    ALT 11 08/23/2024    BILT 0.3 08/23/2024    TP 7.6 08/23/2024    ALB 4.7 08/23/2024       Lab Results   Component Value Date    WBC 7.2 03/05/2024    HGB 13.0 03/05/2024    .0 03/05/2024    NEPRELIM 4.16 03/05/2024    NEPERCENT 57.6 03/05/2024    LYPERCENT 27.0 03/05/2024    NE 4.16 03/05/2024    LYMABS 1.95 03/05/2024

## 2025-01-09 NOTE — PROGRESS NOTES
Subjective:   Lizbeth Berkowitz is a 59 year old female who presents for a MA AHA (Medicare Advantage Annual Health Assessment) and Subsequent Annual Wellness visit (Pt already had Initial Annual Wellness) and scheduled follow up of multiple significant but stable problems.     Lizbeth Berkowitz is a 58 year old female who presents for recheck of her diabetes.   Patient’ does not check her blood sugars  Last visit with ophthalmologist- Pt has appt next thursday.    Pt has been checking her feet on a regular basis. Pt has some nunbness of the feet.      Patient presents for recheck of their hypertension.hyperlipidemia. Pt has been taking medications as instructed, no medication side effects, home BP monitoring has not been done.  Currently asymptomatic, no chest pains, jaw pains, arm pains. No headaches, dizziness or edema.  No SOB on exertion or rest.  Pt has been trying to follow a low fat diet and has not been exercising but does try to walk more and goes up and down stairs all day    History/Other:   Fall Risk Assessment:   She has been screened for Falls and is High Risk. Fall Prevention information provided to patient in After Visit Summary.    Do you feel unsteady when standing or walking?: Yes  Do you worry about falling?: No  Have you fallen in the past year?: Yes  How many times have you fallen?: 2  Were you injured?: No     Cognitive Assessment:   She had a completely normal cognitive assessment - see flowsheet entries     Functional Ability/Status:   Lizbeth Berkowitz has some abnormal functions as listed below:  She has Dressing and/or Bathing issues based on screening of functional status.  Difficulty dressing or bathing?: Yes  Bathing or Showering: Able without help  Dressing: Need some help  She has Meal Preparation difficulties based on screening of functional status.She has difficulties Shopping for Groceries based on screening of functional status. She has Hearing problems based on screening of  functional status.She has Vision problems based on screening of functional status. She has Walking problems based on screening of functional status. She has problems with Daily Activities based on screening of functional status.       Depression Screening (PHQ):  PHQ-2 SCORE: 0  , done 1/9/2025   Last [place] Suicide Screening on 1/9/2025 was No Risk.     <5 minutes spent screening and counseling for depression    Advanced Directives:   She does NOT have a Living Will. [Do you have a living will?: No]  She does NOT have a Power of  for Health Care. [Do you have a healthcare power of ?: No]  Discussed Advance Care Planning with patient (and family/surrogate if present). Standard forms made available to patient in After Visit Summary.      Patient Active Problem List   Diagnosis    Pure hypercholesterolemia    Anemia, unspecified    Anxiety state    Esophageal reflux    Diabetes mellitus (HCC)    Essential hypertension, benign    Migraine without aura    Primary osteoarthritis of both knees    Cramps, extremity    Osteoarthritis of lumbar spine    History of kidney stones    Current smoker    Sensorineural hearing loss (SNHL) of both ears    Mild recurrent major depression ([facility])    CKD (chronic kidney disease) stage 3, GFR 30-59 ml/min ([facility])    Smokers' cough ([facility])    Primary osteoarthritis of left hip    History of total left hip replacement    Primary osteoarthritis of right hip    Type 2 diabetes mellitus with hyperglycemia, without long-term current use of insulin ([facility])    Stenosis of cervical spine    Primary osteoarthritis of both hands    Other specified anemias    Obesity (BMI 30.0-34.9)    Vitamin D deficiency     Allergies:  She is allergic to etodolac and diclofenac.    Current Medications:  Outpatient Medications Marked as Taking for the 1/9/25 encounter (Office Visit) with Meera Navarrete APRN   Medication Sig    glimepiride 4 MG Oral Tab Take 1 tablet (4 mg total) by mouth before  breakfast.    hydroCHLOROthiazide 25 MG Oral Tab Take 1 tablet (25 mg total) by mouth daily.    losartan 100 MG Oral Tab Take 1 tablet (100 mg total) by mouth daily.    metFORMIN HCl 1000 MG Oral Tab Take 1 tablet (1,000 mg total) by mouth 2 (two) times daily with meals.    rosuvastatin 10 MG Oral Tab Take 1 tablet (10 mg total) by mouth nightly.    ALPRAZolam 0.5 MG Oral Tab Take 1 tablet (0.5 mg total) by mouth 3 (three) times daily as needed.    HYDROcodone-acetaminophen (NORCO)  MG Oral Tab Take 1 tablet by mouth every 4 (four) hours as needed for Pain.    PARoxetine HCl 40 MG Oral Tab Take 1 tablet (40 mg total) by mouth every morning.    ondansetron 4 MG Oral Tablet Dispersible Place 1 tablet (4 mg total) under the tongue every 8 (eight) hours as needed for Nausea.    Cholecalciferol (VITAMIN D) 50 MCG (2000 UT) Oral Cap Take 1 capsule (2,000 Units total) by mouth daily.    Esomeprazole Magnesium 20 MG Oral Powd Pack Take 20 mg by mouth daily.       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  Surgical History:  She  has a past surgical history that includes other surgical history; d & c; femur/knee surg unlisted; tubal ligation;  (); cholecystectomy; other; upper gi endoscopy,exam; and hip replacement surgery (Left).   Family History:  Her family history includes Arthritis in her father; Blood Disorder in her mother; Breast Cancer (age of onset: 37) in her mother; Heart Disorder in her maternal grandfather and maternal grandmother; Pulmonary Disease in her father.  Social History:  She  reports that she  has been smoking cigarettes. She has never used smokeless tobacco. She reports that she does not drink alcohol and does not use drugs.    Tobacco:  Social History     Tobacco Use   Smoking Status Every Day    Current packs/day: 0.50    Types: Cigarettes   Smokeless Tobacco Never   Tobacco Comments    Smokes 4 cigarettes daily     E-Cigarettes/Vaping       Questions Responses    E-Cigarette Use Never User           Tobacco cessation counseling for <3 minutes. Pt smokes 4 cigarettes a day. Pt states she has cut down a lot but can't totally give it up. Pt states she is an anxious person.      CAGE Alcohol Screen:   CAGE screening score of 0 on 1/9/2025, showing low risk of alcohol abuse.      Patient Care Team:  Elaine Gandhi MD as PCP - General (Family Practice)  Parker Nunn MD (OBSTETRICS & GYNECOLOGY)  Wayne Negrete MD (RHEUMATOLOGY)    Review of Systems  GENERAL: feels well otherwise  SKIN: denies any unusual skin lesions  EYES: denies blurred vision or double vision  HEENT: denies nasal congestion, sinus pain or ST  LUNGS: denies shortness of breath with exertion,+smoker  CARDIOVASCULAR: denies chest pain on exertion,+HTN  GI: denies abdominal pain, +GERD  : denies dysuria, vaginal discharge or itching, no complaint of urinary incontinence   MUSCULOSKELETAL: hx back pain,+OA  NEURO: + migraine headaches  PSYCHE:+ depression and  anxiety  HEMATOLOGIC: hx of anemia  ENDOCRINE: denies thyroid history,+DM  ALL/ASTHMA: denies hx of allergy or asthma    Objective:   Physical Exam  General Appearance:  Alert, cooperative, no distress, appears stated age   Head:  Normocephalic, without obvious abnormality, atraumatic   Eyes:  PERRL, conjunctiva/corneas clear, EOM's intact both eyes   Ears:  Normal TM's and external ear canals, both ears   Nose: Nares normal, septum midline,mucosa normal, no drainage or sinus tenderness   Throat: Lips, mucosa, and tongue normal; teeth and gums normal   Neck: Supple, symmetrical,  trachea midline, no adenopathy;  thyroid: not enlarged, symmetric, no tenderness/mass/nodules; no carotid bruit or JVD   Back:   Symmetric, no curvature, ROM normal, no CVA tenderness   Lungs:   Clear to auscultation bilaterally, respirations unlabored   Heart:  Regular rate and rhythm, S1 and S2 normal, no murmur, rub, or gallop   Abdomen:   Soft, non-tender, bowel sounds active all four quadrants,  no masses, no organomegaly   Pelvic: Deferred   Extremities: Extremities normal, atraumatic, no cyanosis or edema  Bilateral barefoot skin diabetic exam is normal, visualized feet and the appearance is normal.  Bilateral monofilament/sensation of both feet is normal.  Pulsation pedal pulse exam of both lower legs/feet is normal as well.   Pulses: 2+ and symmetric   Skin: Skin color, texture, turgor normal, no rashes or lesions   Lymph nodes: Cervical, supraclavicular, and axillary nodes normal   Neurologic: Normal       /78 (BP Location: Left arm, Patient Position: Sitting, Cuff Size: adult)   Pulse 98   Temp 97.7 °F (36.5 °C) (Temporal)   Resp 14   Ht 5' 4\" (1.626 m)   Wt 192 lb 6.4 oz (87.3 kg)   LMP 11/06/2015   SpO2 99%   BMI 33.03 kg/m²  Estimated body mass index is 33.03 kg/m² as calculated from the following:    Height as of this encounter: 5' 4\" (1.626 m).    Weight as of this encounter: 192 lb 6.4 oz (87.3 kg).    Medicare Hearing Assessment:   Hearing Screening    Time taken: 1/9/2025 10:38 AM  Entry User: Hilda Soriano CMA  Screening Method: Finger Rub  Finger Rub Result: Pass (Comment: could not hear on L ear)         Visual Acuity:   Right Eye Visual Acuity: Corrected Right Eye Chart Acuity: 20/70   Left Eye Visual Acuity: Corrected Left Eye Chart Acuity: 20/70   Both Eyes Visual Acuity: Corrected Both Eyes Chart Acuity: 20/50   Able To Tolerate Visual Acuity: Yes        Assessment & Plan:   Lizbeth Berkowitz is a 59 year old female who presents for a Medicare Assessment.     1. Encounter for  annual health examination (Primary)  2. Type 2 diabetes mellitus without complication, without long-term current use of insulin ([facility])  -     Glimepiride; Take 1 tablet (4 mg total) by mouth before breakfast.  Dispense: 90 tablet; Refill: 0  -     metFORMIN HCl; Take 1 tablet (1,000 mg total) by mouth 2 (two) times daily with meals.  Dispense: 180 tablet; Refill: 0  -     Comp Metabolic Panel (14); Future; Expected date: 01/09/2025  -     Hemoglobin A1C; Future; Expected date: 01/09/2025  -     Microalb/Creat Ratio, Random Urine; Future; Expected date: 01/09/2025  -     TSH W Reflex To Free T4; Future; Expected date: 01/09/2025  -     Expanded, Low Complexity (38686)  3. Hypertension associated with type 2 diabetes mellitus ([facility])  -     hydroCHLOROthiazide; Take 1 tablet (25 mg total) by mouth daily.  Dispense: 90 tablet; Refill: 0  -     Losartan Potassium; Take 1 tablet (100 mg total) by mouth daily.  Dispense: 90 tablet; Refill: 0  -     Expanded, Low Complexity (10326)  4. Pure hypercholesterolemia  -     Rosuvastatin Calcium; Take 1 tablet (10 mg total) by mouth nightly.  Dispense: 90 tablet; Refill: 0  -     Comp Metabolic Panel (14); Future; Expected date: 01/09/2025  -     Lipid Panel; Future; Expected date: 01/09/2025  -     Expanded, Low Complexity (08037)  5. Vitamin D deficiency  -     Vitamin D; Future; Expected date: 01/09/2025  6. Iron deficiency anemia, unspecified iron deficiency anemia type  -     CBC With Differential With Platelet; Future; Expected date: 01/09/2025  7. Smokers' cough ([facility])  8. Obesity (BMI 30.0-34.9)  9. Anxiety state  10. Primary osteoarthritis of right hip  11. Current smoker  12. Mild recurrent major depression ([facility])  13. Intractable migraine without aura and without status migrainosus  14. Gastroesophageal reflux disease without esophagitis  15. History of total left hip replacement  Overview:  9/29/21  Dr. Candelario Barrios  16. Stage 3a chronic kidney disease ([facility])  17.  Essential hypertension, benign  18. Other osteoarthritis of spine, lumbar region  19. Primary osteoarthritis of both hands  20. Primary osteoarthritis of both knees  21. Sensorineural hearing loss (SNHL) of both ears  22. Type 2 diabetes mellitus with hyperglycemia, without long-term current use of insulin (HCC)  23. Stenosis of cervical spine  24. Primary osteoarthritis of left hip  25. History of kidney stones  26. Cramps, extremity    PLAN;  1. Type 2 diabetes mellitus without complication, without long-term current use of insulin (HCC)  Continue present medications. Check sugars daily or as directed.  Eat a low fat, low carb, low cholesterol diet. Call with sugars less than 70 or greater than 300.   Fasting labs due  Please see your specialists as recommended.  Keep up with regular eye exams and Check feet daily. Stay on your low salt, diabetic diet.  Stay as active as you can.  See me back as scheduled, every 6 months  - glimepiride 4 MG Oral Tab; Take 1 tablet (4 mg total) by mouth before breakfast.  Dispense: 90 tablet; Refill: 0  - metFORMIN HCl 1000 MG Oral Tab; Take 1 tablet (1,000 mg total) by mouth 2 (two) times daily with meals.  Dispense: 180 tablet; Refill: 0  - Comp Metabolic Panel (14); Future  - Hemoglobin A1C; Future  - Microalb/Creat Ratio, Random Urine; Future  - TSH W Reflex To Free T4; Future  - Expanded, Low Complexity (75143)    2. Hypertension associated with type 2 diabetes mellitus (HCC)  Conservative measures dicussed. Continue medication.  Diet and exercise explained and encouraged.  Home blood pressure monitoring. Pt should measure BP’s two to three times per week. Goal blood pressure at home - < 135/85.   - hydroCHLOROthiazide 25 MG Oral Tab; Take 1 tablet (25 mg total) by mouth daily.  Dispense: 90 tablet; Refill: 0  - losartan 100 MG Oral Tab; Take 1 tablet (100 mg total) by mouth daily.  Dispense: 90 tablet; Refill: 0  - Expanded, Low Complexity (11407)    3. Pure  hypercholesterolemia  Pt to continue their medication, increase exercise,  choose better fats,  increase fiber and avoid processed foods.  - rosuvastatin 10 MG Oral Tab; Take 1 tablet (10 mg total) by mouth nightly.  Dispense: 90 tablet; Refill: 0  - Comp Metabolic Panel (14); Future  - Lipid Panel; Future  - Expanded, Low Complexity (71277)    4. Vitamin D deficiency  Check lab  - Vitamin D; Future    5. Iron deficiency anemia, unspecified iron deficiency anemia type  Check lab  - CBC With Differential With Platelet; Future    6. Smokers' cough (HCC)  Pt is trying to stop smoking. Pt is down to 4 cigarettes a day.    7. Obesity (BMI 30.0-34.9)  Pt to work on eating less, plenty of vegetables. Pt to try and stay around 1200 calories per da     8. Anxiety state  Stable, continue medication    9. Primary osteoarthritis of right hip  -managed by Dr. Barrios and Dr. Negrete     10. Current smoker  Discussed stop smoking with Pt.  Pt is not ready to totally stop due to her anxiety     11. Mild recurrent major depression (HCC)  Stable, continue medication    12. Intractable migraine without aura and without status migrainosus  Stable, continue medication as needed    13. Gastroesophageal reflux disease without esophagitis  Otc Nexium as needed. Avoids trigger foods    14. History of total left hip replacement  -managed by Dr. Barrios and Dr. Negrete     15. Stage 3a chronic kidney disease (HCC)  Pt to keep HTN and DM under control to help protect her kidneys. Recheck CMP     16. Essential hypertension, benign  Conservative measures dicussed. Continue medication.  Diet and exercise explained and encouraged.  Home blood pressure monitoring. Pt should measure BP’s two to three times per week. Goal blood pressure at home - < 135/85.       17. Other osteoarthritis of spine, lumbar region  -managed by Dr. Barrios and Dr. Negrete     18. Primary osteoarthritis of both hands  -managed by Dr. Barrios and Dr. Negrete     19.  Primary osteoarthritis of both knees  -managed by Dr. Barrios and Dr. Negrete     20. Sensorineural hearing loss (SNHL) of both ears  Managed by Dr. Bedoya     21. Type 2 diabetes mellitus with hyperglycemia, without long-term current use of insulin (HCC)  See #1    22. Stenosis of cervical spine  Stable, continue pain medication as needed    23. Primary osteoarthritis of left hip  -managed by Dr. Barrios and Dr. Negrete     24. History of kidney stones  No current issues    25. Cramps, extremity    - drink plenty of fluids  -stretch your legs before bedtime  - use heat/ice, and massage  - eat a diet high in potassium- fruit like Bananas,oranges,melon,baked potatoes,salmon  - avoid stimulants like caffeine, nicotine and decongestants    26. Encounter for annual health examination  Stable, adult exam, Pt to f/u in one year for her annual exam.    The patient indicates understanding of these issues and agrees to the plan.  Continue with current treatment plan.  Lab work ordered.  Prescription medication ordered.  Reinforced healthy diet, lifestyle, and exercise.  Follow up in 6 months.    Return in 6 months (on 7/9/2025).     HEAVENLY Sy, 1/9/2025     Supplementary Documentation:   General Health:  In the past six months, have you lost more than 10 pounds without trying?: 2 - No  Has your appetite been poor?: Yes  Type of Diet: Low Salt  How does the patient maintain a good energy level?: Other  How would you describe your daily physical activity?: Light  How would you describe your current health state?: Poor  How do you maintain positive mental well-being?: Social Interaction;Puzzles;Games;Visiting Friends;Visiting Family  On a scale of 0 to 10, with 0 being no pain and 10 being severe pain, what is your pain level?: 0 - (None)  In the past six months, have you experienced urine leakage?: 0-No  At any time do you feel concerned for the safety/well-being of yourself and/or your children, in your home or  elsewhere?: No  Have you had any immunizations at another office such as Influenza, Hepatitis B, Tetanus, or Pneumococcal?: No    Health Maintenance   Topic Date Due    Zoster Vaccines (1 of 2) Never done    Colorectal Cancer Screening  10/26/2024    Annual Well Visit  01/01/2025    Annual Depression Screening  01/01/2025    Diabetes Care: Foot Exam (Annual)  01/01/2025    Tobacco Cessation Counseling  01/01/2025    Diabetes Care: Microalb/Creat Ratio (Annual)  01/01/2025    Diabetes Care A1C  02/23/2025    Diabetes Care: GFR  08/23/2025    Diabetes Care Dilated Eye Exam  11/18/2025    Mammogram  12/04/2025    Pap Smear  03/07/2026    DTaP,Tdap,and Td Vaccines (2 - Td or Tdap) 10/28/2033    Influenza Vaccine  Completed    Pneumococcal Vaccine: 50+ Years  Completed    COVID-19 Vaccine  Completed    Meningococcal B Vaccine  Aged Out

## 2025-02-05 DIAGNOSIS — F41.1 ANXIETY STATE: ICD-10-CM

## 2025-02-06 RX ORDER — ALPRAZOLAM 0.5 MG
0.5 TABLET ORAL 3 TIMES DAILY PRN
Qty: 40 TABLET | Refills: 0 | Status: SHIPPED | OUTPATIENT
Start: 2025-02-06

## 2025-02-06 NOTE — TELEPHONE ENCOUNTER
Alprazolam 0.5 mg  Filled 1-7-25  Qty 40  0 refills  Future Appointments   Date Time Provider Department Center   2/11/2025  9:15 AM REF [facility] SCHEDULED [facility]OURCE [facility]   2/19/2025 10:00 AM Wayne Negrete MD [facility] [place]   5/19/2025 11:00 AM Wayne Negrete MD [facility] 1-9-25

## 2025-02-18 ENCOUNTER — LAB ENCOUNTER (OUTPATIENT)
Dept: LAB | Facility: REFERENCE LAB | Age: 60
End: 2025-02-18
Attending: FAMILY MEDICINE
Payer: MEDICARE

## 2025-02-18 DIAGNOSIS — D50.9 IRON DEFICIENCY ANEMIA, UNSPECIFIED IRON DEFICIENCY ANEMIA TYPE: ICD-10-CM

## 2025-02-18 DIAGNOSIS — E55.9 VITAMIN D DEFICIENCY: ICD-10-CM

## 2025-02-18 DIAGNOSIS — E78.00 PURE HYPERCHOLESTEROLEMIA: ICD-10-CM

## 2025-02-18 DIAGNOSIS — E11.9 TYPE 2 DIABETES MELLITUS WITHOUT COMPLICATION, WITHOUT LONG-TERM CURRENT USE OF INSULIN (HCC): ICD-10-CM

## 2025-02-18 LAB
ALBUMIN SERPL-MCNC: 4.5 G/DL (ref 3.2–4.8)
ALBUMIN/GLOB SERPL: 1.7 {RATIO} (ref 1–2)
ALP LIVER SERPL-CCNC: 61 U/L
ALT SERPL-CCNC: 11 U/L
ANION GAP SERPL CALC-SCNC: 11 MMOL/L (ref 0–18)
AST SERPL-CCNC: 14 U/L (ref ?–34)
BASOPHILS # BLD AUTO: 0.07 X10(3) UL (ref 0–0.2)
BASOPHILS NFR BLD AUTO: 1.1 %
BILIRUB SERPL-MCNC: 0.3 MG/DL (ref 0.2–1.1)
BUN BLD-MCNC: 21 MG/DL (ref 9–23)
BUN/CREAT SERPL: 15.2 (ref 10–20)
CALCIUM BLD-MCNC: 9.8 MG/DL (ref 8.7–10.4)
CHLORIDE SERPL-SCNC: 99 MMOL/L (ref 98–112)
CHOLEST SERPL-MCNC: 130 MG/DL (ref ?–200)
CO2 SERPL-SCNC: 30 MMOL/L (ref 21–32)
CREAT BLD-MCNC: 1.38 MG/DL
CREAT UR-SCNC: 74.7 MG/DL
DEPRECATED RDW RBC AUTO: 42.3 FL (ref 35.1–46.3)
EGFRCR SERPLBLD CKD-EPI 2021: 44 ML/MIN/1.73M2 (ref 60–?)
EOSINOPHIL # BLD AUTO: 0.72 X10(3) UL (ref 0–0.7)
EOSINOPHIL NFR BLD AUTO: 11 %
ERYTHROCYTE [DISTWIDTH] IN BLOOD BY AUTOMATED COUNT: 12.4 % (ref 11–15)
EST. AVERAGE GLUCOSE BLD GHB EST-MCNC: 140 MG/DL (ref 68–126)
FASTING PATIENT LIPID ANSWER: YES
FASTING STATUS PATIENT QL REPORTED: YES
GLOBULIN PLAS-MCNC: 2.6 G/DL (ref 2–3.5)
GLUCOSE BLD-MCNC: 120 MG/DL (ref 70–99)
HBA1C MFR BLD: 6.5 % (ref ?–5.7)
HCT VFR BLD AUTO: 35.1 %
HDLC SERPL-MCNC: 39 MG/DL (ref 40–59)
HGB BLD-MCNC: 12.1 G/DL
IMM GRANULOCYTES # BLD AUTO: 0.02 X10(3) UL (ref 0–1)
IMM GRANULOCYTES NFR BLD: 0.3 %
LDLC SERPL CALC-MCNC: 61 MG/DL (ref ?–100)
LYMPHOCYTES # BLD AUTO: 1.67 X10(3) UL (ref 1–4)
LYMPHOCYTES NFR BLD AUTO: 25.5 %
MCH RBC QN AUTO: 32 PG (ref 26–34)
MCHC RBC AUTO-ENTMCNC: 34.5 G/DL (ref 31–37)
MCV RBC AUTO: 92.9 FL
MICROALBUMIN UR-MCNC: 7.6 MG/DL
MICROALBUMIN/CREAT 24H UR-RTO: 101.7 UG/MG (ref ?–30)
MONOCYTES # BLD AUTO: 0.42 X10(3) UL (ref 0.1–1)
MONOCYTES NFR BLD AUTO: 6.4 %
NEUTROPHILS # BLD AUTO: 3.66 X10 (3) UL (ref 1.5–7.7)
NEUTROPHILS # BLD AUTO: 3.66 X10(3) UL (ref 1.5–7.7)
NEUTROPHILS NFR BLD AUTO: 55.7 %
NONHDLC SERPL-MCNC: 91 MG/DL (ref ?–130)
OSMOLALITY SERPL CALC.SUM OF ELEC: 294 MOSM/KG (ref 275–295)
PLATELET # BLD AUTO: 189 10(3)UL (ref 150–450)
POTASSIUM SERPL-SCNC: 4 MMOL/L (ref 3.5–5.1)
PROT SERPL-MCNC: 7.1 G/DL (ref 5.7–8.2)
RBC # BLD AUTO: 3.78 X10(6)UL
SODIUM SERPL-SCNC: 140 MMOL/L (ref 136–145)
TRIGL SERPL-MCNC: 181 MG/DL (ref 30–149)
TSI SER-ACNC: 1.61 UIU/ML (ref 0.55–4.78)
VIT D+METAB SERPL-MCNC: 45 NG/ML (ref 30–100)
VLDLC SERPL CALC-MCNC: 27 MG/DL (ref 0–30)
WBC # BLD AUTO: 6.6 X10(3) UL (ref 4–11)

## 2025-02-18 PROCEDURE — 82570 ASSAY OF URINE CREATININE: CPT

## 2025-02-18 PROCEDURE — 82306 VITAMIN D 25 HYDROXY: CPT

## 2025-02-18 PROCEDURE — 80061 LIPID PANEL: CPT

## 2025-02-18 PROCEDURE — 36415 COLL VENOUS BLD VENIPUNCTURE: CPT

## 2025-02-18 PROCEDURE — 85025 COMPLETE CBC W/AUTO DIFF WBC: CPT

## 2025-02-18 PROCEDURE — 82043 UR ALBUMIN QUANTITATIVE: CPT

## 2025-02-18 PROCEDURE — 84443 ASSAY THYROID STIM HORMONE: CPT

## 2025-02-18 PROCEDURE — 83036 HEMOGLOBIN GLYCOSYLATED A1C: CPT

## 2025-02-18 PROCEDURE — 80053 COMPREHEN METABOLIC PANEL: CPT

## 2025-02-19 ENCOUNTER — OFFICE VISIT (OUTPATIENT)
Dept: RHEUMATOLOGY | Facility: CLINIC | Age: 60
End: 2025-02-19
Payer: MEDICARE

## 2025-02-19 VITALS
TEMPERATURE: 98 F | HEART RATE: 134 BPM | DIASTOLIC BLOOD PRESSURE: 88 MMHG | WEIGHT: 200 LBS | BODY MASS INDEX: 34.15 KG/M2 | RESPIRATION RATE: 16 BRPM | OXYGEN SATURATION: 98 % | SYSTOLIC BLOOD PRESSURE: 154 MMHG | HEIGHT: 64 IN

## 2025-02-19 DIAGNOSIS — M16.11 PRIMARY OSTEOARTHRITIS OF ONE HIP, RIGHT: ICD-10-CM

## 2025-02-19 DIAGNOSIS — M47.896 OTHER OSTEOARTHRITIS OF SPINE, LUMBAR REGION: ICD-10-CM

## 2025-02-19 DIAGNOSIS — R11.0 NAUSEA: ICD-10-CM

## 2025-02-19 DIAGNOSIS — M17.0 PRIMARY OSTEOARTHRITIS OF BOTH KNEES: ICD-10-CM

## 2025-02-19 PROCEDURE — 3008F BODY MASS INDEX DOCD: CPT | Performed by: INTERNAL MEDICINE

## 2025-02-19 PROCEDURE — 99214 OFFICE O/P EST MOD 30 MIN: CPT | Performed by: INTERNAL MEDICINE

## 2025-02-19 PROCEDURE — 3079F DIAST BP 80-89 MM HG: CPT | Performed by: INTERNAL MEDICINE

## 2025-02-19 PROCEDURE — 3077F SYST BP >= 140 MM HG: CPT | Performed by: INTERNAL MEDICINE

## 2025-02-19 RX ORDER — ONDANSETRON 4 MG/1
4 TABLET, ORALLY DISINTEGRATING ORAL EVERY 8 HOURS PRN
Qty: 60 TABLET | Refills: 2 | Status: SHIPPED | OUTPATIENT
Start: 2025-02-19

## 2025-02-19 RX ORDER — HYDROCODONE BITARTRATE AND ACETAMINOPHEN 10; 325 MG/1; MG/1
1 TABLET ORAL EVERY 4 HOURS PRN
Qty: 180 TABLET | Refills: 0 | Status: CANCELLED | OUTPATIENT
Start: 2025-02-19

## 2025-02-19 RX ORDER — OXYCODONE HYDROCHLORIDE 20 MG/1
20 TABLET ORAL 4 TIMES DAILY PRN
Qty: 120 TABLET | Refills: 0 | Status: SHIPPED | OUTPATIENT
Start: 2025-03-08 | End: 2025-04-07

## 2025-02-19 NOTE — PROGRESS NOTES
[facility]  Dr. Negrete Progress Note     Subjective:   Lizbeth Berkowitz is a(n) 59 year old female.   Current complaints:   Chief Complaint   Patient presents with    Osteoarthritis    Follow - Up     LOV: 11/20/24  Patient is here for a follow up visit. Patient states that her pain has been terrible. Pain in knees (9/10), right hip (7/10), hands (8/10), neck (8/10), back (7/10), and shoulders (6/10).   Rapid 3: 7.3   Chronic osteoarthritis in both knees.  S/P L THR by Dr Candelario Godinez of [facility].  On [place] for pain 10 mg dose.  Uses cyclobenzaprine for muscle spasm.  Uses xanax for stress.  In a lot of pain.  Left knee is bad.  Wears a knee brace. Painful knees left  worse than right.  Planning on L TKR in near future.   Objective:   /88   Pulse (!) 134   Temp 97.5 °F (36.4 °C)   Resp 16   Ht 5' 4\" (1.626 m)   Wt 200 lb (90.7 kg)   LMP 11/06/2015   SpO2 98%   BMI 34.33 kg/m²   Lungs clear  Heart nsr    Left knee tender  hypertrophic  Right knee tender hypertrophic   11/7/24 xray left knee - severe osteoarthritis done at [facility].   Assessment:     Encounter Diagnoses   Name Primary?    Nausea     Primary osteoarthritis of both knees     Other osteoarthritis of spine, lumbar region     Primary osteoarthritis of one hip, right      Plan:     Patient Instructions   Trial of Oxycodone for pain relief in place of Norco.  Oxycodone 20 mg use up to 4 times per day.  Zofran as needed for nausea.  Call me in a month a report if you want to continue the Oxycodone or return to the [place].   Xanax as needed for stress.  See Dr Godinez of [facility].  Return to office 3 months.        Wayne Negrete MD 2/19/2025 10:04 AM

## 2025-03-05 DIAGNOSIS — F41.1 ANXIETY STATE: ICD-10-CM

## 2025-03-05 RX ORDER — ALPRAZOLAM 0.5 MG
0.5 TABLET ORAL 3 TIMES DAILY PRN
Qty: 40 TABLET | Refills: 0 | Status: SHIPPED | OUTPATIENT
Start: 2025-03-05

## 2025-03-05 NOTE — TELEPHONE ENCOUNTER
Protocol failed      Requesting alprazolam   LOV: 01/09/25  RTC: 07/09/25  Last Relevant Labs:   Filled: 02/06/25 #40 with 0 refills    Future Appointments   Date Time Provider Department Center   5/19/2025 11:00 AM Wayne Negrete MD [facility]

## 2025-03-21 ENCOUNTER — PATIENT MESSAGE (OUTPATIENT)
Dept: RHEUMATOLOGY | Facility: CLINIC | Age: 60
End: 2025-03-21

## 2025-03-21 DIAGNOSIS — M16.11 PRIMARY OSTEOARTHRITIS OF ONE HIP, RIGHT: ICD-10-CM

## 2025-03-21 DIAGNOSIS — M47.896 OTHER OSTEOARTHRITIS OF SPINE, LUMBAR REGION: ICD-10-CM

## 2025-03-21 DIAGNOSIS — M17.0 PRIMARY OSTEOARTHRITIS OF BOTH KNEES: ICD-10-CM

## 2025-03-24 RX ORDER — OXYCODONE HYDROCHLORIDE 20 MG/1
20 TABLET ORAL 4 TIMES DAILY PRN
Qty: 120 TABLET | Refills: 0 | Status: SHIPPED | OUTPATIENT
Start: 2025-04-07 | End: 2025-05-07

## 2025-03-24 NOTE — TELEPHONE ENCOUNTER
Oxycodone refill 20 mg 1 - 4 times a day as needed #120.  Approved for 4/7/2025 [facility].  Dr. Negrete

## 2025-03-24 NOTE — TELEPHONE ENCOUNTER
LOV: 2/9/25  Future Appointments   Date Time Provider Department Center   5/19/2025 11:00 AM Wayne Negrete MD [facility] [place]       Per  last filled oxycodone 20mg on 3/8/25.   Medication should las 30 days which would be 4/7/25

## 2025-03-30 DIAGNOSIS — F33.0 MILD RECURRENT MAJOR DEPRESSION: ICD-10-CM

## 2025-03-30 DIAGNOSIS — F41.1 ANXIETY STATE: ICD-10-CM

## 2025-03-31 RX ORDER — PAROXETINE 40 MG/1
40 TABLET, FILM COATED ORAL EVERY MORNING
Qty: 30 TABLET | Refills: 3 | Status: SHIPPED | OUTPATIENT
Start: 2025-03-31

## 2025-03-31 NOTE — TELEPHONE ENCOUNTER
Name from pharmacy: PARoxetine HCl Oral Tablet 40 MG         Will file in chart as: PAROXETINE HCL 40 MG Oral Tab    Sig: TAKE 1 TABLET BY MOUTH IN THE MORNING    Disp: 30 tablet    Refills: 0    Start: 3/30/2025    Class: Normal    Non-formulary For: Mild recurrent major depression, Anxiety state    Last ordered: 6 months ago (10/1/2024) by HEAVENLY Sy    Last refill: 3/2/2025    Rx #: [id number]    Psychiatric Non-Scheduled (Anti-Anxiety) [id number]/30/2025 11:05 AM   Protocol Details In person appointment or virtual visit in the past 6 mos or appointment in next 3 mos    Depression Screening completed within the past 12 months    Medication is active on med list      To be filled at: [id number] - [place], [place] - [address]  [phone], [phone]

## 2025-04-03 DIAGNOSIS — F41.1 ANXIETY STATE: ICD-10-CM

## 2025-04-03 RX ORDER — ALPRAZOLAM 0.5 MG
0.5 TABLET ORAL 3 TIMES DAILY PRN
Qty: 40 TABLET | Refills: 0 | Status: SHIPPED | OUTPATIENT
Start: 2025-04-03

## 2025-04-03 NOTE — TELEPHONE ENCOUNTER
Alprazolam 0.5 mg  Filled 3-5-25  Qty 40  0 refills  Future Appointments   Date Time Provider Department Center   5/19/2025 11:00 AM [facility] 1-9-25

## 2025-04-15 ENCOUNTER — PATIENT MESSAGE (OUTPATIENT)
Dept: RHEUMATOLOGY | Facility: CLINIC | Age: 60
End: 2025-04-15

## 2025-04-15 DIAGNOSIS — M16.11 PRIMARY OSTEOARTHRITIS OF ONE HIP, RIGHT: ICD-10-CM

## 2025-04-15 DIAGNOSIS — M17.0 PRIMARY OSTEOARTHRITIS OF BOTH KNEES: ICD-10-CM

## 2025-04-15 DIAGNOSIS — M47.896 OTHER OSTEOARTHRITIS OF SPINE, LUMBAR REGION: ICD-10-CM

## 2025-04-16 RX ORDER — OXYCODONE HYDROCHLORIDE 20 MG/1
20 TABLET ORAL 4 TIMES DAILY PRN
Qty: 120 TABLET | Refills: 0 | Status: SHIPPED | OUTPATIENT
Start: 2025-04-16 | End: 2025-05-16

## 2025-04-16 NOTE — TELEPHONE ENCOUNTER
Oxycodone refill 20 mg 1  -  4 times a day #120 approved.  Sent to [facility] [place].  Dr. Negrete

## 2025-04-16 NOTE — TELEPHONE ENCOUNTER
LOV: 02/19/2025    Future Appointments   Date Time Provider Department Center   5/19/2025 11:00 AM Wayne Negrete MD [facility] [place]       LF PER [place] : 04/07/2025    QTY: 120    Refills: 0

## 2025-04-23 DIAGNOSIS — E11.9 TYPE 2 DIABETES MELLITUS WITHOUT COMPLICATION, WITHOUT LONG-TERM CURRENT USE OF INSULIN (HCC): ICD-10-CM

## 2025-04-27 DIAGNOSIS — E78.00 PURE HYPERCHOLESTEROLEMIA: ICD-10-CM

## 2025-04-27 DIAGNOSIS — E11.9 TYPE 2 DIABETES MELLITUS WITHOUT COMPLICATION, WITHOUT LONG-TERM CURRENT USE OF INSULIN (HCC): ICD-10-CM

## 2025-04-27 DIAGNOSIS — I15.2 HYPERTENSION ASSOCIATED WITH TYPE 2 DIABETES MELLITUS (HCC): ICD-10-CM

## 2025-04-27 DIAGNOSIS — E11.59 HYPERTENSION ASSOCIATED WITH TYPE 2 DIABETES MELLITUS (HCC): ICD-10-CM

## 2025-04-28 RX ORDER — LOSARTAN POTASSIUM 100 MG/1
100 TABLET ORAL DAILY
Qty: 90 TABLET | Refills: 0 | Status: SHIPPED | OUTPATIENT
Start: 2025-04-28

## 2025-04-28 RX ORDER — HYDROCHLOROTHIAZIDE 25 MG/1
25 TABLET ORAL DAILY
Qty: 90 TABLET | Refills: 0 | Status: SHIPPED | OUTPATIENT
Start: 2025-04-28

## 2025-04-28 RX ORDER — ROSUVASTATIN CALCIUM 10 MG/1
10 TABLET, COATED ORAL NIGHTLY
Qty: 90 TABLET | Refills: 0 | Status: SHIPPED | OUTPATIENT
Start: 2025-04-28

## 2025-04-28 RX ORDER — GLIMEPIRIDE 4 MG/1
4 TABLET ORAL
Qty: 90 TABLET | Refills: 0 | Status: SHIPPED | OUTPATIENT
Start: 2025-04-28

## 2025-04-28 NOTE — TELEPHONE ENCOUNTER
Losartan 100 mg    Hypertension Medications Protocol Yalxvd5604/27/2025 11:19 AM   Protocol Details Last BP reading less than 140/90    EGFRCR or GFRNAA > 50      Filled 1-9-25  Qty 90  0 refills  Future Appointments   Date Time Provider Department Center   5/19/2025 11:00 AM Wayne Negrete MD [facility] [place]   LOV 1-9-25 SM    Hydrochlorothiazide 25 mg    Hypertension Medications Protocol Dzhcel4004/27/2025 11:19 AM   Protocol Details Last BP reading less than 140/90    EGFRCR or GFRNAA > 50      Filled 1-9-25  Qty 90  0 refills  Future Appointments   Date Time Provider Department Center   5/19/2025 11:00 AM Wayne Negrete MD [facility] [place]   LOV 1-9-25 SM    Glimepiride 4 mg  Diabetes Medication Protocol Cgqxfo7204/27/2025 11:19 AM   Protocol Details EGFRCR or GFRNAA > 50   Filled 1-9-25  Qty 90  0 refills  Future Appointments   Date Time Provider Department Center   5/19/2025 11:00 AM Wayne Negrete MD [facility] [place]   LOV 1-9-25 SM  Labs 2-18-25 A1c/ microalb./ creat.    Rosuvastatin 10 mg  Filled 1-9-25  Qty 90  0 refills  Future Appointments   Date Time Provider Department Center   5/19/2025 11:00 AM Wayne Negrete MD [facility] [place]   LOV 1-9-25 SM  Labs 2-18-25 Lipid

## 2025-05-02 DIAGNOSIS — F41.1 ANXIETY STATE: ICD-10-CM

## 2025-05-03 RX ORDER — ALPRAZOLAM 0.5 MG
0.5 TABLET ORAL 3 TIMES DAILY PRN
Qty: 40 TABLET | Refills: 0 | Status: SHIPPED | OUTPATIENT
Start: 2025-05-03

## 2025-05-07 DIAGNOSIS — G89.29 CHRONIC LEFT SHOULDER PAIN: ICD-10-CM

## 2025-05-07 DIAGNOSIS — M25.512 TRIGGER POINT OF LEFT SHOULDER REGION: Primary | ICD-10-CM

## 2025-05-07 DIAGNOSIS — M25.512 CHRONIC LEFT SHOULDER PAIN: ICD-10-CM

## 2025-05-08 RX ORDER — CYCLOBENZAPRINE HCL 10 MG
10 TABLET ORAL 3 TIMES DAILY
Qty: 90 TABLET | Refills: 1 | Status: SHIPPED | OUTPATIENT
Start: 2025-05-08

## 2025-05-08 NOTE — TELEPHONE ENCOUNTER
Cyclobenzaprine 10 mg    Future Appointments   Date Time Provider Department Center   5/19/2025 11:00 AM Wayne Negrete MD [facility] [place]     Last office visit: 2/19/2025    Last fill: 8/20/2024 90 tab, 1 refill

## 2025-05-19 ENCOUNTER — OFFICE VISIT (OUTPATIENT)
Dept: RHEUMATOLOGY | Facility: CLINIC | Age: 60
End: 2025-05-19
Payer: MEDICARE

## 2025-05-19 VITALS
BODY MASS INDEX: 33.8 KG/M2 | RESPIRATION RATE: 16 BRPM | SYSTOLIC BLOOD PRESSURE: 114 MMHG | DIASTOLIC BLOOD PRESSURE: 80 MMHG | TEMPERATURE: 97 F | HEART RATE: 121 BPM | OXYGEN SATURATION: 96 % | WEIGHT: 198 LBS | HEIGHT: 64 IN

## 2025-05-19 DIAGNOSIS — M48.02 STENOSIS OF CERVICAL SPINE: Primary | ICD-10-CM

## 2025-05-19 DIAGNOSIS — M47.896 OTHER OSTEOARTHRITIS OF SPINE, LUMBAR REGION: ICD-10-CM

## 2025-05-19 DIAGNOSIS — M19.042 PRIMARY OSTEOARTHRITIS OF BOTH HANDS: ICD-10-CM

## 2025-05-19 DIAGNOSIS — Z96.642 HISTORY OF TOTAL LEFT HIP REPLACEMENT: ICD-10-CM

## 2025-05-19 DIAGNOSIS — M17.11 PRIMARY OSTEOARTHRITIS OF RIGHT KNEE: Primary | ICD-10-CM

## 2025-05-19 DIAGNOSIS — Z87.442 HISTORY OF KIDNEY STONES: ICD-10-CM

## 2025-05-19 DIAGNOSIS — M48.02 STENOSIS OF CERVICAL SPINE: ICD-10-CM

## 2025-05-19 DIAGNOSIS — M19.041 PRIMARY OSTEOARTHRITIS OF BOTH HANDS: ICD-10-CM

## 2025-05-19 DIAGNOSIS — M16.11 PRIMARY OSTEOARTHRITIS OF RIGHT HIP: ICD-10-CM

## 2025-05-19 PROBLEM — M16.12 PRIMARY OSTEOARTHRITIS OF LEFT HIP: Status: RESOLVED | Noted: 2021-09-29 | Resolved: 2025-05-19

## 2025-05-19 PROCEDURE — 3079F DIAST BP 80-89 MM HG: CPT | Performed by: INTERNAL MEDICINE

## 2025-05-19 PROCEDURE — 3074F SYST BP LT 130 MM HG: CPT | Performed by: INTERNAL MEDICINE

## 2025-05-19 PROCEDURE — 99214 OFFICE O/P EST MOD 30 MIN: CPT | Performed by: INTERNAL MEDICINE

## 2025-05-19 PROCEDURE — 3008F BODY MASS INDEX DOCD: CPT | Performed by: INTERNAL MEDICINE

## 2025-05-19 RX ORDER — OXYCODONE HYDROCHLORIDE 20 MG/1
1 TABLET ORAL 4 TIMES DAILY PRN
Qty: 28 TABLET | Refills: 0 | Status: SHIPPED | OUTPATIENT
Start: 2025-07-06 | End: 2025-05-19 | Stop reason: CLARIF

## 2025-05-19 RX ORDER — OXYCODONE HYDROCHLORIDE 20 MG/1
1 TABLET ORAL 4 TIMES DAILY PRN
COMMUNITY
Start: 2025-05-07 | End: 2025-05-19

## 2025-05-19 RX ORDER — OXYCODONE HYDROCHLORIDE 20 MG/1
20 TABLET ORAL 4 TIMES DAILY PRN
Qty: 120 TABLET | Refills: 0 | Status: SHIPPED | OUTPATIENT
Start: 2025-06-06 | End: 2025-07-06

## 2025-05-19 RX ORDER — OXYCODONE HYDROCHLORIDE 20 MG/1
20 TABLET ORAL 4 TIMES DAILY PRN
Qty: 120 TABLET | Refills: 0 | Status: SHIPPED | OUTPATIENT
Start: 2025-07-06 | End: 2025-08-05

## 2025-05-19 RX ORDER — OXYCODONE HYDROCHLORIDE 20 MG/1
1 TABLET ORAL 4 TIMES DAILY PRN
Qty: 28 TABLET | Refills: 0 | Status: SHIPPED | OUTPATIENT
Start: 2025-06-06 | End: 2025-05-19 | Stop reason: CLARIF

## 2025-05-19 RX ORDER — OXYCODONE HYDROCHLORIDE 20 MG/1
20 TABLET ORAL 4 TIMES DAILY PRN
Qty: 120 TABLET | Refills: 0 | Status: SHIPPED | OUTPATIENT
Start: 2025-08-05

## 2025-05-19 RX ORDER — OXYCODONE HYDROCHLORIDE 20 MG/1
1 TABLET ORAL 4 TIMES DAILY PRN
Qty: 28 TABLET | Refills: 0 | Status: SHIPPED | OUTPATIENT
Start: 2025-08-05 | End: 2025-05-19 | Stop reason: CLARIF

## 2025-05-19 NOTE — PATIENT INSTRUCTIONS
Oxycodone 20 mg 4 ties per day as needed for pain.  Off Norco.  Xanax used prn for anxiety 1/2 mg does.  Cyclobenzaprine 10 mg at night for leg cramps.    Stay active as best you can.  Walk with a cane.   Return to office 3 months.

## 2025-05-19 NOTE — PROGRESS NOTES
[facility]  Dr. Negrete Progress Note     Subjective:   Lizbeth Berkowitz is a(n) 59 year old female.   Current complaints:   Chief Complaint   Patient presents with    Follow - Up     Patient here for medication follow up patient states she fell twice about a month ago while walking up stairs she fell and injured right knee causing her some bruising/ pain    Chronic osteoarthritis  right knee.  S/p Left knee replacement.  S/P L TGHR.  Fell a month ago on the stairs.  Injured right knee.  Feel again in the house recently. Pain in left knee then.    broke his foot Good Friday.  Casted, then got an ulcer in the right big toe due to the cast rubbing it.   had an infection, needed surgery,  Now he is home ridden.  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  R TKR is on hold.   On pain medication Oycodone in place of Norco. .  Walking with a cane but feels unstable.  Son age 32 is bipolar, does not have a driving license.    C/o knee pain, back pain, hand pain.   Objective:   /80   Pulse (!) 121   Temp 97.1 °F (36.2 °C)   Resp 16   Ht 5' 4\" (1.626 m)   Wt 198 lb (89.8 kg)   LMP 11/06/2015   SpO2 96%   BMI 33.99 kg/m²   Lungs clear  Heart  nsr   Right knee tender   Left knee scar in midline - tneder over tiba area medially.   No leg edema  Assessment:     Encounter Diagnoses   Name Primary?    Primary osteoarthritis of right knee Yes    Primary osteoarthritis of right hip     Stenosis of cervical spine     History of total left hip replacement     Primary osteoarthritis of both hands     History of kidney stones     Other osteoarthritis of spine, lumbar region      Plan:     Patient Instructions   Oxycodone 20 mg 4 ties per day as needed for pain.  Off Norco.  Xanax used prn for anxiety 1/2 mg does.  Cyclobenzaprine 10 mg at night for leg cramps.    Stay active as best you can.  Walk with a cane.   Return to office 3 months.        Wayne Negrete MD 5/19/2025 11:08 AM

## 2025-06-03 DIAGNOSIS — F41.1 ANXIETY STATE: ICD-10-CM

## 2025-06-03 RX ORDER — ALPRAZOLAM 0.5 MG
0.5 TABLET ORAL 3 TIMES DAILY PRN
Qty: 40 TABLET | Refills: 0 | Status: SHIPPED | OUTPATIENT
Start: 2025-06-03

## 2025-06-23 ENCOUNTER — PATIENT OUTREACH (OUTPATIENT)
Age: 60
End: 2025-06-23

## 2025-06-23 NOTE — PROGRESS NOTES
25 1718   GIOVANI Assessment   Assessment Type GIOVANI Initial   Assessment completed with Patient   Patient Subjective Pt reports she is doing okay. Pt is able to void. Pt is taking the abx as advised. Pt stated she has back pain that comes and goes. Pt rates her pain a 7/10 when it comes. Pt is taking Norco as needed, pt is not taking Oxycodone at this time. Pt is hydrating well. Pt denies fevers/chills, n/v/d and feeling faint.  Pt does not check glucose at home at this time. Pt has no questions at this time.   Chief Complaint Flank pain   Bilateral kidney stones   GIOVANI Navigation Initial Assessment   Verify patient name and  with patient/ caregiver Yes   Tell me what you understand of why you were in the hospital or emergency department Abdominal pain, flank pain   Prior to leaving the hospital were your Discharge Instructions reviewed with you? Yes   Did you receive a copy of your written Discharge Instructions? Yes   What questions do you have about your Discharge Instructions? None at this time   Do you feel better or worse since you left the hospital or emergency department? Better   Do you have a follow-up appointment? No   Were you able to schedule the appt? Not Scheduled   Are there any barriers to getting to your follow-up appointment? No   Prior to leaving the hospital was Home Health (HH) arranged for you? No   Prior to leaving the hospital or emergency department was Durable Medical Equipment (DME), medical supplies, or infusions arranged for you? No   Are DME/medical supply/infusions needs identified by staff during this assessment? No   Did any of your medications change, during or after your hospital stay or ED visit? Yes   Do you have your new or updated medications? Yes   Do you understand what your medications are for and possible side effects? Yes   Are there any reasons that keep you from taking your medication as prescribed? No   Any concerns about medication refills?   (N/A)   Were you  given a different diet per your Discharge Instructions? No   Do you have any questions or concerns that have not been discussed? No       Nursing Interventions: All d/c instructions reviewed with the pt. Reviewed when to call MD vs when to call 911 or go the ED. Educated pt on the importance of taking all meds as prescribed as well as close f/u with PCP/specialists. Pt verbalized understanding and will contact the office with any further questions or concerns. Pt declined to review medication list at this time. Pt confirmed she is taking abx as prescribed.       Medications:  Medication Reconciliation:  I am aware of an inpatient discharge within the last 30 days.  The discharge medication list has not been reconciled with the patient's current medication list and reviewed by me. See medication list for additions of new medication, and changes to current doses of medications and discontinued medications.  Current Outpatient Medications   Medication Sig Dispense Refill    ALPRAZolam 0.5 MG Oral Tab Take 1 tablet (0.5 mg total) by mouth 3 (three) times daily as needed. Take 1 tablet (0.5 mg total) by mouth 3 (three) times daily as needed. APPT DUE JULY 40 tablet 0    oxyCODONE HCl 20 MG Oral Tab Take 1 tablet (20 mg total) by mouth 4 (four) times daily as needed. 120 tablet 0    [START ON 7/6/2025] oxyCODONE HCl 20 MG Oral Tab Take 1 tablet (20 mg total) by mouth 4 (four) times daily as needed. 120 tablet 0    [START ON 8/5/2025] oxyCODONE HCl 20 MG Oral Tab Take 1 tablet (20 mg total) by mouth 4 (four) times daily as needed. 120 tablet 0    CYCLOBENZAPRINE 10 MG Oral Tab TAKE 1 TABLET BY MOUTH 3 TIMES A DAY 90 tablet 1    LOSARTAN 100 MG Oral Tab TAKE 1 TABLET BY MOUTH EVERY DAY 90 tablet 0    GLIMEPIRIDE 4 MG Oral Tab TAKE 1 TABLET BY MOUTH EVERY DAY BEFORE BREAKFAST 90 tablet 0    ROSUVASTATIN 10 MG Oral Tab TAKE 1 TABLET BY MOUTH NIGHTLY 90 tablet 0    HYDROCHLOROTHIAZIDE 25 MG Oral Tab TAKE 1 TABLET BY MOUTH  EVERY DAY 90 tablet 0    METFORMIN HCL 1000 MG Oral Tab TAKE 1 TABLET BY MOUTH 2 TIMES A DAY WITH MEALS 180 tablet 0    PARoxetine HCl 40 MG Oral Tab Take 1 tablet (40 mg total) by mouth every morning. 30 tablet 3    ondansetron 4 MG Oral Tablet Dispersible Place 1 tablet (4 mg total) under the tongue every 8 (eight) hours as needed for Nausea. 60 tablet 2    Cholecalciferol (VITAMIN D) 50 MCG (2000 UT) Oral Cap Take 1 capsule (2,000 Units total) by mouth daily.      Esomeprazole Magnesium 20 MG Oral Powd Pack Take 20 mg by mouth daily.           Follow-up Appointments:  Your appointments       Date & Time Appointment Department ([place])    Aug 19, 2025 1:00 PM CDT Exam - Established with Wayne Negrete MD [facility] ([facility])              [facility]  [address] [place] [address]  [facility] [id number] [phone]            Transitional Care Clinic  Was TCC Ordered: No      Primary Care Provider (If no TCC appointment)  Does patient already have a PCP appointment scheduled? No  Nurse Care Manager Attempted to schedule PCP office TCM/GIOVANI appointment with patient   -If no appointment scheduled: Explain: pt declined as she prefers to FU with Urology     Specialist  Does the patient have any other follow-up appointment(s) need to be scheduled? Yes   -If yes: Nurse Care Manager reviewed upcoming specialist appointments with patient: Yes   -Does the patient need assistance scheduling appointment(s): No- Pt plans to call Urology tomorrow if she does not hear from the office. She confirmed she has Urology contact information. Pt is aware to call [facility] for concerns with scheduling if needed.     [x]  Patient verbally agrees to additional follow-up calls from Nurse Care Manager

## 2025-06-30 ENCOUNTER — PATIENT OUTREACH (OUTPATIENT)
Age: 60
End: 2025-06-30

## 2025-06-30 NOTE — PROGRESS NOTES
7 Day Follow Up Call       Left message on mailbox for patient to call nurse care manager back for transitions of care call.  Nurse care  information included in message.

## 2025-07-01 NOTE — PROGRESS NOTES
7 Day Follow Up Call     Contacted pt for transitions of care, s/w pt briefly. Pt stated she did not need a follow up call. Requested to go over how she is feeling and discuss any questions or concerns however pt declined. Pt then ended the call. Closing encounter.

## 2025-07-02 DIAGNOSIS — F41.1 ANXIETY STATE: ICD-10-CM

## 2025-07-03 RX ORDER — ALPRAZOLAM 0.5 MG
0.5 TABLET ORAL 3 TIMES DAILY PRN
Qty: 40 TABLET | Refills: 0 | Status: SHIPPED | OUTPATIENT
Start: 2025-07-03

## 2025-07-03 NOTE — TELEPHONE ENCOUNTER
Alprazolam 0.5 mg  Filled 6-3-25  Qty 40  0 refills  Future Appointments   Date Time Provider Department Center   8/19/2025  1:00 PM [facility] 1-9-25

## 2025-07-07 ENCOUNTER — PATIENT MESSAGE (OUTPATIENT)
Dept: RHEUMATOLOGY | Facility: CLINIC | Age: 60
End: 2025-07-07

## 2025-07-07 DIAGNOSIS — M47.896 OTHER OSTEOARTHRITIS OF SPINE, LUMBAR REGION: ICD-10-CM

## 2025-07-07 DIAGNOSIS — M17.0 PRIMARY OSTEOARTHRITIS OF BOTH KNEES: Primary | ICD-10-CM

## 2025-07-07 RX ORDER — OXYCODONE HYDROCHLORIDE 20 MG/1
20 TABLET ORAL 4 TIMES DAILY
Qty: 120 TABLET | Refills: 0 | Status: SHIPPED | OUTPATIENT
Start: 2025-07-10 | End: 2025-08-09

## 2025-07-08 ENCOUNTER — TELEPHONE (OUTPATIENT)
Dept: RHEUMATOLOGY | Facility: CLINIC | Age: 60
End: 2025-07-08

## 2025-07-11 ENCOUNTER — PATIENT OUTREACH (OUTPATIENT)
Age: 60
End: 2025-07-11

## 2025-07-11 ENCOUNTER — TELEPHONE (OUTPATIENT)
Dept: INTERNAL MEDICINE CLINIC | Facility: CLINIC | Age: 60
End: 2025-07-11

## 2025-07-11 NOTE — PROGRESS NOTES
25 1455   GIOVANI Assessment   Assessment Type GIOVANI Initial   Assessment completed with Patient   Patient Subjective \"I'm better--I started the antibiotics today and have just been laying around.\" Pt denies fever, chills, headache, vision changes, dizziness, nausea, vomiting, diarrhea, intractable flank/low back/abdominal pain, UTI symptoms or hematuria, bleeding, irregular heartbeat or fast pulse, loss of vision, speech or strength or coordination in any body part, calf pain or swelling, chest pain or shortness of breath at this time--speaking in full, clear sentences.   Chief Complaint Discharge Diagnosis UTI Kidney stones Acute renal failure Hypertension Diabetes Hyperlipidemia Rheumatoid arthritis Hyponatremia Obesity Hypokalemia Anemia   GIOVANI Navigation Initial Assessment   Verify patient name and  with patient/ caregiver Yes   Tell me what you understand of why you were in the hospital or emergency department Pt arrived from home c/o back/flank pain. Pt was seen recently for kidney stones. Pt also c/o frequent urination. Pt had procedure on  to try and remove kidney stones. Urologist wanted patient seen here today to rule out infection.   Prior to leaving the hospital were your Discharge Instructions reviewed with you? Yes   Did you receive a copy of your written Discharge Instructions? Yes   What questions do you have about your Discharge Instructions? none   Do you feel better or worse since you left the hospital or emergency department? Better   Do you have a follow-up appointment? No   Were you able to schedule the appt? Not Scheduled   Are there any barriers to getting to your follow-up appointment? No   Prior to leaving the hospital was Home Health (HH) arranged for you? No   Prior to leaving the hospital or emergency department was Durable Medical Equipment (DME), medical supplies, or infusions arranged for you? No   Are DME/medical supply/infusions needs identified by staff during this  Yes assessment? No   Did any of your medications change, during or after your hospital stay or ED visit? Yes   Do you have your new or updated medications? Yes   Do you understand what your medications are for and possible side effects? Yes   Are there any reasons that keep you from taking your medication as prescribed? No   Any concerns about medication refills? No   Were you given a different diet per your Discharge Instructions? No   Reason n/a   Do you have any questions or concerns that have not been discussed? No     Discussed diet, activity, medications, home care and need for f/u visits--pt has stopped hydrochlorothiazide and Losartan, as directed. Offered TCM/GIOVANI with PETER Navarrete--pt declines. Pt prefers to rest at home, complete Augmentin course and confirms 7/25/25 laser lithotripsy, as scheduled. Sent TE to office staff as FYI/GIOVANI protocol.  Patient aware when to contact PCP/specialists and when to seek emergency care. No further questions/concerns at this time.    JUL 25 2025 10:35 AM - Hospital Encounter  [facility] Jennifer Altamirano Same Day Surgery - Nav Fiore MD     JUL 25 2025 10:35 AM - Surgery  [facility] Jennifer Altamirano Same Day Surgery - Nav Fiore MD       Future Appointments   Date Time Provider Department Center   8/19/2025  1:00 PM Wayne Negrete MD [facility] [place]

## 2025-07-11 NOTE — TELEPHONE ENCOUNTER
Sent as FYI/GIOVANI protocol:    Spoke with patient for Transitions of Care call today.  Offered TCM/GIOVANI with PETER Navarrete--pt declines. Pt prefers to rest at home, complete Augmentin course and confirms 7/25/25 laser lithotripsy, as scheduled. Patient aware when to contact PCP/specialists and when to seek emergency care. No further questions/concerns at this time.      TCM/GIOVANI appointment needed by 7/17/25.    BOOK BY DATE: 7/24/25 JUL 25 2025 10:35 AM - Hospital Encounter  [facility] Same Day Surgery - Nav Fiore MD     JUL 25 2025 10:35 AM - Surgery  [facility] Same Day Surgery - Nav Fiore MD       Future Appointments   Date Time Provider Department Center   8/19/2025  1:00 PM Wayne Negrete MD [facility] [place]

## 2025-07-20 DIAGNOSIS — E11.9 TYPE 2 DIABETES MELLITUS WITHOUT COMPLICATION, WITHOUT LONG-TERM CURRENT USE OF INSULIN (HCC): ICD-10-CM

## 2025-07-21 ENCOUNTER — PATIENT MESSAGE (OUTPATIENT)
Dept: RHEUMATOLOGY | Facility: CLINIC | Age: 60
End: 2025-07-21

## 2025-07-21 DIAGNOSIS — Z96.642 HISTORY OF TOTAL LEFT HIP REPLACEMENT: ICD-10-CM

## 2025-07-21 DIAGNOSIS — M17.0 PRIMARY OSTEOARTHRITIS OF BOTH KNEES: ICD-10-CM

## 2025-07-21 DIAGNOSIS — M47.896 OTHER OSTEOARTHRITIS OF SPINE, LUMBAR REGION: ICD-10-CM

## 2025-07-21 DIAGNOSIS — G43.019 INTRACTABLE MIGRAINE WITHOUT AURA AND WITHOUT STATUS MIGRAINOSUS: Primary | ICD-10-CM

## 2025-07-21 NOTE — TELEPHONE ENCOUNTER
Metformin 1000 mg  Filled 4-23-25  Qty 180  0 refills  Future Appointments   Date Time Provider Department Center   8/19/2025  1:00 PM [facility] 1-9-25

## 2025-07-22 RX ORDER — OXYCODONE HYDROCHLORIDE 20 MG/1
20 TABLET ORAL 3 TIMES DAILY
Qty: 90 TABLET | Refills: 0 | Status: SHIPPED | OUTPATIENT
Start: 2025-08-09 | End: 2025-09-08

## 2025-07-22 NOTE — TELEPHONE ENCOUNTER
Oxycodone refill changed to August 9 for 20 mg 3 times a day #90.  Sent to [facility] in [place].  Dr. Carr discontinue August 5.

## 2025-07-23 ENCOUNTER — TELEPHONE (OUTPATIENT)
Facility: CLINIC | Age: 60
End: 2025-07-23

## 2025-07-23 DIAGNOSIS — R11.0 NAUSEA: Primary | ICD-10-CM

## 2025-07-23 RX ORDER — OXYCODONE HYDROCHLORIDE 20 MG/1
20 TABLET ORAL 4 TIMES DAILY PRN
Qty: 120 TABLET | Refills: 0 | Status: SHIPPED | OUTPATIENT
Start: 2025-08-09 | End: 2025-10-08

## 2025-07-23 RX ORDER — ONDANSETRON 4 MG/1
4 TABLET, ORALLY DISINTEGRATING ORAL EVERY 8 HOURS PRN
Qty: 90 TABLET | Refills: 0 | Status: SHIPPED | OUTPATIENT
Start: 2025-07-23

## 2025-07-23 RX ORDER — ONDANSETRON 4 MG/1
4 TABLET, ORALLY DISINTEGRATING ORAL EVERY 8 HOURS PRN
Qty: 60 TABLET | Refills: 2 | Status: SHIPPED | OUTPATIENT
Start: 2025-07-23

## 2025-07-23 NOTE — TELEPHONE ENCOUNTER
Zofran T refill approved 1 as needed #90 sent to [facility].  Also changed oxycodone 20 mg to 4 times a day #120 to be filled on August 9.  [facility].  Dr. Negrete

## 2025-07-23 NOTE — TELEPHONE ENCOUNTER
Pharmacy called office to clarify prescription for Oxycodone. Received multiple prescriptions.. reviewed Dr. Negrete last note for clarification     Also changed oxycodone 20 mg to 4 times a day #120 to be filled on August 9.  [facility].  Dr. Negrete

## 2025-07-23 NOTE — TELEPHONE ENCOUNTER
Ondansetron 4mg, 60 tablets, 2 refills    Last office visit: 5/19/25    Next Rheum Apt:8/19/2025 Wayne Negrete MD    Last fill: 2/19/25    Labs:   Lab Results   Component Value Date    CREATSERUM 1.38 (H) 02/18/2025    GFR 59 (L) 09/05/2014    ALKPHO 61 02/18/2025    AST 14 02/18/2025    ALT 11 02/18/2025    BILT 0.3 02/18/2025    TP 7.1 02/18/2025    ALB 4.5 02/18/2025       Lab Results   Component Value Date    WBC 6.6 02/18/2025    HGB 12.1 02/18/2025    .0 02/18/2025    NEPRELIM 3.66 02/18/2025    NEPERCENT 55.7 02/18/2025    LYPERCENT 25.5 02/18/2025    NE 3.66 02/18/2025    LYMABS 1.67 02/18/2025

## 2025-07-27 DIAGNOSIS — E11.9 TYPE 2 DIABETES MELLITUS WITHOUT COMPLICATION, WITHOUT LONG-TERM CURRENT USE OF INSULIN (HCC): ICD-10-CM

## 2025-07-27 DIAGNOSIS — I15.2 HYPERTENSION ASSOCIATED WITH TYPE 2 DIABETES MELLITUS (HCC): ICD-10-CM

## 2025-07-27 DIAGNOSIS — F33.0 MILD RECURRENT MAJOR DEPRESSION: ICD-10-CM

## 2025-07-27 DIAGNOSIS — F41.1 ANXIETY STATE: ICD-10-CM

## 2025-07-27 DIAGNOSIS — E11.59 HYPERTENSION ASSOCIATED WITH TYPE 2 DIABETES MELLITUS (HCC): ICD-10-CM

## 2025-07-27 DIAGNOSIS — E78.00 PURE HYPERCHOLESTEROLEMIA: ICD-10-CM

## 2025-07-29 RX ORDER — PAROXETINE 40 MG/1
40 TABLET, FILM COATED ORAL EVERY MORNING
Qty: 30 TABLET | Refills: 0 | Status: SHIPPED | OUTPATIENT
Start: 2025-07-29

## 2025-07-29 RX ORDER — HYDROCHLOROTHIAZIDE 25 MG/1
25 TABLET ORAL DAILY
Qty: 90 TABLET | Refills: 0 | Status: SHIPPED | OUTPATIENT
Start: 2025-07-29

## 2025-07-29 RX ORDER — ROSUVASTATIN CALCIUM 10 MG/1
10 TABLET, COATED ORAL NIGHTLY
Qty: 90 TABLET | Refills: 0 | Status: SHIPPED | OUTPATIENT
Start: 2025-07-29

## 2025-07-29 RX ORDER — GLIMEPIRIDE 4 MG/1
4 TABLET ORAL
Qty: 90 TABLET | Refills: 0 | Status: SHIPPED | OUTPATIENT
Start: 2025-07-29

## 2025-08-01 DIAGNOSIS — F41.1 ANXIETY STATE: ICD-10-CM

## 2025-08-01 RX ORDER — ALPRAZOLAM 0.5 MG
0.5 TABLET ORAL 3 TIMES DAILY PRN
Qty: 40 TABLET | Refills: 0 | Status: SHIPPED | OUTPATIENT
Start: 2025-08-01

## 2025-08-07 ENCOUNTER — TELEPHONE (OUTPATIENT)
Dept: INTERNAL MEDICINE CLINIC | Facility: CLINIC | Age: 60
End: 2025-08-07

## 2025-08-07 ENCOUNTER — PATIENT OUTREACH (OUTPATIENT)
Age: 60
End: 2025-08-07

## 2025-08-07 RX ORDER — PANTOPRAZOLE SODIUM 40 MG/1
40 TABLET, DELAYED RELEASE ORAL
COMMUNITY
Start: 2025-08-07 | End: 2025-09-06

## 2025-08-07 RX ORDER — LIDOCAINE 4 G/G
1 PATCH TOPICAL DAILY
COMMUNITY
Start: 2025-08-06 | End: 2025-08-16

## 2025-08-07 RX ORDER — SUCRALFATE 1 G/1
1 TABLET ORAL
COMMUNITY
Start: 2025-08-06 | End: 2025-08-13

## 2025-08-19 ENCOUNTER — OFFICE VISIT (OUTPATIENT)
Dept: RHEUMATOLOGY | Facility: CLINIC | Age: 60
End: 2025-08-19

## 2025-08-19 ENCOUNTER — OFFICE VISIT (OUTPATIENT)
Dept: INTERNAL MEDICINE CLINIC | Facility: CLINIC | Age: 60
End: 2025-08-19

## 2025-08-19 VITALS
SYSTOLIC BLOOD PRESSURE: 168 MMHG | HEIGHT: 64 IN | OXYGEN SATURATION: 96 % | RESPIRATION RATE: 16 BRPM | TEMPERATURE: 98 F | DIASTOLIC BLOOD PRESSURE: 90 MMHG | WEIGHT: 192 LBS | BODY MASS INDEX: 32.78 KG/M2 | HEART RATE: 117 BPM

## 2025-08-19 VITALS
RESPIRATION RATE: 18 BRPM | HEIGHT: 64 IN | DIASTOLIC BLOOD PRESSURE: 90 MMHG | TEMPERATURE: 97 F | OXYGEN SATURATION: 97 % | BODY MASS INDEX: 32.74 KG/M2 | WEIGHT: 191.81 LBS | SYSTOLIC BLOOD PRESSURE: 140 MMHG | HEART RATE: 108 BPM

## 2025-08-19 DIAGNOSIS — M48.02 STENOSIS OF CERVICAL SPINE: ICD-10-CM

## 2025-08-19 DIAGNOSIS — M47.896 OTHER OSTEOARTHRITIS OF SPINE, LUMBAR REGION: ICD-10-CM

## 2025-08-19 DIAGNOSIS — K21.9 GASTROESOPHAGEAL REFLUX DISEASE WITHOUT ESOPHAGITIS: ICD-10-CM

## 2025-08-19 DIAGNOSIS — M16.11 PRIMARY OSTEOARTHRITIS OF RIGHT HIP: ICD-10-CM

## 2025-08-19 DIAGNOSIS — I10 ESSENTIAL HYPERTENSION, BENIGN: Primary | ICD-10-CM

## 2025-08-19 DIAGNOSIS — Z96.642 HISTORY OF TOTAL LEFT HIP REPLACEMENT: ICD-10-CM

## 2025-08-19 DIAGNOSIS — M17.11 PRIMARY OSTEOARTHRITIS OF RIGHT KNEE: Primary | ICD-10-CM

## 2025-08-19 DIAGNOSIS — F41.1 ANXIETY STATE: ICD-10-CM

## 2025-08-19 DIAGNOSIS — Z12.11 ENCOUNTER FOR SCREENING FECAL OCCULT BLOOD TESTING: ICD-10-CM

## 2025-08-19 DIAGNOSIS — E11.65 TYPE 2 DIABETES MELLITUS WITH HYPERGLYCEMIA, WITHOUT LONG-TERM CURRENT USE OF INSULIN (HCC): Chronic | ICD-10-CM

## 2025-08-19 DIAGNOSIS — N18.31 STAGE 3A CHRONIC KIDNEY DISEASE (HCC): Chronic | ICD-10-CM

## 2025-08-19 DIAGNOSIS — M79.641 RIGHT HAND PAIN: ICD-10-CM

## 2025-08-19 DIAGNOSIS — Z87.442 HISTORY OF KIDNEY STONES: ICD-10-CM

## 2025-08-19 DIAGNOSIS — Z96.0 URETERAL STENT PRESENT: ICD-10-CM

## 2025-08-19 PROBLEM — Z98.890 H/O LEFT KNEE SURGERY: Status: ACTIVE | Noted: 2025-08-19

## 2025-08-19 PROCEDURE — 99214 OFFICE O/P EST MOD 30 MIN: CPT | Performed by: INTERNAL MEDICINE

## 2025-08-19 PROCEDURE — 3077F SYST BP >= 140 MM HG: CPT | Performed by: INTERNAL MEDICINE

## 2025-08-19 PROCEDURE — 99214 OFFICE O/P EST MOD 30 MIN: CPT | Performed by: FAMILY MEDICINE

## 2025-08-19 PROCEDURE — 3044F HG A1C LEVEL LT 7.0%: CPT | Performed by: FAMILY MEDICINE

## 2025-08-19 PROCEDURE — 3060F POS MICROALBUMINURIA REV: CPT | Performed by: FAMILY MEDICINE

## 2025-08-19 PROCEDURE — 3080F DIAST BP >= 90 MM HG: CPT | Performed by: FAMILY MEDICINE

## 2025-08-19 PROCEDURE — 3080F DIAST BP >= 90 MM HG: CPT | Performed by: INTERNAL MEDICINE

## 2025-08-19 PROCEDURE — 3061F NEG MICROALBUMINURIA REV: CPT | Performed by: FAMILY MEDICINE

## 2025-08-19 PROCEDURE — 3077F SYST BP >= 140 MM HG: CPT | Performed by: FAMILY MEDICINE

## 2025-08-19 PROCEDURE — 3008F BODY MASS INDEX DOCD: CPT | Performed by: INTERNAL MEDICINE

## 2025-08-19 PROCEDURE — 3008F BODY MASS INDEX DOCD: CPT | Performed by: FAMILY MEDICINE

## 2025-08-19 RX ORDER — PANTOPRAZOLE SODIUM 40 MG/1
40 TABLET, DELAYED RELEASE ORAL
Qty: 90 TABLET | Refills: 1 | Status: SHIPPED | OUTPATIENT
Start: 2025-08-19

## 2025-08-19 RX ORDER — OXYCODONE HYDROCHLORIDE 20 MG/1
20 TABLET ORAL 4 TIMES DAILY PRN
Qty: 120 TABLET | Refills: 0 | Status: SHIPPED | OUTPATIENT
Start: 2025-11-07

## 2025-08-19 RX ORDER — OXYCODONE HYDROCHLORIDE 20 MG/1
20 TABLET ORAL 4 TIMES DAILY PRN
Qty: 120 TABLET | Refills: 0 | Status: SHIPPED | OUTPATIENT
Start: 2025-09-08

## 2025-08-19 RX ORDER — OXYCODONE HYDROCHLORIDE 20 MG/1
20 TABLET ORAL 4 TIMES DAILY PRN
Qty: 120 TABLET | Refills: 0 | Status: SHIPPED | OUTPATIENT
Start: 2025-10-08

## 2025-08-20 ENCOUNTER — TELEPHONE (OUTPATIENT)
Dept: INTERNAL MEDICINE CLINIC | Facility: CLINIC | Age: 60
End: 2025-08-20

## 2025-08-27 DIAGNOSIS — F33.0 MILD RECURRENT MAJOR DEPRESSION: ICD-10-CM

## 2025-08-27 DIAGNOSIS — F41.1 ANXIETY STATE: ICD-10-CM

## 2025-08-27 RX ORDER — PAROXETINE 40 MG/1
40 TABLET, FILM COATED ORAL EVERY MORNING
Qty: 90 TABLET | Refills: 0 | Status: SHIPPED | OUTPATIENT
Start: 2025-08-27

## (undated) DIAGNOSIS — F41.1 ANXIETY STATE: ICD-10-CM

## (undated) DIAGNOSIS — E11.59 HYPERTENSION ASSOCIATED WITH TYPE 2 DIABETES MELLITUS (HCC): ICD-10-CM

## (undated) DIAGNOSIS — E11.9 TYPE 2 DIABETES MELLITUS WITHOUT COMPLICATION, WITHOUT LONG-TERM CURRENT USE OF INSULIN (HCC): ICD-10-CM

## (undated) DIAGNOSIS — I15.2 HYPERTENSION ASSOCIATED WITH TYPE 2 DIABETES MELLITUS (HCC): ICD-10-CM

## (undated) DEVICE — CERAMIC HEAD UPCHARGE: Type: IMPLANTABLE DEVICE

## (undated) DEVICE — SYRINGE 30ML LL TIP

## (undated) DEVICE — CHLORAPREP 26ML APPLICATOR

## (undated) DEVICE — TOTAL HIP W/POR CUP & COCR HD: Type: IMPLANTABLE DEVICE

## (undated) DEVICE — SOL  .9 3000ML

## (undated) DEVICE — LIGHT HANDLE

## (undated) DEVICE — TOTAL HIP CDS: Brand: MEDLINE INDUSTRIES, INC.

## (undated) DEVICE — STERILE POLYISOPRENE POWDER-FREE SURGICAL GLOVES: Brand: PROTEXIS

## (undated) DEVICE — SUTURE VICRYL 1 OS-6

## (undated) DEVICE — 450 ML BOTTLE OF 0.05% CHLORHEXIDINE GLUCONATE IN 99.95% STERILE WATER FOR IRRIGATION, USP AND APPLICATOR.: Brand: IRRISEPT ANTIMICROBIAL WOUND LAVAGE

## (undated) DEVICE — 2T11 #2 PDO 36 X 36: Brand: 2T11 #2 PDO 36 X 36

## (undated) DEVICE — STERILE POLYISOPRENE POWDER-FREE SURGICAL GLOVES WITH EMOLLIENT COATING: Brand: PROTEXIS

## (undated) DEVICE — GOWN SURG AERO CHROME XXL

## (undated) DEVICE — HOOD: Brand: FLYTE

## (undated) DEVICE — SCD SLEEVE KNEE HI BLEND

## (undated) DEVICE — BIPOLAR SEALER 23-112-1 AQM 6.0: Brand: AQUAMANTYS™

## (undated) DEVICE — BLADE ELECTRODE: Brand: EDGE

## (undated) DEVICE — PILLOW FX 56X38X15CM MED HIP

## (undated) DEVICE — SUTURE VICRYL 2-0 CP-1

## (undated) DEVICE — DRAPE,U/SHT,SPLIT,FILM,60X84,STERILE: Brand: MEDLINE

## (undated) DEVICE — STERILE HOOK LOCK LATEX FREE ELASTIC BANDAGE 6INX5YD: Brand: HOOK LOCK™

## (undated) DEVICE — HOOD, PEEL-AWAY: Brand: FLYTE

## (undated) DEVICE — NEEDLE SPINAL 18X3-1/2 PINK.

## (undated) DEVICE — Device: Brand: STABLECUT®

## (undated) NOTE — MR AVS SNAPSHOT
[place]  [address] [address]  [address] [facility] [id number] [phone]               Thank you for choosing us for your health care visit with Anand Cleveland NP.   We are glad to serve you and happy to provide you with this sum ALPRAZolam 0.5 MG Tabs   Take 1 tablet (0.5 mg total) by mouth 3 (three) times daily as needed. What changed:  See the new instructions.    Commonly known as:  XANAX           Atorvastatin Calcium 20 MG Tabs   Take 1 tablet (20 mg total) by m UROBILINOGEN,SEMI-QN 0.2 0.0 - 1.9 mg/dL    NITRITE, URINE Negative Negative    LEUKOCYTES Negative Negative    APPEARANCE Clear Clear    URINE-COLOR Yellow Yellow    Multistix Lot# [id number] Numeric    Multistix Expiration Date 10/2017 Date

## (undated) NOTE — LETTER
08/15/18        [address] [place] [address]      Dear Jaya Segura,    [address] [facility] records indicate that you have outstanding lab work and or testing that was ordered for you and has not yet been completed:          LIPID PANEL      COMP M

## (undated) NOTE — MR AVS SNAPSHOT
[facility] [place]  [address] [facility],Fourth Floor, [address]  [address] [place] [address] [phone]               Thank you for choosing us for your health care visit with Tomer Vargas MD.  We are glad to serve you and happy to provide you with this Orthopedics for evaluation of joint  replacement.   Return to see Dr. Radha Rizvi in 3 months       Allergies as of Mar 16, 2017     Etodolac     GI UPSET                Today's Vital Signs     BP Pulse Height Weight BMI    120/90 mmHg 88 65\" 210 lb 34.95 kg/m2 Now link in the [employer]. Enter your [employer] Activation Code exactly as it appears below along with your Zip Code and Date of Birth to complete the sign-up process. If you do not sign up before the expiration date, you must request a new code.     Julianna Ames

## (undated) NOTE — LETTER
11/07/18        [address] [facility] [address]      Dear Elizabeth Amaya,    [address] [facility] records indicate that you have outstanding lab work and or testing that was ordered for you and has not yet been completed:  Orders Placed This Encounter

## (undated) NOTE — LETTER
[facility] Testing Department  Phone: [phone]  Right Fax: [phone]  [facility] By: Yasmin Patel RN Date: 21    Patient Name: Ruchi Karyna  Surgery Date: 2021    CSN: [id number]  Medical Record: [id number]   : 9

## (undated) NOTE — LETTER
OSR/ANI Notification    To: Dr. Emigdio Patel         Date:  9/3/2021  Fax #: [phone]    Patient Name: Sofia Cisneros / Sex: 9/23/1965-A: 60 y  female         CSN: [id number]         Medical Records: [id number]      Surgeon(s):  Nandini Combs

## (undated) NOTE — MR AVS SNAPSHOT
After Visit Summary   8/29/2019    Bette Persaud    MRN: [id number]           Visit Information     Date & Time  8/29/2019 11:00 AM Provider  [facility]  [address] [place] [address], [facility].  Phone  [phone] [[id number]]    Decreased hearing of left ear   [0553630]    Current smoker   [820263]    Low blood potassium   [851729]    Other osteoarthritis of spine, lumbar region   [5529111]    Primary osteoarthritis of both knees   [336836]    Class 2 severe obesity Around August 29, 2019   Imaging:   FIORELLA SCREENING BILAT ([id number])    Instructions: To schedule an appointment for your radiology test please call [facility] Scheduling at [phone].         Instructions      Marce Berkowitz's SCREENING S meet one of the following criteria:   • Men who are 73-68 years old and have smoked more than 100 cigarettes in their lifetime   • Anyone with a family history    Colorectal Cancer Screening  Covered up to Age 76     Colonoscopy Screen   Covered every 10 y Covered Annually No orders found for this or any previous visit. Please get every year    Pneumococcal 13 (Prevnar)  Covered Once after 65 No orders found for this or any previous visit.  Please get once after your 65th birthday    Pneumococcal 23 (Pneumova patients. Video Visits are available Monday - Friday for many common conditions such as allergies, colds, cough, fever, rash, sore throat, headache and pink eye.   The cost for a Video Visit is currently $35.         If you receive a survey from [facility] *Cost varies based on your insurance coverage  For more information about hours, locations or appointment options available at [facility],  visit: [email or web address] or call [phone]. [phone]. ([phone])

## (undated) NOTE — LETTER
2/17/2020      [address] [facility] [address]  [place] [id number]      RE: Appointment Needed for Continuous Medication Refill     Dear Landon Berkowitz: We recently received a request to refill your Paroxetine HCI 40 mg tablets.  Misty Garibay

## (undated) NOTE — LETTER
OPIOID TREATMENT AGREEMENT    For Pain Management      Please read each statement, initial at the bottom of each page, and sign the last page to indicate your agreement with this form. If you have any questions about any information in this form or the opioid treatment plan, please request immediate clarification from your physician or health care provider.     The purpose of this agreement is to give you information about the medications you will be taking for pain management and to assure that you and your physician/health care provider comply with state and federal regulations concerning the prescribing of controlled substances. A trial of opioid therapy can be considered for moderate to severe pain with the intent of reducing pain and increasing function. The physician’s goal is for you to have the best quality of life possible given the reality of your clinical condition. The success of treatment depends on mutual trust and honesty in the physician/patient relationship and full agreement and understanding of the risks and benefits of using opioids to treat pain.    I agree to use opioids (morphine-like drugs) as part of my treatment for chronic pain.  I understand that these drugs can be effective, but have a high potential for misuse and are therefore closely controlled by the local, state, and federal government. I understand that my physician/health care provider is prescribing such medication to help manage my pain, and I agree to the following conditions as part of my treatment plan    I am responsible for my pain medications.  I agree to take the medication only as prescribed.    I understand that increasing my dose without the close supervision of my physician could    lead to drug overdose causing severe sedation and respiratory depression and death.    I understand that decreasing or stopping my medication without the close supervision of my physician can lead to withdrawal. Withdrawal symptoms can  include yawning, sweating, watery eyes, runny nose, anxiety, tremors, aching muscles, hot and cold flashes, “goose bumps”, abdominal cramps, and diarrhea.  These symptoms can occur 24-48 hours after the last dose and can last up to 3 weeks.    I will not request or accept controlled substance medication from any other physician or individual while I am receiving such medication from my physician/health care provider at the clinic.    I acknowledge that there are side effects with opioid therapy, and I understand it is my responsibility to notify my physician/health care provider for any side effect that continue or are severe (i.e., difficulty breathing, slow heart rate, sedation, confusion).  I am also responsible for notifying my pain physician immediately if I need to visit another physician or need to visit an emergency room due to pain, of if I become pregnant.    I understand that the opioid medication is strictly for my own use and I agree not to give or sell my medication to others because it may endanger another person’s health and is against the law.    I will inform my physician of all medications I am taking, including herbal remedies.  Medications like Valium or Ativan; sedatives such as Soma, Xanax, Fiorinal; antihistamines like Benadryl; herbal remedies, alcohol, and cough syrup containing alcohol, codeine, or hydrocodone can interact with opioids and produce serious side effects.    I understand I will be expected to return to the clinic as instructed by my provider during the time any of my medication is being adjusted.    I understand that any evidence of drug hoarding, acquisition of any opioid medication or adjunctive analgesia from other physicians (which includes emergency rooms), uncontrolled dose escalation or reduction, loss of prescriptions or failure to follow agreement may result in change to the treatment plan, referral to the Medication Assisted Therapy Program, an may result in  termination of the physician/patient relationship.    I will not use any illicit substances, such as cocaine, marijuana, etc. while taking these medications.  I understand that use of any illicit substances while taking these medications may result in a change to my treatment plan, referral to the Medication Assisted Therapy Clinic, and may result in termination of the physician/patient relationship.    I understand that I should not consume alcohol while taking these medications because the use of alcohol together with opioid medications is warned against.    I am responsible for my opioid prescriptions.  I understand that:    Refill prescriptions can be written for a one month supply and increased to a maximum of two months at the discretion of the provider.    It is my responsibility to schedule appointments for the next opioid refill to ensure I do not run out of medications.    I am responsible for keeping my prescriptions and pain medications in a safe and secure place, such as a locked cabinet or safe.  I am expected to protect my medications from loss or theft.  I am responsible for taking the medication in the dose prescribed and for keeping track of the amount remaining.  If my medication is stolen, I will report this to my local police department and obtain a stolen item report.  I will then report the stolen medication to my physician.  If my medications are lost, misplaced, or stolen, my physician may choose not to replace the medications.    Refills issued by physicians/health care providers in this clinic can only be filled by a pharmacy in the State of [place], even if I am a resident of another state.    Prescriptions for pain medicine or any other prescriptions will be written only during an office visit or during regular office hours.  No refills of any medications during the evening or on weekends.    I must bring back all opioid medications and adjunctive medications prescribed by my physician  in the original containers/bottles at every visit.    Prescriptions will not be written in advance due to vacations, meetings, or other commitments.    If an appointment for a prescription refill is missed, another appointment will be made as soon as possible.  Immediate or emergency appointments will not be possible.    I understand while physical dependence is to be expected after long-term use of opioids, signs of addiction, abuse, or misuse shall prompt the need for substance dependence treatment as well as weaning and detoxification from the opioids.  I further understand the following:    Physical dependence is common to many drugs such as blood pressure medications, anti-seizure medications, and opioids.  It results in biochemical changes such that abruptly stopping these drugs will cause a withdrawal response.  It should be noted that physical dependence does not equal addiction.  A person can be dependent on insulin to treat diabetes or dependent on prednisone (steroids) to treat asthma, but not addicted to the insulin or prednisone.    Addiction is a primary, chronic neurobiologic disease with genetic, psychosocial and environmental factors influencing its development and manifestation.  It is characterized by behavior that includes one or more of the following: impaired control over drug use, compulsive use, continued use despite harm, and cravings.  This means the drug decreases a person’s quality of life.  If a patient exhibits such behavior, the drug will be tapered and the patient will not be a candidate for an opioid trial.  He/she will be referred to an addiction medicine specialist.    Tolerance means a state of adaption in which exposure to the drug induces changes that result in a lessening of one or more of the drug’s effects over time.  The dose of the opioid may have to be adjusted up or down to a dose that produces maximum function and a realistic decrease of the patient’s pain.    If it  appears to my physician/health care provider that there is no improvement in my daily function or quality of life from the controlled substance, I understand my opioids may be discontinued.    If I have a history of alcohol or drug misuse/addiction, I must notify the physician of such history since the treatment with opioids for may increase the possibility of relapse.    I agree and understand that my physician reserves the right to perform random or unannounced urine drug testing.  If requested to provide a urine sample, I agree to cooperate.  If I decide not to provide a urine sample, I understand that my physician may change my treatment plan, including discontinuation of my opioid medications when applicable or complete termination of the physician/patient relationship.  The presence of non-prescribed drug(s) or illicit drug(s) in the urine can be grounds for termination of the physician/patient relationship.  Urine drug testing is not forensic testing, but is done for my benefit as a diagnostic tool and in accordance with certain legal and regulatory guidance on the use of controlled substances to treat pain.    I agree to allow my physician/yeimi care provider to contact any health care professional, including behavioral health, family member, pharmacy, legal authority, or regulator agency to obtain or provide information about my care or actions if the physician feels it is necessary.    I understand that non-compliance with the above conditions may result in a re-evaluation of my treatment plan, referral to the Medication Assisted Therapy Clinic, discontinuation of opioid therapy, and possible discharge from the clinic.    I agree to work closely with my physician/health care provider to assure the agreed plan of care is followed.    I acknowledge that I have received a copy of this agreement for my records.          I __________________________________________ have read the above information or it has been  read to me.  All my questions regarding the treatment of pain with opioids have been answered to my satisfaction, and I understand all of the conditions of my participation in the opioid treatment plan listed above.  I hereby agree to the conditions listed about and consent to participate in the opioid medication therapy.        ______________________________    ____________    ______________________________              Patient Signature                                  Date                        Patient Printed Name  ______________________________    ____________    ______________________________              Witness Signature                                Date                       Witness Printed Name

## (undated) NOTE — IP AVS SNAPSHOT
Patient Demographics     Address  [address] [address] Phone  [phone] [facility])  [phone] (Mobile) *Preferred* E-mail Address  [email or web address]      Emergency Contact(s)     Name Relation Home Work Mobile    Andrulis,Mar orders. [address] [facility]            Driving  • Do not drive until cleared by surgeon. This is usually four to six weeks after surgery. Discuss this at follow-up office visit.    • Not allowed while taking narcotic pain medication or muscle relaxan monitoring. • You will bleed easier and bruise easier while on these medications. • Usually you will be on a blood thinner for about 4-5 weeks. • Contact your physician if you have signs of bruising, nose bleeds or blood in your urine.  Use electric r fiber rich foods and drink plenty of fluids. • Use stool softeners such as Colace or Senakot while on narcotics, and laxatives such as Miralax or Milk of Magnesia if needed. • An enema or suppository may be needed if above measures do not work.       Pre anymore. Notify your surgeon if you notice any of the following signs  • Separation of incision line. • Increased redness, swelling, or warmth of skin around incision.   • Increased or foul smelling drainage from incision  • Red streaks on skin near ([place], [facility] [place] and [place] have this service; if you live in another [facility], you may check with them as well).  You need space to open car doors to position yourself properly with walker to get in and out of your car safely; some parking spaces ar total) by mouth 2 (two) times a day. DAVIE Cuadra         Esomeprazole Magnesium 20 MG Pack  Commonly known as: NEXIUM      Take 20 mg by mouth daily. glimepiride 4 MG Tabs  Commonly known as: AMARYL  Next dose due:  Tomorrow morning Friday 1 UNKNOWN **     Order ID Medication Name Action Time Action Reason Comments    [id number] HYDROcodone-acetaminophen [facility])  MG per tab 2 tablet (Or Linked Group #2) 09/29/21 1852 Given      [id number] HYDROcodone-acetaminophen ([facility])  MG per tab (37 °C) Filed at 09/30/2021 0829   SpO2 92 % Filed at 09/30/2021 0829      Patient's Most Recent Weight       Most Recent Value   Patient Weight 97 kg (213 lb 13.5 oz)         Lab Results Last 24 Hours      Hemoglobin & Hematocrit [[id number]] (Abnormal)  R Anxiety    • Chest pain, unspecified 11/01/2003    admit chest  pain NI TST   • 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 She states that her pain is well controlled.   She does have pain in other genna facility-administered medications on file prior to encounter. PARoxetine HCl 40 MG Oral Tab, Take 40 mg by mouth every morning., Disp: , Rfl:   metFORMIN HCl 1000 MG Oral Tab, Take 1 tablet (1,000 mg total) by mouth 2 (two) times daily with meals. , Disp: cyanosis. Integument: No rashes or lesions. Psychiatric: Appropriate mood and affect. Diagnostic Data:      Labs:  No results for input(s): WBC, HGB, MCV, PLT, BAND, INR in the last 168 hours.     Invalid input(s): LYM#, MONO#, BASOS#, EOSIN#    No INPATIENT    Room Number: 381/381-A     Session: 1     Number of Visits to Meet Established Goals: 1    Presenting Problem: s/p L QUOC on 9/29/2021    ASSESSMENT   Pt able to complete all functional mobility at SBA level or above.  Pt denied increase in pain Unable to rate  Location: L lateral hip mm  Management Techniques: Activity promotion; Body mechanics;  Relaxation; Repositioning    BALANCE Session: Up in chair; Needs met; Call light within reach; RN aware of session/findings; All patient questions and concerns addressed; SCDs in place;  Ice applied       Therapist PPE: surgical mask, goggles and gloves during session    Patient PPE: wearing a OTHER SURGICAL HISTORY      cystoscopy (diagnostic)   • TUBAL LIGATION     • UPPER GI ENDOSCOPY,EXAM         HOME SITUATION  Type of Home: Apartment   Home Layout: One level  Stairs to Enter :  (2-3 flights - no elevator )  Railing: Yes  Stairs to Bedroom: -   Sitting down on and standing up from a chair with arms (e.g., wheelchair, bedside commode, etc.): A Little   -   Moving from lying on back to sitting on the side of the bed?: A Little   How much help from another person does the patient currently nee Ice applied (RN aware no chair alarm, pt educated to call )    ASSESSMENT   Patient is a 60year old female admitted on 9/29/2021 for L QUOC. Pertinent comorbidities and personal factors impacting therapy include those listed above.   In this PT evaluation, the room. Occupational Therapy Notes (last 72 hours)  Notes from 9/27/2021 11:44 AM through 9/30/2021 11:44 AM   No notes of this type exist for this encounter. Video Swallow Study Notes    No notes of this type exist for this encounter.

## (undated) NOTE — LETTER
09/28/19        Geronimo Peñaloza  [address] [place] [address]      Dear Adriana Helms,    [facility] records indicate that you have outstanding lab work and or testing that was ordered for you and has not yet been completed: Mammogram - - Please contact Jahaira

## (undated) NOTE — LETTER
10/15/20        Maliha Maldonado  [address] [place] [address]      Dear Alison Haskins,    [address] records indicate that you have outstanding lab work and or testing that was ordered for you and has not yet been completed: Fasting Lab Work   To keep

## (undated) NOTE — LETTER
OUTSIDE TESTING RESULT REQUEST     IMPORTANT: FOR YOUR IMMEDIATE ATTENTION    Please FAX all test results listed below to: [phone]       * * * * If testing is NOT complete, arrange with patient A.S.AThanhP. * * * *      Patient Name: Sunni Vern

## (undated) NOTE — LETTER
05/13/21        Sid Pour  [address] [place] [address]      Dear Analisa Tomas,    [facility] records indicate that you have outstanding lab work and or testing that was ordered for you and has not yet been completed:  Orders Placed This Encount

## (undated) NOTE — MR AVS SNAPSHOT
[facility]  [address], [address]  [address] [facility] [address] [phone]               Thank you for choosing us for your health care visit with Speedy Farias MD.  We are glad to serve you and happy to provide you with [facility] ALPRAZolam 0.5 MG Tabs   Take 1 tablet (0.5 mg total) by mouth 3 (three) times daily as needed. Commonly known as:  XANAX           atorvastatin 20 MG Tabs   Take 1 tablet (20 mg total) by mouth nightly.    Commonly known as:  LIPITOR           Etodolac Take 1 tablet (4 mg total) by mouth every 8 (eight) hours as needed for Nausea. Commonly known as:  ZOFRAN           PARoxetine HCl 40 MG Tabs   Take 1 tablet (40 mg total) by mouth every morning. Commonly known as:  PAXIL           * Notice:   This lis